# Patient Record
Sex: FEMALE | Race: WHITE | Employment: OTHER | URBAN - METROPOLITAN AREA
[De-identification: names, ages, dates, MRNs, and addresses within clinical notes are randomized per-mention and may not be internally consistent; named-entity substitution may affect disease eponyms.]

---

## 2021-04-28 ENCOUNTER — HOSPITAL ENCOUNTER (OUTPATIENT)
Dept: GENERAL RADIOLOGY | Age: 76
Discharge: HOME | End: 2021-04-28
Attending: SURGERY
Payer: MEDICARE

## 2021-04-28 ENCOUNTER — HOSPITAL ENCOUNTER (OUTPATIENT)
Dept: SURGERY | Age: 76
Discharge: HOME | End: 2021-04-28
Payer: MEDICARE

## 2021-04-28 VITALS
SYSTOLIC BLOOD PRESSURE: 155 MMHG | TEMPERATURE: 97.3 F | BODY MASS INDEX: 27.14 KG/M2 | HEART RATE: 63 BPM | HEIGHT: 62 IN | DIASTOLIC BLOOD PRESSURE: 82 MMHG | OXYGEN SATURATION: 97 % | RESPIRATION RATE: 16 BRPM | WEIGHT: 147.5 LBS

## 2021-04-28 LAB
ALBUMIN SERPL-MCNC: 3.7 G/DL (ref 3.2–4.6)
ALBUMIN/GLOB SERPL: 0.9 {RATIO} (ref 1.2–3.5)
ALP SERPL-CCNC: 88 U/L (ref 50–136)
ALT SERPL-CCNC: 33 U/L (ref 12–65)
ANION GAP SERPL CALC-SCNC: 5 MMOL/L (ref 7–16)
APPEARANCE UR: ABNORMAL
AST SERPL-CCNC: 34 U/L (ref 15–37)
ATRIAL RATE: 59 BPM
BACTERIA URNS QL MICRO: 0 /HPF
BASOPHILS # BLD: 0.1 K/UL (ref 0–0.2)
BASOPHILS NFR BLD: 1 % (ref 0–2)
BILIRUB SERPL-MCNC: 0.9 MG/DL (ref 0.2–1.1)
BILIRUB UR QL: NEGATIVE
BUN SERPL-MCNC: 18 MG/DL (ref 8–23)
CALCIUM SERPL-MCNC: 9.5 MG/DL (ref 8.3–10.4)
CALCULATED P AXIS, ECG09: -3 DEGREES
CALCULATED R AXIS, ECG10: 47 DEGREES
CALCULATED T AXIS, ECG11: 69 DEGREES
CASTS URNS QL MICRO: 0 /LPF
CHLORIDE SERPL-SCNC: 105 MMOL/L (ref 98–107)
CO2 SERPL-SCNC: 30 MMOL/L (ref 21–32)
COLOR UR: YELLOW
CREAT SERPL-MCNC: 1.1 MG/DL (ref 0.6–1)
DIAGNOSIS, 93000: NORMAL
DIFFERENTIAL METHOD BLD: NORMAL
EOSINOPHIL # BLD: 0.2 K/UL (ref 0–0.8)
EOSINOPHIL NFR BLD: 2 % (ref 0.5–7.8)
EPI CELLS #/AREA URNS HPF: ABNORMAL /HPF
ERYTHROCYTE [DISTWIDTH] IN BLOOD BY AUTOMATED COUNT: 12.9 % (ref 11.9–14.6)
GLOBULIN SER CALC-MCNC: 4.1 G/DL (ref 2.3–3.5)
GLUCOSE SERPL-MCNC: 177 MG/DL (ref 65–100)
GLUCOSE UR STRIP.AUTO-MCNC: NEGATIVE MG/DL
HCT VFR BLD AUTO: 43.8 % (ref 35.8–46.3)
HGB BLD-MCNC: 14.1 G/DL (ref 11.7–15.4)
HGB UR QL STRIP: NEGATIVE
IMM GRANULOCYTES # BLD AUTO: 0 K/UL (ref 0–0.5)
IMM GRANULOCYTES NFR BLD AUTO: 0 % (ref 0–5)
INR PPP: 1
KETONES UR QL STRIP.AUTO: NEGATIVE MG/DL
LEUKOCYTE ESTERASE UR QL STRIP.AUTO: NEGATIVE
LYMPHOCYTES # BLD: 2.9 K/UL (ref 0.5–4.6)
LYMPHOCYTES NFR BLD: 29 % (ref 13–44)
MCH RBC QN AUTO: 29.3 PG (ref 26.1–32.9)
MCHC RBC AUTO-ENTMCNC: 32.2 G/DL (ref 31.4–35)
MCV RBC AUTO: 90.9 FL (ref 79.6–97.8)
MONOCYTES # BLD: 0.9 K/UL (ref 0.1–1.3)
MONOCYTES NFR BLD: 10 % (ref 4–12)
NEUTS SEG # BLD: 5.6 K/UL (ref 1.7–8.2)
NEUTS SEG NFR BLD: 58 % (ref 43–78)
NITRITE UR QL STRIP.AUTO: NEGATIVE
NRBC # BLD: 0 K/UL (ref 0–0.2)
P-R INTERVAL, ECG05: 178 MS
PH UR STRIP: 6 [PH] (ref 5–9)
PLATELET # BLD AUTO: 230 K/UL (ref 150–450)
PMV BLD AUTO: 10.5 FL (ref 9.4–12.3)
POTASSIUM SERPL-SCNC: 4.6 MMOL/L (ref 3.5–5.1)
PROT SERPL-MCNC: 7.8 G/DL (ref 6.3–8.2)
PROT UR STRIP-MCNC: NEGATIVE MG/DL
PROTHROMBIN TIME: 13.9 SEC (ref 12.5–14.7)
Q-T INTERVAL, ECG07: 456 MS
QRS DURATION, ECG06: 94 MS
QTC CALCULATION (BEZET), ECG08: 451 MS
RBC # BLD AUTO: 4.82 M/UL (ref 4.05–5.2)
RBC #/AREA URNS HPF: ABNORMAL /HPF
SODIUM SERPL-SCNC: 140 MMOL/L (ref 136–145)
SP GR UR REFRACTOMETRY: 1.03 (ref 1–1.02)
UROBILINOGEN UR QL STRIP.AUTO: 1 EU/DL (ref 0.2–1)
VENTRICULAR RATE, ECG03: 59 BPM
WBC # BLD AUTO: 9.7 K/UL (ref 4.3–11.1)
WBC URNS QL MICRO: ABNORMAL /HPF

## 2021-04-28 PROCEDURE — 81003 URINALYSIS AUTO W/O SCOPE: CPT

## 2021-04-28 PROCEDURE — 85610 PROTHROMBIN TIME: CPT

## 2021-04-28 PROCEDURE — 85025 COMPLETE CBC W/AUTO DIFF WBC: CPT

## 2021-04-28 PROCEDURE — 93005 ELECTROCARDIOGRAM TRACING: CPT | Performed by: SURGERY

## 2021-04-28 PROCEDURE — 71046 X-RAY EXAM CHEST 2 VIEWS: CPT

## 2021-04-28 PROCEDURE — 80053 COMPREHEN METABOLIC PANEL: CPT

## 2021-04-28 RX ORDER — SERTRALINE HYDROCHLORIDE 50 MG/1
50 TABLET, FILM COATED ORAL DAILY
COMMUNITY

## 2021-04-28 NOTE — PERIOP NOTES
Patient verified name and     Order for consent found in EHR and matches case posting; patient verified. Type 3 surgery, walk-in assessment complete. Labs per surgeon: CBC, CMP, UA, PT/INR, T/S DOS, CXR; results pending  Labs per anesthesia protocol: No additional;   EKG: EKG-2021    Reviewed pt history and medications with Dr. Kiana Sofia. Per Dr. Kiana Sofia, pt is to remain taking ASA 81 mg. Patient COVID test date 2021; Patient did show for the appointment. The testing center is located at the [address], [place]. If appointment is needed patient provided telephone number of [phone]. Hospital approved surgical skin cleanser and instructions given per hospital policy. Patient provided with and instructed on educational handouts including Guide to Surgery, Pain Management, Hand Hygiene, Blood Transfusion Education, and [place] [facility] Brochure. Patient answered medical/surgical history questions at their best of ability. All prior to admission medications documented in [facility]. Original medication prescription bottle were not visualized during patient appointment. Patient instructed to hold all vitamins 7 days prior to surgery and NSAIDS 5 days prior to surgery, patient verbalized understanding. Patient teach back successful and patient demonstrates knowledge of instructions. PLEASE CONTINUE TAKING ALL PRESCRIPTION MEDICATIONS UP TO THE DAY OF SURGERY UNLESS OTHERWISE DIRECTED BELOW. DISCONTINUE all vitamins and supplements 7 days prior to surgery. DISCONTINUE Non-Steriodal Anti-Inflammatory (NSAIDS) such as Advil, Excedrin, Ibuprofen, Motrin and Aleve 5 days prior to surgery.      Home Medications to take  the day of surgery   Flecainide  Amlodipine    Pantoprazole   Aspirin 81 mg   Sertraline   Atenolol      Home Medications   to Hold           Comments            *Visitor policy of 1 visitor per patient discussed. Please do not bring home medications with you on the day of surgery unless otherwise directed by your nurse. If you are instructed to bring home medications, please give them to your nurse as they will be administered by the nursing staff. If you have any questions, please call [facility]ortensia Mckeon [phone] or [facility] [phone]. A copy of this note was provided to the patient for reference.

## 2021-04-28 NOTE — PERIOP NOTES
Recent Results (from the past 12 hour(s))   URINALYSIS W/ RFLX MICROSCOPIC    Collection Time: 04/28/21  2:10 PM   Result Value Ref Range    Color YELLOW      Appearance TURBID      Specific gravity 1.029 (H) 1.001 - 1.023      pH (UA) 6.0 5.0 - 9.0      Protein Negative NEG mg/dL    Glucose Negative mg/dL    Ketone Negative NEG mg/dL    Bilirubin Negative NEG      Blood Negative NEG      Urobilinogen 1.0 0.2 - 1.0 EU/dL    Nitrites Negative NEG      Leukocyte Esterase Negative NEG      WBC 0-3 0 /hpf    RBC 3-5 0 /hpf    Epithelial cells 0-3 0 /hpf    Bacteria 0 0 /hpf    Casts 0 0 /lpf   CBC WITH AUTOMATED DIFF    Collection Time: 04/28/21  2:41 PM   Result Value Ref Range    WBC 9.7 4.3 - 11.1 K/uL    RBC 4.82 4.05 - 5.2 M/uL    HGB 14.1 11.7 - 15.4 g/dL    HCT 43.8 35.8 - 46.3 %    MCV 90.9 79.6 - 97.8 FL    MCH 29.3 26.1 - 32.9 PG    MCHC 32.2 31.4 - 35.0 g/dL    RDW 12.9 11.9 - 14.6 %    PLATELET 849 346 - 712 K/uL    MPV 10.5 9.4 - 12.3 FL    ABSOLUTE NRBC 0.00 0.0 - 0.2 K/uL    DF AUTOMATED      NEUTROPHILS 58 43 - 78 %    LYMPHOCYTES 29 13 - 44 %    MONOCYTES 10 4.0 - 12.0 %    EOSINOPHILS 2 0.5 - 7.8 %    BASOPHILS 1 0.0 - 2.0 %    IMMATURE GRANULOCYTES 0 0.0 - 5.0 %    ABS. NEUTROPHILS 5.6 1.7 - 8.2 K/UL    ABS. LYMPHOCYTES 2.9 0.5 - 4.6 K/UL    ABS. MONOCYTES 0.9 0.1 - 1.3 K/UL    ABS. EOSINOPHILS 0.2 0.0 - 0.8 K/UL    ABS. BASOPHILS 0.1 0.0 - 0.2 K/UL    ABS. IMM.  GRANS. 0.0 0.0 - 0.5 K/UL   METABOLIC PANEL, COMPREHENSIVE    Collection Time: 04/28/21  2:41 PM   Result Value Ref Range    Sodium 140 136 - 145 mmol/L    Potassium 4.6 3.5 - 5.1 mmol/L    Chloride 105 98 - 107 mmol/L    CO2 30 21 - 32 mmol/L    Anion gap 5 (L) 7 - 16 mmol/L    Glucose 177 (H) 65 - 100 mg/dL    BUN 18 8 - 23 MG/DL    Creatinine 1.10 (H) 0.6 - 1.0 MG/DL    GFR est AA >60 >60 ml/min/1.73m2    GFR est non-AA 52 (L) >60 ml/min/1.73m2    Calcium 9.5 8.3 - 10.4 MG/DL    Bilirubin, total 0.9 0.2 - 1.1 MG/DL    ALT (SGPT) 33 12 - 65 U/L    AST (SGOT) 34 15 - 37 U/L    Alk. phosphatase 88 50 - 136 U/L    Protein, total 7.8 6.3 - 8.2 g/dL    Albumin 3.7 3.2 - 4.6 g/dL    Globulin 4.1 (H) 2.3 - 3.5 g/dL    A-G Ratio 0.9 (L) 1.2 - 3.5     PROTHROMBIN TIME + INR    Collection Time: 04/28/21  2:41 PM   Result Value Ref Range    Prothrombin time 13.9 12.5 - 14.7 sec    INR 1.0     EKG, 12 LEAD, INITIAL    Collection Time: 04/28/21  2:53 PM   Result Value Ref Range    Ventricular Rate 59 BPM    Atrial Rate 59 BPM    P-R Interval 178 ms    QRS Duration 94 ms    Q-T Interval 456 ms    QTC Calculation (Bezet) 451 ms    Calculated P Axis -3 degrees    Calculated R Axis 47 degrees    Calculated T Axis 69 degrees    Diagnosis       Sinus bradycardia  Otherwise normal ECG  No previous ECGs available       EXAM: XR CHEST PA LAT     INDICATION: Preop clearance     COMPARISON: None.     FINDINGS: PA and lateral radiographs of the chest demonstrate clear lungs.  The  cardiac and mediastinal contours and pulmonary vascularity are normal. The bones  and soft tissues are within normal limits.      IMPRESSION  Normal chest.

## 2021-05-04 ENCOUNTER — ANESTHESIA EVENT (OUTPATIENT)
Dept: SURGERY | Age: 76
DRG: 407 | End: 2021-05-04
Payer: MEDICARE

## 2021-05-05 ENCOUNTER — ANESTHESIA (OUTPATIENT)
Dept: SURGERY | Age: 76
DRG: 407 | End: 2021-05-05
Payer: MEDICARE

## 2021-05-05 ENCOUNTER — HOSPITAL ENCOUNTER (INPATIENT)
Age: 76
LOS: 5 days | Discharge: HOME | DRG: 407 | End: 2021-05-10
Attending: SURGERY | Admitting: SURGERY
Payer: MEDICARE

## 2021-05-05 DIAGNOSIS — C25.1 MALIGNANT NEOPLASM OF BODY OF PANCREAS (HCC): Primary | ICD-10-CM

## 2021-05-05 DIAGNOSIS — D49.0 IPMN (INTRADUCTAL PAPILLARY MUCINOUS NEOPLASM): ICD-10-CM

## 2021-05-05 LAB
ABO + RH BLD: NORMAL
BLOOD GROUP ANTIBODIES SERPL: NORMAL
GLUCOSE BLD STRIP.AUTO-MCNC: 170 MG/DL (ref 65–100)
GLUCOSE BLD STRIP.AUTO-MCNC: 255 MG/DL (ref 65–100)
SERVICE CMNT-IMP: ABNORMAL
SERVICE CMNT-IMP: ABNORMAL
SPECIMEN EXP DATE BLD: NORMAL

## 2021-05-05 PROCEDURE — 77030038079 HC RELD STPLR ENDOSC J&J -G: Performed by: SURGERY

## 2021-05-05 PROCEDURE — 49905 OMENTAL FLAP INTRA-ABDOM: CPT | Performed by: SURGERY

## 2021-05-05 PROCEDURE — 2709999900 HC NON-CHARGEABLE SUPPLY: Performed by: SURGERY

## 2021-05-05 PROCEDURE — 65270000029 HC RM PRIVATE

## 2021-05-05 PROCEDURE — 0FT40ZZ RESECTION OF GALLBLADDER, OPEN APPROACH: ICD-10-PCS | Performed by: SURGERY

## 2021-05-05 PROCEDURE — 77030016151 HC PROTCTR LNS DFOG COVD -B: Performed by: SURGERY

## 2021-05-05 PROCEDURE — 77030011264 HC ELECTRD BLD EXT COVD -A: Performed by: SURGERY

## 2021-05-05 PROCEDURE — 07TP0ZZ RESECTION OF SPLEEN, OPEN APPROACH: ICD-10-PCS | Performed by: SURGERY

## 2021-05-05 PROCEDURE — 76210000006 HC OR PH I REC 0.5 TO 1 HR: Performed by: SURGERY

## 2021-05-05 PROCEDURE — 76010000176 HC OR TIME 4.5 TO 5 HR INTENSV-TIER 1: Performed by: SURGERY

## 2021-05-05 PROCEDURE — 0FBG0ZZ EXCISION OF PANCREAS, OPEN APPROACH: ICD-10-PCS | Performed by: SURGERY

## 2021-05-05 PROCEDURE — 77030020829: Performed by: SURGERY

## 2021-05-05 PROCEDURE — 77030008606 HC TRCR ENDOSC KII AMR -B: Performed by: SURGERY

## 2021-05-05 PROCEDURE — 2709999900 HC NON-CHARGEABLE SUPPLY

## 2021-05-05 PROCEDURE — 77030002996 HC SUT SLK J&J -A: Performed by: SURGERY

## 2021-05-05 PROCEDURE — 77030009527 HC GEL PRT SYS AMR -E: Performed by: SURGERY

## 2021-05-05 PROCEDURE — 77030034849: Performed by: SURGERY

## 2021-05-05 PROCEDURE — 77030008462 HC STPLR SKN PROX J&J -A: Performed by: SURGERY

## 2021-05-05 PROCEDURE — 74011250636 HC RX REV CODE- 250/636: Performed by: SURGERY

## 2021-05-05 PROCEDURE — 86901 BLOOD TYPING SEROLOGIC RH(D): CPT

## 2021-05-05 PROCEDURE — 76060000040 HC ANESTHESIA 4.5 TO 5 HR: Performed by: SURGERY

## 2021-05-05 PROCEDURE — 77030027876 HC STPLR ENDOSC FLX PWR J&J -G1: Performed by: SURGERY

## 2021-05-05 PROCEDURE — 74011250636 HC RX REV CODE- 250/636: Performed by: STUDENT IN AN ORGANIZED HEALTH CARE EDUCATION/TRAINING PROGRAM

## 2021-05-05 PROCEDURE — 77030002966 HC SUT PDS J&J -A: Performed by: SURGERY

## 2021-05-05 PROCEDURE — 77030041524 HC SEALNT FIBRN VITASEAL J&J -C: Performed by: SURGERY

## 2021-05-05 PROCEDURE — 77030008756 HC TU IRR SUC STRY -B: Performed by: SURGERY

## 2021-05-05 PROCEDURE — 74011250636 HC RX REV CODE- 250/636: Performed by: NURSE ANESTHETIST, CERTIFIED REGISTERED

## 2021-05-05 PROCEDURE — 48140 PARTIAL REMOVAL OF PANCREAS: CPT | Performed by: NURSE PRACTITIONER

## 2021-05-05 PROCEDURE — 77030039425 HC BLD LARYNG TRULITE DISP TELE -A: Performed by: NURSE ANESTHETIST, CERTIFIED REGISTERED

## 2021-05-05 PROCEDURE — 74011250636 HC RX REV CODE- 250/636: Performed by: NURSE PRACTITIONER

## 2021-05-05 PROCEDURE — 76998 US GUIDE INTRAOP: CPT | Performed by: SURGERY

## 2021-05-05 PROCEDURE — 77030038552 HC DRN WND MDII -A: Performed by: SURGERY

## 2021-05-05 PROCEDURE — 49905 OMENTAL FLAP INTRA-ABDOM: CPT | Performed by: NURSE PRACTITIONER

## 2021-05-05 PROCEDURE — 77030009968 HC RELD STPLR ENDOSC J&J -D: Performed by: SURGERY

## 2021-05-05 PROCEDURE — 77030012770 HC TRCR OPT FX AMR -B: Performed by: SURGERY

## 2021-05-05 PROCEDURE — 74011636637 HC RX REV CODE- 636/637: Performed by: NURSE PRACTITIONER

## 2021-05-05 PROCEDURE — 77030002986 HC SUT PROL J&J -A: Performed by: SURGERY

## 2021-05-05 PROCEDURE — 48140 PARTIAL REMOVAL OF PANCREAS: CPT | Performed by: SURGERY

## 2021-05-05 PROCEDURE — 88304 TISSUE EXAM BY PATHOLOGIST: CPT

## 2021-05-05 PROCEDURE — 82962 GLUCOSE BLOOD TEST: CPT

## 2021-05-05 PROCEDURE — 77030031139 HC SUT VCRL2 J&J -A: Performed by: SURGERY

## 2021-05-05 PROCEDURE — 74011000250 HC RX REV CODE- 250: Performed by: NURSE ANESTHETIST, CERTIFIED REGISTERED

## 2021-05-05 PROCEDURE — 88305 TISSUE EXAM BY PATHOLOGIST: CPT

## 2021-05-05 PROCEDURE — 74011000250 HC RX REV CODE- 250: Performed by: NURSE PRACTITIONER

## 2021-05-05 PROCEDURE — 77030037088 HC TUBE ENDOTRACH ORAL NSL COVD-A: Performed by: NURSE ANESTHETIST, CERTIFIED REGISTERED

## 2021-05-05 PROCEDURE — 77030034628 HC LIGASURE LAP SEAL DIV MD COVD -F: Performed by: SURGERY

## 2021-05-05 PROCEDURE — 77030003602 HC NDL NRV BLK BBMI -B: Performed by: STUDENT IN AN ORGANIZED HEALTH CARE EDUCATION/TRAINING PROGRAM

## 2021-05-05 PROCEDURE — 77030040922 HC BLNKT HYPOTHRM STRY -A: Performed by: NURSE ANESTHETIST, CERTIFIED REGISTERED

## 2021-05-05 PROCEDURE — 74011000258 HC RX REV CODE- 258: Performed by: NURSE PRACTITIONER

## 2021-05-05 PROCEDURE — 77030019908 HC STETH ESOPH SIMS -A: Performed by: NURSE ANESTHETIST, CERTIFIED REGISTERED

## 2021-05-05 PROCEDURE — 88309 TISSUE EXAM BY PATHOLOGIST: CPT

## 2021-05-05 PROCEDURE — 47600 CHOLECYSTECTOMY: CPT | Performed by: NURSE PRACTITIONER

## 2021-05-05 PROCEDURE — 77030040361 HC SLV COMPR DVT MDII -B: Performed by: SURGERY

## 2021-05-05 PROCEDURE — 0WJP4ZZ INSPECTION OF GASTROINTESTINAL TRACT, PERCUTANEOUS ENDOSCOPIC APPROACH: ICD-10-PCS | Performed by: SURGERY

## 2021-05-05 PROCEDURE — 47600 CHOLECYSTECTOMY: CPT | Performed by: SURGERY

## 2021-05-05 RX ORDER — SODIUM CHLORIDE, SODIUM LACTATE, POTASSIUM CHLORIDE, CALCIUM CHLORIDE 600; 310; 30; 20 MG/100ML; MG/100ML; MG/100ML; MG/100ML
25 INJECTION, SOLUTION INTRAVENOUS CONTINUOUS
Status: DISCONTINUED | OUTPATIENT
Start: 2021-05-05 | End: 2021-05-09

## 2021-05-05 RX ORDER — METOPROLOL TARTRATE 5 MG/5ML
2.5 INJECTION INTRAVENOUS EVERY 6 HOURS
Status: DISCONTINUED | OUTPATIENT
Start: 2021-05-05 | End: 2021-05-06

## 2021-05-05 RX ORDER — CEFAZOLIN SODIUM/WATER 2 G/20 ML
2 SYRINGE (ML) INTRAVENOUS ONCE
Status: COMPLETED | OUTPATIENT
Start: 2021-05-05 | End: 2021-05-05

## 2021-05-05 RX ORDER — DIPHENHYDRAMINE HYDROCHLORIDE 50 MG/ML
12.5 INJECTION, SOLUTION INTRAMUSCULAR; INTRAVENOUS
Status: DISCONTINUED | OUTPATIENT
Start: 2021-05-05 | End: 2021-05-05 | Stop reason: HOSPADM

## 2021-05-05 RX ORDER — LIDOCAINE HYDROCHLORIDE 20 MG/ML
INJECTION, SOLUTION EPIDURAL; INFILTRATION; INTRACAUDAL; PERINEURAL AS NEEDED
Status: DISCONTINUED | OUTPATIENT
Start: 2021-05-05 | End: 2021-05-05 | Stop reason: HOSPADM

## 2021-05-05 RX ORDER — VECURONIUM BROMIDE FOR INJECTION 1 MG/ML
INJECTION, POWDER, LYOPHILIZED, FOR SOLUTION INTRAVENOUS AS NEEDED
Status: DISCONTINUED | OUTPATIENT
Start: 2021-05-05 | End: 2021-05-05 | Stop reason: HOSPADM

## 2021-05-05 RX ORDER — FLUMAZENIL 0.1 MG/ML
0.2 INJECTION INTRAVENOUS
Status: DISCONTINUED | OUTPATIENT
Start: 2021-05-05 | End: 2021-05-05 | Stop reason: HOSPADM

## 2021-05-05 RX ORDER — LIDOCAINE HYDROCHLORIDE 10 MG/ML
0.1 INJECTION INFILTRATION; PERINEURAL AS NEEDED
Status: DISCONTINUED | OUTPATIENT
Start: 2021-05-05 | End: 2021-05-05 | Stop reason: HOSPADM

## 2021-05-05 RX ORDER — HYDROMORPHONE HYDROCHLORIDE 2 MG/ML
INJECTION, SOLUTION INTRAMUSCULAR; INTRAVENOUS; SUBCUTANEOUS AS NEEDED
Status: DISCONTINUED | OUTPATIENT
Start: 2021-05-05 | End: 2021-05-05 | Stop reason: HOSPADM

## 2021-05-05 RX ORDER — ENOXAPARIN SODIUM 100 MG/ML
40 INJECTION SUBCUTANEOUS DAILY
Status: DISCONTINUED | OUTPATIENT
Start: 2021-05-06 | End: 2021-05-10 | Stop reason: HOSPADM

## 2021-05-05 RX ORDER — SODIUM CHLORIDE, SODIUM LACTATE, POTASSIUM CHLORIDE, CALCIUM CHLORIDE 600; 310; 30; 20 MG/100ML; MG/100ML; MG/100ML; MG/100ML
100 INJECTION, SOLUTION INTRAVENOUS CONTINUOUS
Status: DISCONTINUED | OUTPATIENT
Start: 2021-05-05 | End: 2021-05-05 | Stop reason: HOSPADM

## 2021-05-05 RX ORDER — ONDANSETRON 2 MG/ML
INJECTION INTRAMUSCULAR; INTRAVENOUS AS NEEDED
Status: DISCONTINUED | OUTPATIENT
Start: 2021-05-05 | End: 2021-05-05 | Stop reason: HOSPADM

## 2021-05-05 RX ORDER — SODIUM CHLORIDE 0.9 % (FLUSH) 0.9 %
5-40 SYRINGE (ML) INJECTION EVERY 8 HOURS
Status: DISCONTINUED | OUTPATIENT
Start: 2021-05-05 | End: 2021-05-05 | Stop reason: HOSPADM

## 2021-05-05 RX ORDER — PROPOFOL 10 MG/ML
INJECTION, EMULSION INTRAVENOUS AS NEEDED
Status: DISCONTINUED | OUTPATIENT
Start: 2021-05-05 | End: 2021-05-05 | Stop reason: HOSPADM

## 2021-05-05 RX ORDER — ROPIVACAINE HYDROCHLORIDE 5 MG/ML
INJECTION, SOLUTION EPIDURAL; INFILTRATION; PERINEURAL
Status: DISCONTINUED | OUTPATIENT
Start: 2021-05-05 | End: 2021-05-05 | Stop reason: HOSPADM

## 2021-05-05 RX ORDER — KETAMINE HYDROCHLORIDE 50 MG/ML
INJECTION, SOLUTION INTRAMUSCULAR; INTRAVENOUS AS NEEDED
Status: DISCONTINUED | OUTPATIENT
Start: 2021-05-05 | End: 2021-05-05 | Stop reason: HOSPADM

## 2021-05-05 RX ORDER — SODIUM CHLORIDE 0.9 % (FLUSH) 0.9 %
5-40 SYRINGE (ML) INJECTION AS NEEDED
Status: DISCONTINUED | OUTPATIENT
Start: 2021-05-05 | End: 2021-05-05 | Stop reason: HOSPADM

## 2021-05-05 RX ORDER — NEOSTIGMINE METHYLSULFATE 1 MG/ML
INJECTION, SOLUTION INTRAVENOUS AS NEEDED
Status: DISCONTINUED | OUTPATIENT
Start: 2021-05-05 | End: 2021-05-05 | Stop reason: HOSPADM

## 2021-05-05 RX ORDER — METRONIDAZOLE 500 MG/100ML
500 INJECTION, SOLUTION INTRAVENOUS ONCE
Status: COMPLETED | OUTPATIENT
Start: 2021-05-05 | End: 2021-05-05

## 2021-05-05 RX ORDER — SODIUM CHLORIDE 0.9 % (FLUSH) 0.9 %
5-40 SYRINGE (ML) INJECTION AS NEEDED
Status: DISCONTINUED | OUTPATIENT
Start: 2021-05-05 | End: 2021-05-10 | Stop reason: HOSPADM

## 2021-05-05 RX ORDER — OXYCODONE HYDROCHLORIDE 5 MG/1
5 TABLET ORAL
Status: DISCONTINUED | OUTPATIENT
Start: 2021-05-05 | End: 2021-05-05 | Stop reason: HOSPADM

## 2021-05-05 RX ORDER — HYDROMORPHONE HYDROCHLORIDE 1 MG/ML
0.5 INJECTION, SOLUTION INTRAMUSCULAR; INTRAVENOUS; SUBCUTANEOUS
Status: DISCONTINUED | OUTPATIENT
Start: 2021-05-05 | End: 2021-05-05 | Stop reason: HOSPADM

## 2021-05-05 RX ORDER — METRONIDAZOLE 500 MG/100ML
500 INJECTION, SOLUTION INTRAVENOUS EVERY 12 HOURS
Status: COMPLETED | OUTPATIENT
Start: 2021-05-05 | End: 2021-05-06

## 2021-05-05 RX ORDER — EPHEDRINE SULFATE/0.9% NACL/PF 50 MG/5 ML
SYRINGE (ML) INTRAVENOUS AS NEEDED
Status: DISCONTINUED | OUTPATIENT
Start: 2021-05-05 | End: 2021-05-05 | Stop reason: HOSPADM

## 2021-05-05 RX ORDER — ROCURONIUM BROMIDE 10 MG/ML
INJECTION, SOLUTION INTRAVENOUS AS NEEDED
Status: DISCONTINUED | OUTPATIENT
Start: 2021-05-05 | End: 2021-05-05 | Stop reason: HOSPADM

## 2021-05-05 RX ORDER — DEXAMETHASONE SODIUM PHOSPHATE 4 MG/ML
INJECTION, SOLUTION INTRA-ARTICULAR; INTRALESIONAL; INTRAMUSCULAR; INTRAVENOUS; SOFT TISSUE AS NEEDED
Status: DISCONTINUED | OUTPATIENT
Start: 2021-05-05 | End: 2021-05-05 | Stop reason: HOSPADM

## 2021-05-05 RX ORDER — HYDROMORPHONE HYDROCHLORIDE 1 MG/ML
1 INJECTION, SOLUTION INTRAMUSCULAR; INTRAVENOUS; SUBCUTANEOUS
Status: DISCONTINUED | OUTPATIENT
Start: 2021-05-05 | End: 2021-05-06 | Stop reason: SDUPTHER

## 2021-05-05 RX ORDER — GLYCOPYRROLATE 0.2 MG/ML
INJECTION INTRAMUSCULAR; INTRAVENOUS AS NEEDED
Status: DISCONTINUED | OUTPATIENT
Start: 2021-05-05 | End: 2021-05-05 | Stop reason: HOSPADM

## 2021-05-05 RX ORDER — FENTANYL CITRATE 50 UG/ML
INJECTION, SOLUTION INTRAMUSCULAR; INTRAVENOUS AS NEEDED
Status: DISCONTINUED | OUTPATIENT
Start: 2021-05-05 | End: 2021-05-05 | Stop reason: HOSPADM

## 2021-05-05 RX ORDER — NALOXONE HYDROCHLORIDE 0.4 MG/ML
0.1 INJECTION, SOLUTION INTRAMUSCULAR; INTRAVENOUS; SUBCUTANEOUS AS NEEDED
Status: DISCONTINUED | OUTPATIENT
Start: 2021-05-05 | End: 2021-05-05 | Stop reason: HOSPADM

## 2021-05-05 RX ORDER — SODIUM CHLORIDE 0.9 % (FLUSH) 0.9 %
5-40 SYRINGE (ML) INJECTION EVERY 8 HOURS
Status: DISCONTINUED | OUTPATIENT
Start: 2021-05-05 | End: 2021-05-10 | Stop reason: HOSPADM

## 2021-05-05 RX ADMIN — METRONIDAZOLE 500 MG: 500 INJECTION, SOLUTION INTRAVENOUS at 20:44

## 2021-05-05 RX ADMIN — HYDROMORPHONE HYDROCHLORIDE 0.5 MG: 1 INJECTION, SOLUTION INTRAMUSCULAR; INTRAVENOUS; SUBCUTANEOUS at 14:27

## 2021-05-05 RX ADMIN — ROCURONIUM BROMIDE 40 MG: 10 INJECTION, SOLUTION INTRAVENOUS at 07:26

## 2021-05-05 RX ADMIN — SODIUM CHLORIDE, SODIUM LACTATE, POTASSIUM CHLORIDE, AND CALCIUM CHLORIDE 100 ML/HR: 600; 310; 30; 20 INJECTION, SOLUTION INTRAVENOUS at 18:21

## 2021-05-05 RX ADMIN — ROPIVACAINE HYDROCHLORIDE 15 ML: 150 INJECTION, SOLUTION EPIDURAL; INFILTRATION; PERINEURAL at 11:26

## 2021-05-05 RX ADMIN — METOPROLOL TARTRATE 2.5 MG: 5 INJECTION INTRAVENOUS at 23:59

## 2021-05-05 RX ADMIN — Medication 10 MG: at 08:29

## 2021-05-05 RX ADMIN — PHENYLEPHRINE HYDROCHLORIDE 120 MCG: 10 INJECTION INTRAVENOUS at 07:48

## 2021-05-05 RX ADMIN — VECURONIUM BROMIDE 1 MG: 1 INJECTION, POWDER, LYOPHILIZED, FOR SOLUTION INTRAVENOUS at 10:22

## 2021-05-05 RX ADMIN — Medication 10 ML: at 18:22

## 2021-05-05 RX ADMIN — VECURONIUM BROMIDE 1 MG: 1 INJECTION, POWDER, LYOPHILIZED, FOR SOLUTION INTRAVENOUS at 09:49

## 2021-05-05 RX ADMIN — HYDROMORPHONE HYDROCHLORIDE 0.2 MG: 2 INJECTION INTRAMUSCULAR; INTRAVENOUS; SUBCUTANEOUS at 09:25

## 2021-05-05 RX ADMIN — Medication 10 MG: at 07:39

## 2021-05-05 RX ADMIN — ONDANSETRON 4 MG: 2 INJECTION INTRAMUSCULAR; INTRAVENOUS at 07:31

## 2021-05-05 RX ADMIN — VECURONIUM BROMIDE 1 MG: 1 INJECTION, POWDER, LYOPHILIZED, FOR SOLUTION INTRAVENOUS at 09:23

## 2021-05-05 RX ADMIN — Medication 3 MG: at 11:14

## 2021-05-05 RX ADMIN — Medication 10 MG: at 08:10

## 2021-05-05 RX ADMIN — VECURONIUM BROMIDE 1 MG: 1 INJECTION, POWDER, LYOPHILIZED, FOR SOLUTION INTRAVENOUS at 08:55

## 2021-05-05 RX ADMIN — HYDROMORPHONE HYDROCHLORIDE 0.2 MG: 2 INJECTION INTRAMUSCULAR; INTRAVENOUS; SUBCUTANEOUS at 09:40

## 2021-05-05 RX ADMIN — METOPROLOL TARTRATE 2.5 MG: 5 INJECTION INTRAVENOUS at 18:21

## 2021-05-05 RX ADMIN — METRONIDAZOLE 500 MG: 500 INJECTION, SOLUTION INTRAVENOUS at 05:52

## 2021-05-05 RX ADMIN — CEFAZOLIN SODIUM 1 G: 1 INJECTION, POWDER, FOR SOLUTION INTRAMUSCULAR; INTRAVENOUS at 18:21

## 2021-05-05 RX ADMIN — Medication 10 MG: at 07:37

## 2021-05-05 RX ADMIN — SODIUM CHLORIDE, SODIUM LACTATE, POTASSIUM CHLORIDE, AND CALCIUM CHLORIDE: 600; 310; 30; 20 INJECTION, SOLUTION INTRAVENOUS at 09:32

## 2021-05-05 RX ADMIN — HYDROMORPHONE HYDROCHLORIDE 1 MG: 1 INJECTION, SOLUTION INTRAMUSCULAR; INTRAVENOUS; SUBCUTANEOUS at 18:57

## 2021-05-05 RX ADMIN — HYDROMORPHONE HYDROCHLORIDE 0.2 MG: 2 INJECTION INTRAMUSCULAR; INTRAVENOUS; SUBCUTANEOUS at 10:32

## 2021-05-05 RX ADMIN — DEXAMETHASONE SODIUM PHOSPHATE 4 MG: 4 INJECTION, SOLUTION INTRAMUSCULAR; INTRAVENOUS at 07:31

## 2021-05-05 RX ADMIN — KETAMINE HYDROCHLORIDE 30 MG: 50 INJECTION INTRAMUSCULAR; INTRAVENOUS at 07:45

## 2021-05-05 RX ADMIN — PROPOFOL 150 MG: 10 INJECTION, EMULSION INTRAVENOUS at 07:25

## 2021-05-05 RX ADMIN — INSULIN HUMAN 9 UNITS: 100 INJECTION, SOLUTION PARENTERAL at 18:54

## 2021-05-05 RX ADMIN — CEFAZOLIN 2 G: 1 INJECTION, POWDER, FOR SOLUTION INTRAVENOUS at 07:45

## 2021-05-05 RX ADMIN — SODIUM CHLORIDE, SODIUM LACTATE, POTASSIUM CHLORIDE, AND CALCIUM CHLORIDE 100 ML/HR: 600; 310; 30; 20 INJECTION, SOLUTION INTRAVENOUS at 05:51

## 2021-05-05 RX ADMIN — GLYCOPYRROLATE 0.4 MG: 0.2 INJECTION, SOLUTION INTRAMUSCULAR; INTRAVENOUS at 11:14

## 2021-05-05 RX ADMIN — VECURONIUM BROMIDE 1 MG: 1 INJECTION, POWDER, LYOPHILIZED, FOR SOLUTION INTRAVENOUS at 08:18

## 2021-05-05 RX ADMIN — ROPIVACAINE HYDROCHLORIDE 15 ML: 150 INJECTION, SOLUTION EPIDURAL; INFILTRATION; PERINEURAL at 11:23

## 2021-05-05 RX ADMIN — LIDOCAINE HYDROCHLORIDE 80 MG: 20 INJECTION, SOLUTION EPIDURAL; INFILTRATION; INTRACAUDAL; PERINEURAL at 07:25

## 2021-05-05 RX ADMIN — FENTANYL CITRATE 100 MCG: 50 INJECTION INTRAMUSCULAR; INTRAVENOUS at 07:25

## 2021-05-05 RX ADMIN — KETAMINE HYDROCHLORIDE 15 MG: 50 INJECTION INTRAMUSCULAR; INTRAVENOUS at 08:45

## 2021-05-05 NOTE — ANESTHESIA PROCEDURE NOTES
Peripheral Block    Start time: 5/5/2021 11:20 AM  End time: 5/5/2021 11:23 AM  Performed by: Hilaria Goldmann, MD  Authorized by: Hilaria Goldmann, MD       Pre-procedure:    Indications: at surgeon's request and post-op pain management    Preanesthetic Checklist: patient identified, risks and benefits discussed, site marked, timeout performed, anesthesia consent given and patient being monitored    Timeout Time: 11:20          Block Type:   Block Type:  Rectus sheath  Laterality:  Left  Monitoring:  Standard ASA monitoring, oxygen, continuous pulse ox, frequent vital sign checks and heart rate  Injection Technique:  Single shot  Procedures: ultrasound guided    Patient Position: supine  Prep: chlorhexidine    Location:  Abdominal  Needle Type:  Stimuplex  Needle Gauge:  20 G  Needle Localization:  Ultrasound guidance  Medication Injected:  Ropivacaine (PF) (NAROPIN)(0.5%) 5 mg/mL injection, 15 mL  Med Admin Time: 5/5/2021 11:23 AM    Assessment:  Number of attempts:  1  Injection Assessment:  Incremental injection every 5 mL, no paresthesia, ultrasound image on chart, local visualized surrounding nerve on ultrasound, negative aspiration for blood and no intravascular symptoms  Patient tolerance:  Patient tolerated the procedure well with no immediate complications  +5 cc NS

## 2021-05-05 NOTE — OP NOTES
[facility]  OPERATIVE REPORT    Name:  Wil Carter  MR#:  [id number]  :  1945  ACCOUNT #:  [de-identified]  DATE OF SERVICE:  2021    PREOPERATIVE DIAGNOSIS:  Pancreatic intraductal papillary mucinous neoplasm. POSTOPERATIVE DIAGNOSIS:  Pancreatic intraductal papillary mucinous neoplasm. PROCEDURES PERFORMED:  1. Subtotal distal pancreatectomy with splenectomy, laparoscopic converted to open. 2.  Extensive vascular dissection. 3.  Cholecystectomy. 4.  Falciform ligament flap. 5.  Intraoperative ultrasound. SURGEON:  Areatha Lundborg, MD    ASSISTANT:  Ewelina Brizuela ND    ANESTHESIA:  general    COMPLICATIONS: none    SPECIMENS REMOVED:  As above. IMPLANTS:  Shaq x 1    ESTIMATED BLOOD LOSS:  100 ml    INDICATION:  This is a 59-year-old female, who presented with pancreatic cystic lesions involving her pancreatic body and the tail and is being watched by GI, it is enlarging and is concerning for main duct IPMN, which has high risk for cancer. I offered her surgical resection to remove the distal part of her pancreas and spared the head of the pancreas. She understands the surgery is mainly to reduce her cancer risk in the future. She is already diabetic and then she may need pancreatic enzyme supplements. This was all discussed with her. She understood the risks and benefits and agreed to proceed. FINDINGS:  1. Her pancreas is indurated with multiple nodular changes and the surrounding adhesions involving from the neck to the tail of pancreas. 2.  The nodular and the inflammatory changes extend onto the portal vein/superior mesenteric vein confluence with adhesions to celiac trunk and the common hepatic artery. Extensive vascular dissection is performed, which cannot be safely done laparoscopically, so the case was switched to open. PROCEDURE:  After informed consent was obtained, the patient was brought into the operating room and left on supine position. General anesthesia was administered. The patient's abdomen was prepped and draped in the routine fashion and we first placed a minilaparotomy incision at the epigastrium. The GelPort was placed and then pneumoperitoneum created. I placed a 12-mm trocar at the left subcostal area and two 5-mm trocars, one at the left subcostal area laterally, the other one at the supraumbilical area and I first started mobilizing the gastrocolic ligaments. The lesser sac was entered and the pancreas was immediately felt to be indurated and extend all the way to the central pancreas, but I decided to continue mobilizing the stomach and the greater curvature was mobilized and the short gastric artery was taken. Then, the stomach fundus was completely mobilized off the spleen and then the splenic flexure of colon was mobilized and the colon was mobilized off the retroperitoneum. The tail of the pancreas was exposed and then I continued to mobilize the spleen laterally off the diaphragm and then I was able to flip the spleen and the tail of pancreas medially. Then, I continued laparoscopic mobilization. The falciform ligament was mobilized and preserved and then the back side of the stomach was mobilized off the anterior aspect of the pancreas. At this point, I performed intraoperative ultrasound and then also with manual palpation, I confirmed nodular changes and cystic changes extend all the way onto the portal vein and the superior mesenteric vein confluence. The resection margins can be really close and also the initial dissection revealed inflammatory changes onto the vein and artery and I felt at this point, the laparoscopy is not feasible to perform such extensive vascular dissection. At this point, I decided to switch the case to open.     I extended the minilaparotomy incision cephalad and caudad and cephalad to the xiphoid process and inferiorly right above the umbilicus and the Bookwalter retractor was placed and then we started to dissect the pancreas superiorly and I isolated the common hepatic arteries. A total of three lymph nodes were removed in front of these arteries and I followed proximally and distally. Proximally, I stopped right at the gastroduodenal artery. There were small branches into the pancreas. This was suture ligated and then I was able to see the anterior surface of the portal vein and then, I started to make a tunnel in front of the portal vein superiorly. Below the pancreas, I identified the mesocolic colic vein. This was isolated and then ligated and then I continued to follow this vein and I was able to identify the superior mesenteric vein below the pancreas and then I started to make a tunnel from below and then I were able to connect the tunnel, I made it superiorly and an umbilical tape was passed to help to open up the tunnel and then after careful dissections, I was able to pass Endo-JUSTINO stapler. This was reinforced with Seamguard. I was able to pull the pancreas towards the left side to get the stapler onto healthy pancreatic tissues. Then, the stapler was fired and then the gross margin appeared to be negative. Then, I lifted the pancreas laterally and I started dissection to dissect the pancreas off the common hepatic arteries. I was able to get down to the bifurcation between the splenic artery and the common hepatic artery. The splenic artery takeoff was isolated circumferentially. There were quite a bit adhesions around the artery, so I used a vascular stapler to divide the splenic arteries and then further dissection revealed the splenic vein. As noted, her inferior mesenteric vein and the splenic vein was drawn together.   The inferior mesenteric vein was dissected and protected and then I divided the splenic vein at its takeoff with endovascular stapler and then the pancreas was continued to lift off the retroperitoneum both medially and laterally and the specimen was then removed from the surgical field. We moved our attention to the gallbladder. The gallbladder was taken off the liver bed with cautery device and dissection was carried all the way down to the cystic artery and the cystic duct. Both was individually isolated and then tied with 0 silk and divided and the liver surface was examined for hemostasis. Lastly, we mobilized the falciform ligament. This was brought from the lesser curvature of the stomach onto the pancreatic transection site and I did spray Tisseel at the pancreatic stump and then the falciform ligament flap was brought in to cover the stump. Lastly, a #19 Ericka Carrel drain was placed under the left diaphragm towards the pancreatic stump and the surgical field was inspected. No evidence of bleeding at the end of the case and then the midline fascia was closed with #1 looped PDS in running fashion and skin was closed with staples. I did close the 12-mm trocar site on the anterior fascia with 0 Vicryl stitch. As noted, Orlin Sapp was the first assistant in this case. She offered excellent exposure, helped the stapling and suturing and it would be very dangerous to perform this case without her assistance.         Silviano Gutierrez MD      [id number]  D:  05/05/2021 11:47  T:  05/05/2021 14:11  JOB #:  [id number]

## 2021-05-05 NOTE — PROGRESS NOTES
Problem: Falls - Risk of  Goal: *Absence of Falls  Description: Document Nitin Chacon Fall Risk and appropriate interventions in the flowsheet.   Outcome: Progressing Towards Goal  Note: Fall Risk Interventions:  Mobility Interventions: OT consult for ADLs, Patient to call before getting OOB, PT Consult for mobility concerns         Medication Interventions: Assess postural VS orthostatic hypotension, Patient to call before getting OOB, Teach patient to arise slowly

## 2021-05-05 NOTE — ANESTHESIA PREPROCEDURE EVALUATION
Relevant Problems   No relevant active problems       Anesthetic History          Comments: Per outside documentation last two GI procedures laryngospasm x1 requiring intubation     Review of Systems / Medical History  Patient summary reviewed, nursing notes reviewed and pertinent labs reviewed    Pulmonary  Within defined limits                 Neuro/Psych         Psychiatric history     Cardiovascular    Hypertension: well controlled        Dysrhythmias (atrial tachycardia)       Exercise tolerance: >4 METS  Comments: Denies any recent angina, botello, syncopal episodes. No palpitations or recent changes in functional status. GI/Hepatic/Renal     GERD: well controlled           Endo/Other    Diabetes: well controlled, type 2         Other Findings            Physical Exam    Airway  Mallampati: I  TM Distance: 4 - 6 cm  Neck ROM: normal range of motion   Mouth opening: Normal     Cardiovascular  Regular rate and rhythm,  S1 and S2 normal,  no murmur, click, rub, or gallop             Dental  No notable dental hx       Pulmonary  Breath sounds clear to auscultation               Abdominal         Other Findings            Anesthetic Plan    ASA: 2  Anesthesia type: general          Induction: Intravenous  Anesthetic plan and risks discussed with: Patient and Spouse      2nd PIV. Possible a-line depending surgical course. Discussed potential PNB pending course.

## 2021-05-05 NOTE — BRIEF OP NOTE
Brief Postoperative Note    Patient: Cisco Oconnor  YOB: 1945  MRN: [id number]    Date of Procedure: 5/5/2021     Pre-Op Diagnosis: IPMN (intraductal papillary mucinous neoplasm) [D49.0]    Post-Op Diagnosis: Same as preoperative diagnosis. Procedure(s):  DISTAL SUBTOTAL PANCREATECTOMY AND SPLENECTOMY LAPAROSCOPIC CONVERTED TO OPEN  Extensive vascular dissection  CHOLECYSTECTOMY,  Falciform ligament flap  Intraoperative US    Surgeon(s): Rai Morrissey MD    Surgical Assistant: Nurse Practitioner: Odessa Navarrete NP    Anesthesia: General     Estimated Blood Loss (mL): less than 214     Complications: None    Specimens:   ID Type Source Tests Collected by Time Destination   1 : Common hepatic lymph node x3 Preservative Abdomen  Rai Morrissey MD 5/5/2021 5953 Pathology   2 : Distal pancreatectomy and splenectomy  Fresh Abdomen  Rai Morrissey MD 5/5/2021 1033 Pathology   3 : Gallbladder Preservative Abdomen  Rai Morrissey MD 5/5/2021 1044 Pathology        Implants: * No implants in log *    Drains:   [facility] Pac #1 05/05/21 Left Abdomen (Active)       Findings:  1. Indurated pancreas with multiple nodular changes and surrounding adhesions involving from the neck to tail of pancreas;  2. The nodular and inflammatory changes extend onto portal vein/SMV confluence with adhesions to celiac trunk and common hepatic artery, extensive vascular dissection is performed, which can't be safely done laparoscopically.      Electronically Signed by Alex Hartley MD on 5/5/2021 at 11:24 AM

## 2021-05-05 NOTE — ANESTHESIA PROCEDURE NOTES
Peripheral Block    Start time: 5/5/2021 11:23 AM  End time: 5/5/2021 11:26 AM  Performed by: Mayte Staley MD  Authorized by: Mayte Staley MD       Pre-procedure:    Indications: at surgeon's request and post-op pain management    Preanesthetic Checklist: patient identified, risks and benefits discussed, site marked, timeout performed, anesthesia consent given and patient being monitored    Timeout Time: 11:23          Block Type:   Block Type:  Rectus sheath  Laterality:  Right  Monitoring:  Standard ASA monitoring, oxygen, continuous pulse ox, frequent vital sign checks and heart rate  Injection Technique:  Single shot  Procedures: ultrasound guided    Patient Position: supine  Prep: chlorhexidine    Location:  Abdominal  Needle Type:  Stimuplex  Needle Gauge:  20 G  Needle Localization:  Ultrasound guidance  Medication Injected:  Ropivacaine (PF) (NAROPIN)(0.5%) 5 mg/mL injection, 15 mL  Med Admin Time: 5/5/2021 11:26 AM    Assessment:  Number of attempts:  1  Injection Assessment:  Incremental injection every 5 mL, no paresthesia, ultrasound image on chart, local visualized surrounding nerve on ultrasound, negative aspiration for blood and no intravascular symptoms  Patient tolerance:  Patient tolerated the procedure well with no immediate complications  +5 cc NS

## 2021-05-05 NOTE — ROUTINE PROCESS
TRANSFER - IN REPORT:    Verbal report received from [facility] on Abby Dias  being received from  PACU for routine post - op      Report consisted of patients Situation, Background, Assessment and   Recommendations(SBAR). Information from the following report(s) OR Summary, Procedure Summary, Intake/Output, MAR, Accordion, Recent Results, Med Rec Status, Cardiac Rhythm sinus rhythm and Quality Measures was reviewed with the receiving nurse. Opportunity for questions and clarification was provided. Assessment completed upon patients arrival to unit and care assumed.

## 2021-05-05 NOTE — H&P
[facility]  [address] [place] [address]  [phone]      Patient:  Lizette Garcia  : 1945     HPI  Lizette Garcia is a 77 y.o. female  who was referred to the office by Dr. Justin Medeiros presents with complaints of Pancreatic cyst, appears to be main duct IPMN. she has underwent EUS in  and then MRI 3/21. Appears to be getting slightly larger and communicating with main duct. Mass involves pancreas  body per EUS. She has had a cyst since 2018 and had yearly workup. No history of pancreatitis. She complains of belching, mid sternal pain and back pain, but states her GI switched medication and she feels these symptoms are somewhat better. She also complains of right subcostal pain with eating. She does complain of constipation going every 3-4 days. She was diagnosed with DM last year. No current chest pain or shortness of breath. Denies history of smoking and drinks occassionally.      Family history of cancer- Father lung cancer  Medical history includes Diabetes Mellitus , HTN and A-fib,  Surgery includes- no abdominal surgeries. does take blood thinners- ASA 81mg and take flecainide for her A-fib             Past Medical History:   Diagnosis Date    Arrhythmia      Hypercholesterolemia      Hypertension               Current Outpatient Medications   Medication Sig Dispense Refill    aspirin delayed-release 81 mg tablet Take  by mouth daily.        atenoloL (TENORMIN) 25 mg tablet Take 25 mg by mouth daily.        flecainide (TAMBOCOR) 150 mg tablet Take 150 mg by mouth two (2) times a day.        pravastatin (PRAVACHOL) 10 mg tablet Take  by mouth nightly.        sertraline (ZOLOFT) 25 mg tablet Take  by mouth daily.        amLODIPine (NORVASC) 5 mg tablet Take 5 mg by mouth daily.        triamcinolone (ARISTOCORT) 0.5 % topical cream Apply  to affected area two (2) times a day.  use thin layer        pantoprazole (Protonix) 40 mg tablet Take 40 mg by mouth daily.          No Known Allergies        Past Surgical History:   Procedure Laterality Date    HX GYN         uterine fibroid    HX HEENT         tonsilectomy    AL BREAST SURGERY PROCEDURE UNLISTED Right       breast      History reviewed. No pertinent family history. Social History           Tobacco Use    Smoking status: Never Smoker    Smokeless tobacco: Never Used   Substance Use Topics    Alcohol use: Not on file         Review of Systems   A comprehensive review of systems was negative except for that written in the HPI. Physical Exam  Visit Vitals  BP (!) 140/78   Pulse 75   Ht 5' 2\" (1.575 m)   Wt 148 lb (67.1 kg)   SpO2 95%   BMI 27.07 kg/m²         General:          Alert, oriented, cooperative, awake patient in no acute distress   Skin:                Warm, moist with good texture   Eyes:               Sclera are clear, extraocular muscles intact  HENT:             Normocephalic; oral mucosa moist, nares patent; neck is supple; trachea midline  Respiratory:     Lungs clear to auscultation bilaterally, breathing is non-labored   Chest:              Symmetric throughout one respiratory excursion; no supraclavicular lymphadenopathy  CV:                  Regular rate and rhythm, no appreciable murmurs, rubs, gallops  Abdomen:        Soft,  non-distended; bowel sounds are normoactive   Extremities:     No cyanosis, clubbing or edema  Neurological:   No focal signs           MRI outside facility 3/2021             EUS      Cytology      Assessment/Plan:   Lawrence Castillo is a 77 y.o. female who has signs and symptoms consistent with mixed side branch/main duct IPMN. of pancreas located in body and tail. Discussed IPMN are pre cancerous, especially main duct type has higher risk and would recommend surgical removal to minimize cancer risk.       Her symptoms are also consistent with chronic cholecystitis and she states she had a HIDA scan that showed low EF.   We discussed cholecystectomy and distal pancreatectomy at the same time. She is scheduled for vacation in April and would like to go on vacation and schedule afterwards. She will discuss with her  and will call office if she wishes to schedule.     Call office to schedule cholecystectomy and distal pancreatectomy/splenectomy, which may be done laparoscopically. Need to review her outside film to decide the level of pancreatic transection.  As noted, the residual pancreas is also at risk for cancer even it's not harboring IPMN at present time, and will need monitoring.      Alex Hartley MD

## 2021-05-05 NOTE — ANESTHESIA POSTPROCEDURE EVALUATION
Procedure(s):  DISTAL PANCREATECTOMY AND SPLENECTOMY LAPAROSCOPIC, CONVERTED TO OPEN; CHOLECYSTECTOMY; EXTENSIVE VASCULAR DISSECTION;  FALCIFORM LIGAMENT FLAP; INTRAOPERATIVE US.    general    Anesthesia Post Evaluation      Multimodal analgesia: multimodal analgesia used between 6 hours prior to anesthesia start to PACU discharge  Patient location during evaluation: bedside  Patient participation: complete - patient participated  Level of consciousness: responsive to verbal stimuli  Pain management: adequate  Airway patency: patent  Anesthetic complications: no  Cardiovascular status: hemodynamically stable  Respiratory status: spontaneous ventilation  Hydration status: stable    Final Post Anesthesia Temperature Assessment:  Normothermia (36.0-37.5 degrees C)      INITIAL Post-op Vital signs:   Vitals Value Taken Time   /57 05/05/21 1615   Temp 36.8 °C (98.2 °F) 05/05/21 1430   Pulse 71 05/05/21 1621   Resp 18 05/05/21 1559   SpO2 87 % 05/05/21 1621   Vitals shown include unvalidated device data.

## 2021-05-05 NOTE — ROUTINE PROCESS
END OF SHIFT NOTE:    INTAKE/OUTPUT  No intake/output data recorded. Voiding: NO  Catheter: YES  Drain:   Nasogastric Tube 05/05/21 (Active)   Site Assessment Clean, dry, & intact 05/05/21 1756   Securement Device Tape 05/05/21 1756   G Port Status Intermittent Suction 05/05/21 1756   External Insertion Mikey (cms) 45 cms 05/05/21 1756   Action Taken Placement verified (comment) 05/05/21 1756   Drainage Description Green 05/05/21 1756       Shaq Drain #1 05/05/21 Left Abdomen (Active)   Site Assessment Clean, dry, & intact 05/05/21 1756   Dressing Status Clean, dry, & intact 05/05/21 1756   Drainage Description Serosanguinous 05/05/21 1756   Shaq Drain Airleak No 05/05/21 1756   Status Patent; Charged;Draining 05/05/21 1756   Y Connector Used No 05/05/21 1756   Output (ml) 20 ml 05/05/21 1756               Flatus: Patient does not have flatus present. Stool:  0 occurrences. Characteristics:       Emesis: 0 occurrences.     Characteristics:        VITAL SIGNS  Patient Vitals for the past 12 hrs:   Temp Pulse Resp BP SpO2   05/05/21 1821 -- 71 -- 133/62 --   05/05/21 1756 98 °F (36.7 °C) 71 18 133/62 93 %   05/05/21 1659 -- 70 -- (!) 105/51 97 %   05/05/21 1644 -- 70 -- (!) 102/47 96 %   05/05/21 1630 -- 67 -- (!) 124/56 97 %   05/05/21 1615 -- 72 -- (!) 111/57 96 %   05/05/21 1559 -- 71 18 (!) 120/58 96 %   05/05/21 1544 -- 69 18 (!) 121/58 96 %   05/05/21 1530 -- 69 20 (!) 116/58 (!) 88 %   05/05/21 1514 -- 69 18 (!) 110/54 92 %   05/05/21 1459 -- 67 18 (!) 116/58 94 %   05/05/21 1444 -- 68 16 (!) 114/56 96 %   05/05/21 1430 98.2 °F (36.8 °C) 66 18 (!) 117/57 96 %   05/05/21 1414 -- 66 16 (!) 116/58 96 %   05/05/21 1359 -- 64 18 (!) 112/57 95 %   05/05/21 1344 -- 64 18 (!) 114/59 96 %   05/05/21 1330 -- 64 20 (!) 108/56 94 %   05/05/21 1314 -- 64 18 (!) 107/54 95 %   05/05/21 1259 -- 62 16 (!) 101/54 93 %   05/05/21 1254 -- 63 18 (!) 105/53 95 %   05/05/21 1249 98 °F (36.7 °C) 62 16 (!) 101/51 94 %   05/05/21 1244 -- 61 18 (!) 105/50 95 %   05/05/21 1240 -- 62 14 (!) 102/55 94 %   05/05/21 1235 -- 63 16 (!) 111/54 93 %   05/05/21 1230 -- 62 12 (!) 113/56 98 %   05/05/21 1225 -- 63 16 (!) 121/56 97 %   05/05/21 1219 -- 63 12 (!) 111/56 98 %   05/05/21 1217 98.2 °F (36.8 °C) 63 12 (!) 111/54 98 %   05/05/21 1216 -- 62 12 (!) 111/54 97 %       Pain Assessment  Pain Intensity 1: 4 (05/05/21 1857)  Pain Location 1: Abdomen  Pain Intervention(s) 1: Medication (see MAR)  Patient Stated Pain Goal: 0    Ambulating  No    Shift report given to oncoming nurse at the bedside.     Mal Moser RN

## 2021-05-05 NOTE — PERIOP NOTES
Family updated at 8:38 AM by Gal Marti RN. Four digit code obtained from . Family updated at 10:29 AM by Gal Marti RN. Four digit code obtained from .

## 2021-05-06 LAB
ANION GAP SERPL CALC-SCNC: 5 MMOL/L (ref 7–16)
BASOPHILS # BLD: 0 K/UL (ref 0–0.2)
BASOPHILS NFR BLD: 0 % (ref 0–2)
BUN SERPL-MCNC: 20 MG/DL (ref 8–23)
CALCIUM SERPL-MCNC: 8.8 MG/DL (ref 8.3–10.4)
CHLORIDE SERPL-SCNC: 105 MMOL/L (ref 98–107)
CO2 SERPL-SCNC: 31 MMOL/L (ref 21–32)
CREAT SERPL-MCNC: 0.98 MG/DL (ref 0.6–1)
DIFFERENTIAL METHOD BLD: ABNORMAL
EOSINOPHIL # BLD: 0 K/UL (ref 0–0.8)
EOSINOPHIL NFR BLD: 0 % (ref 0.5–7.8)
ERYTHROCYTE [DISTWIDTH] IN BLOOD BY AUTOMATED COUNT: 13.2 % (ref 11.9–14.6)
GLUCOSE BLD STRIP.AUTO-MCNC: 156 MG/DL (ref 65–100)
GLUCOSE BLD STRIP.AUTO-MCNC: 168 MG/DL (ref 65–100)
GLUCOSE BLD STRIP.AUTO-MCNC: 199 MG/DL (ref 65–100)
GLUCOSE BLD STRIP.AUTO-MCNC: 238 MG/DL (ref 65–100)
GLUCOSE SERPL-MCNC: 165 MG/DL (ref 65–100)
HCT VFR BLD AUTO: 40.3 % (ref 35.8–46.3)
HGB BLD-MCNC: 12.6 G/DL (ref 11.7–15.4)
IMM GRANULOCYTES # BLD AUTO: 0.1 K/UL (ref 0–0.5)
IMM GRANULOCYTES NFR BLD AUTO: 1 % (ref 0–5)
LYMPHOCYTES # BLD: 1.2 K/UL (ref 0.5–4.6)
LYMPHOCYTES NFR BLD: 5 % (ref 13–44)
MCH RBC QN AUTO: 29.6 PG (ref 26.1–32.9)
MCHC RBC AUTO-ENTMCNC: 31.3 G/DL (ref 31.4–35)
MCV RBC AUTO: 94.6 FL (ref 79.6–97.8)
MONOCYTES # BLD: 2.7 K/UL (ref 0.1–1.3)
MONOCYTES NFR BLD: 12 % (ref 4–12)
NEUTS SEG # BLD: 19 K/UL (ref 1.7–8.2)
NEUTS SEG NFR BLD: 82 % (ref 43–78)
NRBC # BLD: 0 K/UL (ref 0–0.2)
PLATELET # BLD AUTO: 202 K/UL (ref 150–450)
PMV BLD AUTO: 11.1 FL (ref 9.4–12.3)
POTASSIUM SERPL-SCNC: 4.4 MMOL/L (ref 3.5–5.1)
RBC # BLD AUTO: 4.26 M/UL (ref 4.05–5.2)
SERVICE CMNT-IMP: ABNORMAL
SODIUM SERPL-SCNC: 141 MMOL/L (ref 136–145)
WBC # BLD AUTO: 23.1 K/UL (ref 4.3–11.1)

## 2021-05-06 PROCEDURE — 85025 COMPLETE CBC W/AUTO DIFF WBC: CPT

## 2021-05-06 PROCEDURE — C9113 INJ PANTOPRAZOLE SODIUM, VIA: HCPCS | Performed by: NURSE PRACTITIONER

## 2021-05-06 PROCEDURE — 74011636637 HC RX REV CODE- 636/637: Performed by: NURSE PRACTITIONER

## 2021-05-06 PROCEDURE — 82962 GLUCOSE BLOOD TEST: CPT

## 2021-05-06 PROCEDURE — 74011000258 HC RX REV CODE- 258: Performed by: NURSE PRACTITIONER

## 2021-05-06 PROCEDURE — 36415 COLL VENOUS BLD VENIPUNCTURE: CPT

## 2021-05-06 PROCEDURE — 97530 THERAPEUTIC ACTIVITIES: CPT

## 2021-05-06 PROCEDURE — 80048 BASIC METABOLIC PNL TOTAL CA: CPT

## 2021-05-06 PROCEDURE — 65270000029 HC RM PRIVATE

## 2021-05-06 PROCEDURE — 74011250636 HC RX REV CODE- 250/636: Performed by: NURSE PRACTITIONER

## 2021-05-06 PROCEDURE — 97162 PT EVAL MOD COMPLEX 30 MIN: CPT

## 2021-05-06 PROCEDURE — 74011000250 HC RX REV CODE- 250: Performed by: NURSE PRACTITIONER

## 2021-05-06 PROCEDURE — 74011250637 HC RX REV CODE- 250/637: Performed by: NURSE PRACTITIONER

## 2021-05-06 PROCEDURE — 2709999900 HC NON-CHARGEABLE SUPPLY

## 2021-05-06 RX ORDER — SERTRALINE HYDROCHLORIDE 50 MG/1
50 TABLET, FILM COATED ORAL DAILY
Status: DISCONTINUED | OUTPATIENT
Start: 2021-05-06 | End: 2021-05-10 | Stop reason: HOSPADM

## 2021-05-06 RX ORDER — ACETAMINOPHEN 500 MG
1000 TABLET ORAL EVERY 6 HOURS
Status: DISCONTINUED | OUTPATIENT
Start: 2021-05-06 | End: 2021-05-07

## 2021-05-06 RX ORDER — GABAPENTIN 300 MG/1
300 CAPSULE ORAL 3 TIMES DAILY
Status: DISCONTINUED | OUTPATIENT
Start: 2021-05-06 | End: 2021-05-10 | Stop reason: HOSPADM

## 2021-05-06 RX ORDER — ATENOLOL 25 MG/1
25 TABLET ORAL DAILY
Status: DISCONTINUED | OUTPATIENT
Start: 2021-05-06 | End: 2021-05-10 | Stop reason: HOSPADM

## 2021-05-06 RX ORDER — ASPIRIN 81 MG/1
81 TABLET ORAL DAILY
Status: DISCONTINUED | OUTPATIENT
Start: 2021-05-06 | End: 2021-05-10 | Stop reason: HOSPADM

## 2021-05-06 RX ORDER — FLECAINIDE ACETATE 100 MG/1
150 TABLET ORAL DAILY
Status: DISCONTINUED | OUTPATIENT
Start: 2021-05-06 | End: 2021-05-10 | Stop reason: HOSPADM

## 2021-05-06 RX ORDER — PRAVASTATIN SODIUM 20 MG/1
10 TABLET ORAL
Status: DISCONTINUED | OUTPATIENT
Start: 2021-05-06 | End: 2021-05-10 | Stop reason: HOSPADM

## 2021-05-06 RX ORDER — HYDROMORPHONE HYDROCHLORIDE 1 MG/ML
0.5 INJECTION, SOLUTION INTRAMUSCULAR; INTRAVENOUS; SUBCUTANEOUS
Status: DISCONTINUED | OUTPATIENT
Start: 2021-05-06 | End: 2021-05-10 | Stop reason: HOSPADM

## 2021-05-06 RX ORDER — AMLODIPINE BESYLATE 5 MG/1
5 TABLET ORAL DAILY
Status: DISCONTINUED | OUTPATIENT
Start: 2021-05-06 | End: 2021-05-10 | Stop reason: HOSPADM

## 2021-05-06 RX ADMIN — Medication 10 ML: at 15:12

## 2021-05-06 RX ADMIN — HYDROMORPHONE HYDROCHLORIDE 1 MG: 1 INJECTION, SOLUTION INTRAMUSCULAR; INTRAVENOUS; SUBCUTANEOUS at 08:55

## 2021-05-06 RX ADMIN — GABAPENTIN 300 MG: 300 CAPSULE ORAL at 21:31

## 2021-05-06 RX ADMIN — ASPIRIN 81 MG: 81 TABLET ORAL at 11:55

## 2021-05-06 RX ADMIN — INSULIN HUMAN 6 UNITS: 100 INJECTION, SOLUTION PARENTERAL at 18:30

## 2021-05-06 RX ADMIN — METRONIDAZOLE 500 MG: 500 INJECTION, SOLUTION INTRAVENOUS at 06:00

## 2021-05-06 RX ADMIN — METOPROLOL TARTRATE 2.5 MG: 5 INJECTION INTRAVENOUS at 05:23

## 2021-05-06 RX ADMIN — INSULIN HUMAN 3 UNITS: 100 INJECTION, SOLUTION PARENTERAL at 00:12

## 2021-05-06 RX ADMIN — ACETAMINOPHEN 1000 MG: 500 TABLET, FILM COATED ORAL at 18:30

## 2021-05-06 RX ADMIN — ACETAMINOPHEN 1000 MG: 500 TABLET, FILM COATED ORAL at 11:55

## 2021-05-06 RX ADMIN — SERTRALINE HYDROCHLORIDE 50 MG: 50 TABLET ORAL at 11:55

## 2021-05-06 RX ADMIN — CEFAZOLIN SODIUM 1 G: 1 INJECTION, POWDER, FOR SOLUTION INTRAMUSCULAR; INTRAVENOUS at 01:26

## 2021-05-06 RX ADMIN — SODIUM CHLORIDE, SODIUM LACTATE, POTASSIUM CHLORIDE, AND CALCIUM CHLORIDE 100 ML/HR: 600; 310; 30; 20 INJECTION, SOLUTION INTRAVENOUS at 05:30

## 2021-05-06 RX ADMIN — PANTOPRAZOLE SODIUM 40 MG: 40 INJECTION, POWDER, FOR SOLUTION INTRAVENOUS at 08:55

## 2021-05-06 RX ADMIN — FLECAINIDE ACETATE 150 MG: 100 TABLET ORAL at 12:03

## 2021-05-06 RX ADMIN — GABAPENTIN 300 MG: 300 CAPSULE ORAL at 11:55

## 2021-05-06 RX ADMIN — Medication 10 ML: at 21:31

## 2021-05-06 RX ADMIN — PRAVASTATIN SODIUM 10 MG: 20 TABLET ORAL at 21:31

## 2021-05-06 RX ADMIN — Medication 10 ML: at 05:23

## 2021-05-06 RX ADMIN — ACETAMINOPHEN 1000 MG: 500 TABLET, FILM COATED ORAL at 23:46

## 2021-05-06 RX ADMIN — INSULIN HUMAN 3 UNITS: 100 INJECTION, SOLUTION PARENTERAL at 05:22

## 2021-05-06 RX ADMIN — AMLODIPINE BESYLATE 5 MG: 5 TABLET ORAL at 11:55

## 2021-05-06 RX ADMIN — Medication 10 ML: at 00:18

## 2021-05-06 RX ADMIN — SODIUM CHLORIDE, SODIUM LACTATE, POTASSIUM CHLORIDE, AND CALCIUM CHLORIDE 100 ML/HR: 600; 310; 30; 20 INJECTION, SOLUTION INTRAVENOUS at 16:56

## 2021-05-06 RX ADMIN — ENOXAPARIN SODIUM 40 MG: 40 INJECTION SUBCUTANEOUS at 08:55

## 2021-05-06 RX ADMIN — HYDROMORPHONE HYDROCHLORIDE 1 MG: 1 INJECTION, SOLUTION INTRAMUSCULAR; INTRAVENOUS; SUBCUTANEOUS at 01:36

## 2021-05-06 RX ADMIN — INSULIN HUMAN 3 UNITS: 100 INJECTION, SOLUTION PARENTERAL at 12:04

## 2021-05-06 RX ADMIN — ATENOLOL 25 MG: 25 TABLET ORAL at 12:03

## 2021-05-06 RX ADMIN — GABAPENTIN 300 MG: 300 CAPSULE ORAL at 16:56

## 2021-05-06 NOTE — PROGRESS NOTES
CM met with patient to complete assessment. Spouse present at bedside. Patient presented groggy, but able to provide clear information. Demographic information verified by the patient. The patient lives with her spouse Mirna Varela [phone] in a one story home with no steps at the entrance. Patient confirmed prior to this hospital visit, the patient is independent with completing all ADL's and drives. DME:NONE    Patient obtains her prescription medications from [facility] on [address]. Patient voiced no difficulty with obtaining medications in the community. Discharge planning: PT has been consulted. Patient denies any history of [facility] services or STR. Dispo: TBD at this time. CM will monitor plan of care and therapy recommendations, to assist further with discharge planning. Please consult or notify CM if any needs shall arise. CM continues to monitor plan of care. Care Management Interventions  PCP Verified by CM: Yes  Mode of Transport at Discharge: Other (see comment)(Spouse Mirna Varela [phone].)  Transition of Care Consult (CM Consult): Discharge Planning  Discharge Durable Medical Equipment: No  Physical Therapy Consult: Yes  Occupational Therapy Consult: No  Speech Therapy Consult: No  Current Support Network: Lives with Spouse, Own Home(Patient lives with her spouse Mirna Varela [phone]. .)  Confirm Follow Up Transport: Self  Name of the Patient Representative Who was Provided with a Choice of Provider and Agrees with the Discharge Plan: Patient.     Resource Information Provided?: No  Discharge Location  Discharge Placement: Home

## 2021-05-06 NOTE — PROGRESS NOTES
5/6/2021    PLAN:  Continue present care  DIET CLEAR LIQUID  IVFs  SCD/IS/Protonix for prophylactic measures  SCD's or Sequential Compression Device and Lovenox  OOB and ambulate  Pain/nausea control  Monitor labs  Will need post splenectomy vaccines  DC NGT  DC woody  PTA medication added            ASSESSMENT:  Admit Date: 5/5/2021    Procedure(s):  DISTAL PANCREATECTOMY AND SPLENECTOMY LAPAROSCOPIC, CONVERTED TO OPEN; CHOLECYSTECTOMY; EXTENSIVE VASCULAR DISSECTION;  FALCIFORM LIGAMENT FLAP; INTRAOPERATIVE US      SUBJECTIVE:  05/06/21 1 Day Post-Op awake in bed, no complaints. Pain controlled. -flatus or BM. Denies nausea or vomiting.     OBJECTIVE:  Patient Vitals for the past 24 hrs:   Temp Pulse Resp BP SpO2   05/06/21 0655 98 °F (36.7 °C) 66 16 (!) 152/55 96 %   05/06/21 0500 97.8 °F (36.6 °C) 67 16 133/60 96 %   05/05/21 2356 97.7 °F (36.5 °C) 73 18 (!) 148/72 96 %   05/05/21 1949 97.4 °F (36.3 °C) 69 18 (!) 122/58 96 %   05/05/21 1821 -- 71 -- 133/62 --   05/05/21 1756 98 °F (36.7 °C) 71 18 133/62 93 %   05/05/21 1659 -- 70 -- (!) 105/51 97 %   05/05/21 1644 -- 70 -- (!) 102/47 96 %   05/05/21 1630 -- 67 -- (!) 124/56 97 %   05/05/21 1615 -- 72 -- (!) 111/57 96 %   05/05/21 1559 -- 71 18 (!) 120/58 96 %   05/05/21 1544 -- 69 18 (!) 121/58 96 %   05/05/21 1530 -- 69 20 (!) 116/58 (!) 88 %   05/05/21 1514 -- 69 18 (!) 110/54 92 %   05/05/21 1459 -- 67 18 (!) 116/58 94 %   05/05/21 1444 -- 68 16 (!) 114/56 96 %   05/05/21 1430 98.2 °F (36.8 °C) 66 18 (!) 117/57 96 %   05/05/21 1414 -- 66 16 (!) 116/58 96 %   05/05/21 1359 -- 64 18 (!) 112/57 95 %   05/05/21 1344 -- 64 18 (!) 114/59 96 %   05/05/21 1330 -- 64 20 (!) 108/56 94 %   05/05/21 1314 -- 64 18 (!) 107/54 95 %   05/05/21 1259 -- 62 16 (!) 101/54 93 %   05/05/21 1254 -- 63 18 (!) 105/53 95 %   05/05/21 1249 98 °F (36.7 °C) 62 16 (!) 101/51 94 %   05/05/21 1244 -- 61 18 (!) 105/50 95 % 05/05/21 1240 -- 62 14 (!) 102/55 94 %   05/05/21 1235 -- 63 16 (!) 111/54 93 %   05/05/21 1230 -- 62 12 (!) 113/56 98 %   05/05/21 1225 -- 63 16 (!) 121/56 97 %   05/05/21 1219 -- 63 12 (!) 111/56 98 %   05/05/21 1217 98.2 °F (36.8 °C) 63 12 (!) 111/54 98 %   05/05/21 1216 -- 62 12 (!) 111/54 97 %         Date 05/05/21 0700 - 05/06/21 0659 05/06/21 0700 - 05/07/21 0659   Shift 0966-6587 9484-7148 24 Hour Total 7732-6471 8038-6134 24 Hour Total   INTAKE   I.V.(mL/kg/hr) 1550(1.9)  1550(1)        Volume (lactated Ringers infusion) 1550  1550      Shift Total(mL/kg) 1550(23.3)  1550(23.3)      OUTPUT   Urine(mL/kg/hr) 120(0.2) 600(0.8) 720(0.5)        Urine Output 120  120        Urine Output (mL) (Urinary Catheter 05/05/21)  600 600      Emesis/NG output  50 50        Output (ml) (Nasogastric Tube 05/05/21)  50 50      Drains 20 10 30        Output (ml) (Shaq Drain #1 05/05/21 Left Abdomen) 20 10 30      Blood 10  10        Estimated Blood Loss 10  10      Shift Total(mL/kg) 150(2.3) 660(9.9) 810(12.2)      NET 1400 -660 740      Weight (kg) 66.5 66.5 66.5 66.5 66.5 66.5            General:          No acute distress    Lungs:             Even and unlabored   Heart:              RRR  Abdomen:        Soft, Non distended, appropriately tender. Dressing C/D/I.  Ericka Carrel with serosanguineous drainage    Extremities:     No cyanosis, clubbing or edema  Neurologic:      No focal deficits           Labs:    Recent Labs     05/06/21  0533   WBC 23.1*   HGB 12.6         K 4.4      CO2 31   BUN 20   CREA 0.98   *         Crystal Toribio Aparicio, NP

## 2021-05-06 NOTE — PROGRESS NOTES
Hourly rounds performed this shift. Patient denies needs at this time. Bed in low position and locked. The call light and personal items are in reach. Will continue to monitor and report to oncoming nurse.

## 2021-05-07 LAB
ANION GAP SERPL CALC-SCNC: 4 MMOL/L (ref 7–16)
BASOPHILS # BLD: 0 K/UL (ref 0–0.2)
BASOPHILS NFR BLD: 0 % (ref 0–2)
BUN SERPL-MCNC: 15 MG/DL (ref 8–23)
CALCIUM SERPL-MCNC: 8.4 MG/DL (ref 8.3–10.4)
CHLORIDE SERPL-SCNC: 106 MMOL/L (ref 98–107)
CO2 SERPL-SCNC: 32 MMOL/L (ref 21–32)
CREAT SERPL-MCNC: 0.69 MG/DL (ref 0.6–1)
DIFFERENTIAL METHOD BLD: ABNORMAL
EOSINOPHIL # BLD: 0 K/UL (ref 0–0.8)
EOSINOPHIL NFR BLD: 0 % (ref 0.5–7.8)
ERYTHROCYTE [DISTWIDTH] IN BLOOD BY AUTOMATED COUNT: 13.3 % (ref 11.9–14.6)
GLUCOSE BLD STRIP.AUTO-MCNC: 138 MG/DL (ref 65–100)
GLUCOSE BLD STRIP.AUTO-MCNC: 149 MG/DL (ref 65–100)
GLUCOSE BLD STRIP.AUTO-MCNC: 155 MG/DL (ref 65–100)
GLUCOSE BLD STRIP.AUTO-MCNC: 178 MG/DL (ref 65–100)
GLUCOSE BLD STRIP.AUTO-MCNC: 187 MG/DL (ref 65–100)
GLUCOSE BLD STRIP.AUTO-MCNC: 226 MG/DL (ref 65–100)
GLUCOSE SERPL-MCNC: 160 MG/DL (ref 65–100)
HCT VFR BLD AUTO: 35.3 % (ref 35.8–46.3)
HGB BLD-MCNC: 11.3 G/DL (ref 11.7–15.4)
IMM GRANULOCYTES # BLD AUTO: 0.1 K/UL (ref 0–0.5)
IMM GRANULOCYTES NFR BLD AUTO: 1 % (ref 0–5)
LYMPHOCYTES # BLD: 2.2 K/UL (ref 0.5–4.6)
LYMPHOCYTES NFR BLD: 9 % (ref 13–44)
MCH RBC QN AUTO: 30 PG (ref 26.1–32.9)
MCHC RBC AUTO-ENTMCNC: 32 G/DL (ref 31.4–35)
MCV RBC AUTO: 93.6 FL (ref 79.6–97.8)
MONOCYTES # BLD: 3.2 K/UL (ref 0.1–1.3)
MONOCYTES NFR BLD: 14 % (ref 4–12)
NEUTS SEG # BLD: 18.2 K/UL (ref 1.7–8.2)
NEUTS SEG NFR BLD: 76 % (ref 43–78)
NRBC # BLD: 0 K/UL (ref 0–0.2)
PLATELET # BLD AUTO: 176 K/UL (ref 150–450)
PMV BLD AUTO: 11.1 FL (ref 9.4–12.3)
POTASSIUM SERPL-SCNC: 3.9 MMOL/L (ref 3.5–5.1)
RBC # BLD AUTO: 3.77 M/UL (ref 4.05–5.2)
SERVICE CMNT-IMP: ABNORMAL
SODIUM SERPL-SCNC: 142 MMOL/L (ref 136–145)
WBC # BLD AUTO: 23.9 K/UL (ref 4.3–11.1)

## 2021-05-07 PROCEDURE — 65270000029 HC RM PRIVATE

## 2021-05-07 PROCEDURE — 94761 N-INVAS EAR/PLS OXIMETRY MLT: CPT

## 2021-05-07 PROCEDURE — 74011000250 HC RX REV CODE- 250: Performed by: NURSE PRACTITIONER

## 2021-05-07 PROCEDURE — 74011250637 HC RX REV CODE- 250/637: Performed by: NURSE PRACTITIONER

## 2021-05-07 PROCEDURE — 82962 GLUCOSE BLOOD TEST: CPT

## 2021-05-07 PROCEDURE — 97530 THERAPEUTIC ACTIVITIES: CPT

## 2021-05-07 PROCEDURE — 97110 THERAPEUTIC EXERCISES: CPT

## 2021-05-07 PROCEDURE — 74011250636 HC RX REV CODE- 250/636: Performed by: NURSE PRACTITIONER

## 2021-05-07 PROCEDURE — 74011250637 HC RX REV CODE- 250/637: Performed by: SURGERY

## 2021-05-07 PROCEDURE — 74011636637 HC RX REV CODE- 636/637: Performed by: NURSE PRACTITIONER

## 2021-05-07 PROCEDURE — 85025 COMPLETE CBC W/AUTO DIFF WBC: CPT

## 2021-05-07 PROCEDURE — 80048 BASIC METABOLIC PNL TOTAL CA: CPT

## 2021-05-07 PROCEDURE — 2709999900 HC NON-CHARGEABLE SUPPLY

## 2021-05-07 PROCEDURE — 77010033678 HC OXYGEN DAILY

## 2021-05-07 PROCEDURE — C9113 INJ PANTOPRAZOLE SODIUM, VIA: HCPCS | Performed by: NURSE PRACTITIONER

## 2021-05-07 PROCEDURE — 36415 COLL VENOUS BLD VENIPUNCTURE: CPT

## 2021-05-07 RX ORDER — ACETAMINOPHEN 500 MG
1000 TABLET ORAL EVERY 8 HOURS
Status: DISCONTINUED | OUTPATIENT
Start: 2021-05-07 | End: 2021-05-10

## 2021-05-07 RX ORDER — AMOXICILLIN 250 MG
2 CAPSULE ORAL 2 TIMES DAILY
Status: DISCONTINUED | OUTPATIENT
Start: 2021-05-07 | End: 2021-05-10 | Stop reason: HOSPADM

## 2021-05-07 RX ADMIN — Medication 10 ML: at 14:18

## 2021-05-07 RX ADMIN — AMLODIPINE BESYLATE 5 MG: 5 TABLET ORAL at 09:38

## 2021-05-07 RX ADMIN — PRAVASTATIN SODIUM 10 MG: 20 TABLET ORAL at 21:26

## 2021-05-07 RX ADMIN — SENNOSIDES AND DOCUSATE SODIUM 2 TABLET: 8.6; 5 TABLET ORAL at 17:40

## 2021-05-07 RX ADMIN — FLECAINIDE ACETATE 150 MG: 100 TABLET ORAL at 09:38

## 2021-05-07 RX ADMIN — ASPIRIN 81 MG: 81 TABLET ORAL at 09:38

## 2021-05-07 RX ADMIN — SENNOSIDES AND DOCUSATE SODIUM 2 TABLET: 8.6; 5 TABLET ORAL at 09:38

## 2021-05-07 RX ADMIN — Medication 10 ML: at 21:26

## 2021-05-07 RX ADMIN — INSULIN HUMAN 3 UNITS: 100 INJECTION, SOLUTION PARENTERAL at 12:17

## 2021-05-07 RX ADMIN — ENOXAPARIN SODIUM 40 MG: 40 INJECTION SUBCUTANEOUS at 09:37

## 2021-05-07 RX ADMIN — PANTOPRAZOLE SODIUM 40 MG: 40 INJECTION, POWDER, FOR SOLUTION INTRAVENOUS at 09:39

## 2021-05-07 RX ADMIN — SERTRALINE HYDROCHLORIDE 50 MG: 50 TABLET ORAL at 09:38

## 2021-05-07 RX ADMIN — SODIUM CHLORIDE, SODIUM LACTATE, POTASSIUM CHLORIDE, AND CALCIUM CHLORIDE 100 ML/HR: 600; 310; 30; 20 INJECTION, SOLUTION INTRAVENOUS at 00:01

## 2021-05-07 RX ADMIN — ACETAMINOPHEN 1000 MG: 500 TABLET ORAL at 14:18

## 2021-05-07 RX ADMIN — GABAPENTIN 300 MG: 300 CAPSULE ORAL at 09:38

## 2021-05-07 RX ADMIN — ATENOLOL 25 MG: 25 TABLET ORAL at 09:38

## 2021-05-07 RX ADMIN — Medication 10 ML: at 05:27

## 2021-05-07 RX ADMIN — ACETAMINOPHEN 1000 MG: 500 TABLET ORAL at 21:26

## 2021-05-07 RX ADMIN — GABAPENTIN 300 MG: 300 CAPSULE ORAL at 16:21

## 2021-05-07 RX ADMIN — INSULIN HUMAN 3 UNITS: 100 INJECTION, SOLUTION PARENTERAL at 17:40

## 2021-05-07 RX ADMIN — ACETAMINOPHEN 1000 MG: 500 TABLET, FILM COATED ORAL at 05:25

## 2021-05-07 RX ADMIN — GABAPENTIN 300 MG: 300 CAPSULE ORAL at 21:26

## 2021-05-07 NOTE — PROGRESS NOTES
CM met with patient to discuss discharge planning. Per therapy eval this day, HH therapy and bedside commode/ Rolling Noralyn Cong has been recommended. RASHAD AMAYA is assisting with DME, from [facility]. Patient is agreeable to [facility] services. CM provided [facility] agency list. CM will alert weekend CM to follow up regarding DME Order and [facility] agency, prior to discharge. CM remains available.

## 2021-05-07 NOTE — PROGRESS NOTES
ACUTE PHYSICAL THERAPY GOALS:  (Developed with and agreed upon by patient and/or caregiver.)  (1.) Chino Fulton will move from supine to sit and sit to supine , scoot up and down and roll side to side with MODIFIED INDEPENDENCE within 7 treatment day(s). (2.) Chino Fulton will transfer from bed to chair and chair to bed with MODIFIED INDEPENDENCE using the least restrictive device within 7 treatment day(s). (3.) Chino Fulton will ambulate with MODIFIED INDEPENDENCE for 300 feet with the least restrictive device within 7 treatment day(s). (4.) Chino Fulton will perform standing static and dynamic balance activities x 25 minutes with MODIFIED INDEPENDENCE to improve safety within 7 treatment day(s). (5.) Chino Fulton will perform therapeutic exercises x 20 min for HEP with INDEPENDENCE to improve strength, endurance, and functional mobility within 7 treatment day(s). PHYSICAL THERAPY: Daily Note Treatment Day # 2    Chino Fulton is a 77 y.o. female   PRIMARY DIAGNOSIS: IPMN (intraductal papillary mucinous neoplasm)  IPMN (intraductal papillary mucinous neoplasm) [D49.0]  Procedure(s) (LRB):  DISTAL PANCREATECTOMY AND SPLENECTOMY LAPAROSCOPIC, CONVERTED TO OPEN; CHOLECYSTECTOMY; EXTENSIVE VASCULAR DISSECTION;  FALCIFORM LIGAMENT FLAP; INTRAOPERATIVE US (N/A)  2 Days Post-Op    ASSESSMENT:     REHAB RECOMMENDATIONS: CURRENT LEVEL OF FUNCTION:  (Most Recently Demonstrated)   Recommendation to date pending progress:  [address] [place] [address] Therapy  Equipment:    3 in 1 Bedside Commode Bed Mobility:   Minimal Assistance  Sit to Stand:   Minimal Assistance  Transfers:   Contact Guard Assistance  Gait/Mobility:   Contact Guard Assistance     ASSESSMENT:  Ms. Missael Villalpando is making good progress toward goals with increased gait distances and decreased assistance levels. Continues to require assist with bed mobility and cueing for log roll technique. SUBJECTIVE:   Ms. Missael Villalpando states, \"So, does the food go straight into my pancreas? .\"    SOCIAL HISTORY/ LIVING ENVIRONMENT: Lives with her  in a one level home with flat entry. No falls, no DME use prior.      OBJECTIVE:     PAIN: VITAL SIGNS: LINES/DRAINS:   Pre Treatment: Pain Screen  Pain Scale 1: Numeric (0 - 10)  Pain Intensity 1: 0  Post Treatment: 0      O2 Device: Nasal cannula     MOBILITY: I Mod I S SBA CGA Min Mod Max Total  NT x2 Comments:   Bed Mobility    Rolling [] [] [] [] [] [x] [] [] [] [] []    Supine to Sit [] [] [] [] [] [x] [] [] [] [] []    Scooting [] [] [] [] [x] [] [] [] [] [] []    Sit to Supine [] [] [] [] [] [] [] [] [] [x] []    Transfers    Sit to Stand [] [] [] [] [x] [] [] [] [] [] []    Bed to Chair [] [] [] [] [x] [] [] [] [] [] []    Stand to Sit [] [] [] [] [x] [] [] [] [] [] []    I=Independent, Mod I=Modified Independent, S=Supervision, SBA=Standby Assistance, CGA=Contact Guard Assistance,   Min=Minimal Assistance, Mod=Moderate Assistance, Max=Maximal Assistance, Total=Total Assistance, NT=Not Tested    BALANCE: Good Fair+ Fair Fair- Poor NT Comments   Sitting Static [x] [] [] [] [] []    Sitting Dynamic [x] [] [] [] [] []              Standing Static [x] [x] [] [] [] []    Standing Dynamic [] [x] [] [] [] []      GAIT: I Mod I S SBA CGA Min Mod Max Total  NT x2 Comments:   Level of Assistance [] [] [] [x] [x] [] [] [] [] [] []    Distance 250'    DME Rolling Walker    Gait Quality Steady pace, occasional \"spasms\"     Weightbearing  Status N/A     I=Independent, Mod I=Modified Independent, S=Supervision, SBA=Standby Assistance, CGA=Contact Guard Assistance,   Min=Minimal Assistance, Mod=Moderate Assistance, Max=Maximal Assistance, Total=Total Assistance, NT=Not Tested    PLAN:   FREQUENCY/DURATION: PT Plan of Care: 3 times/week for duration of hospital stay or until stated goals are met, whichever comes first.  TREATMENT:     TREATMENT:   ($$ Therapeutic Activity: 8-22 mins  $$ Therapeutic Exercises: 8-22 mins    )  Therapeutic Activity (17 Minutes): Therapeutic activity included Rolling, Supine to Sit, Scooting, Transfer Training, Ambulation on level ground, Sitting balance  and Standing balance to improve functional Mobility, Strength and Activity tolerance. Therapeutic Exercise (10 Minutes): Therapeutic exercises noted below to improve functional activity tolerance.      TREATMENT GRID:   Date:  5/7 Date:   Date:     Activity/Exercise Parameters Parameters Parameters   Ankle pumps x20     marching x15     LAQ x15                                   AFTER TREATMENT POSITION/PRECAUTIONS:  Chair and Needs within reach    INTERDISCIPLINARY COLLABORATION:  RN/PCT and PT/PTA    TOTAL TREATMENT DURATION:  PT Patient Time In/Time Out  Time In: 1418  Time Out: [address], PTA

## 2021-05-07 NOTE — PROGRESS NOTES
5/7/2021    PLAN:  Continue present care  DIET CLEAR LIQUID  IVFs  SCD/IS/Protonix for prophylactic measures  SCD's or Sequential Compression Device and Lovenox  OOB and ambulate  Pain/nausea control  Monitor labs  Will need post splenectomy vaccines  PTA medication added  Wean O2          ASSESSMENT:  Admit Date: 5/5/2021    Procedure(s):  DISTAL PANCREATECTOMY AND SPLENECTOMY LAPAROSCOPIC, CONVERTED TO OPEN; CHOLECYSTECTOMY; EXTENSIVE VASCULAR DISSECTION;  FALCIFORM LIGAMENT FLAP; INTRAOPERATIVE US      SUBJECTIVE:  05/06/21 1 Day Post-Op awake in bed, no complaints. Pain controlled. -flatus or BM. Denies nausea or vomiting. 05/07/21 2 Day Post-Op awake in bed, no complaints. Pain controlled. -flatus or BM. Denies nausea or vomiting. OBJECTIVE:  Patient Vitals for the past 24 hrs:   Temp Pulse Resp BP SpO2   05/07/21 1211 98.1 °F (36.7 °C) 62 15 (!) 111/40 96 %   05/07/21 0742 98.6 °F (37 °C) 64 15 (!) 153/68 95 %   05/07/21 0439 98.3 °F (36.8 °C) 62 16 (!) 161/65 95 %   05/07/21 0020 98.1 °F (36.7 °C) (!) 55 16 (!) 152/66 97 %   05/06/21 2020 98.3 °F (36.8 °C) 61 16 (!) 146/72 96 %   05/06/21 1538 98.4 °F (36.9 °C) (!) 56 16 (!) 135/58 94 %   05/06/21 1503 -- -- -- -- 93 %         Date 05/06/21 0700 - 05/07/21 0659 05/07/21 0700 - 05/08/21 0659   Shift 1613-8780 1391-0797 24 Hour Total 3873-1175 8790-2933 24 Hour Total   INTAKE   P.O. 600  600 240  240     P. O. 600  600 240  240   Shift Total(mL/kg) 600(9)  600(9) 240(3.6)  240(3.6)   OUTPUT   Urine(mL/kg/hr)  550(0.7) 550(0.3)        Urine Voided  550 550        Urine Occurrence(s)  1 x 1 x 1 x  1 x   Drains  85 85 110  110     Output (ml) (Shaq Drain #1 05/05/21 Left Abdomen)  85 85 110  110   Stool           Stool Occurrence(s)  0 x 0 x      Shift Total(mL/kg)  635(9.5) 635(9.5) 110(1.7)  110(1.7)    -635 -40 130  130   Weight (kg) 66.5 66.5 66.5 66.5 66.5 66.5            General: Sleepy, No acute distress    Lungs:             Even and unlabored   Heart:              RRR  Abdomen:        Soft, Non distended, appropriately tender. Dressing removed. [place] C/D/I.  Lavonne Luca with serosanguineous drainage    Extremities:     No cyanosis, clubbing or edema  Neurologic:      No focal deficits           Labs:    Recent Labs     05/07/21  0538   WBC 23.9*   HGB 11.3*         K 3.9      CO2 32   BUN 15   CREA 0.69   *         Shazia Kim, CYNTHIA

## 2021-05-08 LAB
ANION GAP SERPL CALC-SCNC: 4 MMOL/L (ref 7–16)
BASOPHILS # BLD: 0.1 K/UL (ref 0–0.2)
BASOPHILS NFR BLD: 0 % (ref 0–2)
BUN SERPL-MCNC: 10 MG/DL (ref 8–23)
CALCIUM SERPL-MCNC: 8.3 MG/DL (ref 8.3–10.4)
CHLORIDE SERPL-SCNC: 105 MMOL/L (ref 98–107)
CO2 SERPL-SCNC: 33 MMOL/L (ref 21–32)
CREAT SERPL-MCNC: 0.58 MG/DL (ref 0.6–1)
DIFFERENTIAL METHOD BLD: ABNORMAL
EOSINOPHIL # BLD: 0.1 K/UL (ref 0–0.8)
EOSINOPHIL NFR BLD: 1 % (ref 0.5–7.8)
ERYTHROCYTE [DISTWIDTH] IN BLOOD BY AUTOMATED COUNT: 13 % (ref 11.9–14.6)
GLUCOSE BLD STRIP.AUTO-MCNC: 142 MG/DL (ref 65–100)
GLUCOSE BLD STRIP.AUTO-MCNC: 150 MG/DL (ref 65–100)
GLUCOSE BLD STRIP.AUTO-MCNC: 164 MG/DL (ref 65–100)
GLUCOSE SERPL-MCNC: 152 MG/DL (ref 65–100)
HCT VFR BLD AUTO: 36.6 % (ref 35.8–46.3)
HGB BLD-MCNC: 11.6 G/DL (ref 11.7–15.4)
IMM GRANULOCYTES # BLD AUTO: 0.1 K/UL (ref 0–0.5)
IMM GRANULOCYTES NFR BLD AUTO: 1 % (ref 0–5)
LYMPHOCYTES # BLD: 1.9 K/UL (ref 0.5–4.6)
LYMPHOCYTES NFR BLD: 10 % (ref 13–44)
MCH RBC QN AUTO: 29.6 PG (ref 26.1–32.9)
MCHC RBC AUTO-ENTMCNC: 31.7 G/DL (ref 31.4–35)
MCV RBC AUTO: 93.4 FL (ref 79.6–97.8)
MONOCYTES # BLD: 2.6 K/UL (ref 0.1–1.3)
MONOCYTES NFR BLD: 13 % (ref 4–12)
NEUTS SEG # BLD: 14.9 K/UL (ref 1.7–8.2)
NEUTS SEG NFR BLD: 76 % (ref 43–78)
NRBC # BLD: 0 K/UL (ref 0–0.2)
PLATELET # BLD AUTO: 201 K/UL (ref 150–450)
PMV BLD AUTO: 11.6 FL (ref 9.4–12.3)
POTASSIUM SERPL-SCNC: 3.5 MMOL/L (ref 3.5–5.1)
RBC # BLD AUTO: 3.92 M/UL (ref 4.05–5.2)
SERVICE CMNT-IMP: ABNORMAL
SODIUM SERPL-SCNC: 142 MMOL/L (ref 136–145)
WBC # BLD AUTO: 19.7 K/UL (ref 4.3–11.1)

## 2021-05-08 PROCEDURE — 80048 BASIC METABOLIC PNL TOTAL CA: CPT

## 2021-05-08 PROCEDURE — 74011000250 HC RX REV CODE- 250: Performed by: NURSE PRACTITIONER

## 2021-05-08 PROCEDURE — 36415 COLL VENOUS BLD VENIPUNCTURE: CPT

## 2021-05-08 PROCEDURE — 85025 COMPLETE CBC W/AUTO DIFF WBC: CPT

## 2021-05-08 PROCEDURE — 74011250637 HC RX REV CODE- 250/637: Performed by: NURSE PRACTITIONER

## 2021-05-08 PROCEDURE — 74011636637 HC RX REV CODE- 636/637: Performed by: NURSE PRACTITIONER

## 2021-05-08 PROCEDURE — 65270000029 HC RM PRIVATE

## 2021-05-08 PROCEDURE — 74011250636 HC RX REV CODE- 250/636: Performed by: NURSE PRACTITIONER

## 2021-05-08 PROCEDURE — 2709999900 HC NON-CHARGEABLE SUPPLY

## 2021-05-08 PROCEDURE — C9113 INJ PANTOPRAZOLE SODIUM, VIA: HCPCS | Performed by: NURSE PRACTITIONER

## 2021-05-08 PROCEDURE — 94760 N-INVAS EAR/PLS OXIMETRY 1: CPT

## 2021-05-08 PROCEDURE — 74011250637 HC RX REV CODE- 250/637: Performed by: SURGERY

## 2021-05-08 PROCEDURE — 74011250636 HC RX REV CODE- 250/636: Performed by: SURGERY

## 2021-05-08 PROCEDURE — 82962 GLUCOSE BLOOD TEST: CPT

## 2021-05-08 RX ADMIN — SODIUM CHLORIDE, SODIUM LACTATE, POTASSIUM CHLORIDE, AND CALCIUM CHLORIDE 25 ML/HR: 600; 310; 30; 20 INJECTION, SOLUTION INTRAVENOUS at 21:32

## 2021-05-08 RX ADMIN — PANTOPRAZOLE SODIUM 40 MG: 40 INJECTION, POWDER, FOR SOLUTION INTRAVENOUS at 08:46

## 2021-05-08 RX ADMIN — PRAVASTATIN SODIUM 10 MG: 20 TABLET ORAL at 21:31

## 2021-05-08 RX ADMIN — ACETAMINOPHEN 1000 MG: 500 TABLET ORAL at 21:31

## 2021-05-08 RX ADMIN — GABAPENTIN 300 MG: 300 CAPSULE ORAL at 15:47

## 2021-05-08 RX ADMIN — SENNOSIDES AND DOCUSATE SODIUM 2 TABLET: 8.6; 5 TABLET ORAL at 08:46

## 2021-05-08 RX ADMIN — Medication 10 ML: at 04:38

## 2021-05-08 RX ADMIN — Medication 10 ML: at 13:29

## 2021-05-08 RX ADMIN — INSULIN HUMAN 3 UNITS: 100 INJECTION, SOLUTION PARENTERAL at 11:46

## 2021-05-08 RX ADMIN — ENOXAPARIN SODIUM 40 MG: 40 INJECTION SUBCUTANEOUS at 08:45

## 2021-05-08 RX ADMIN — ATENOLOL 25 MG: 25 TABLET ORAL at 08:45

## 2021-05-08 RX ADMIN — ACETAMINOPHEN 1000 MG: 500 TABLET ORAL at 13:29

## 2021-05-08 RX ADMIN — GABAPENTIN 300 MG: 300 CAPSULE ORAL at 21:31

## 2021-05-08 RX ADMIN — AMLODIPINE BESYLATE 5 MG: 5 TABLET ORAL at 08:46

## 2021-05-08 RX ADMIN — GABAPENTIN 300 MG: 300 CAPSULE ORAL at 08:46

## 2021-05-08 RX ADMIN — ASPIRIN 81 MG: 81 TABLET ORAL at 08:46

## 2021-05-08 RX ADMIN — Medication 10 ML: at 21:31

## 2021-05-08 RX ADMIN — ACETAMINOPHEN 1000 MG: 500 TABLET ORAL at 05:05

## 2021-05-08 RX ADMIN — FLECAINIDE ACETATE 150 MG: 100 TABLET ORAL at 08:46

## 2021-05-08 RX ADMIN — SERTRALINE HYDROCHLORIDE 50 MG: 50 TABLET ORAL at 08:46

## 2021-05-08 NOTE — PROGRESS NOTES
Hourly rounds performed this shift. Pt had multiple loose BM throughout shift. Patient denies needs at this time. Bed in low position and locked. The call light and personal items are in reach. Will continue to monitor and report to oncoming nurse.

## 2021-05-08 NOTE — PROGRESS NOTES
5/8/2021    PLAN:  Continue present care  DIET FULL LIQUID  IVFs  SCD/IS/Protonix for prophylactic measures  SCD's or Sequential Compression Device and Lovenox  OOB and ambulate  Pain/nausea control  Monitor labs  Will need post splenectomy vaccines  PTA medication added  MAX drain - will remove prior to DC          ASSESSMENT:  Admit Date: 5/5/2021    Procedure(s):  DISTAL PANCREATECTOMY AND SPLENECTOMY LAPAROSCOPIC, CONVERTED TO OPEN; CHOLECYSTECTOMY; EXTENSIVE VASCULAR DISSECTION;  FALCIFORM LIGAMENT FLAP; INTRAOPERATIVE US      SUBJECTIVE:  05/06/21 1 Day Post-Op awake in bed, no complaints. Pain controlled. -flatus or BM. Denies nausea or vomiting. 05/07/21 2 Day Post-Op awake in bed, no complaints. Pain controlled. -flatus or BM. Denies nausea or vomiting. 05/08/21 3 Day Post-Op awake in bed, no complaints. Pain controlled. +flatus(small amount) -BM. Denies nausea or vomiting. On room air. AF, NAD. OBJECTIVE:  Patient Vitals for the past 24 hrs:   Temp Pulse Resp BP SpO2   05/08/21 1132 98.6 °F (37 °C) 61 14 138/61 95 %   05/08/21 0830 98.4 °F (36.9 °C) 71 14 (!) 160/62 92 %   05/08/21 0502 97.8 °F (36.6 °C) 67 16 (!) 160/69 91 %   05/07/21 2351 97.9 °F (36.6 °C) 61 16 (!) 162/67 93 %   05/07/21 2021 98 °F (36.7 °C) 64 16 136/67 96 %   05/07/21 1633 97.9 °F (36.6 °C) (!) 59 16 (!) 125/53 91 %         Date 05/07/21 0700 - 05/08/21 0659 05/08/21 0700 - 05/09/21 0659   Shift 6984-7748 9308-7442 24 Hour Total 9401-4015 7665-4676 24 Hour Total   INTAKE   P.O. 240  240        P. O. 240  240      Shift Total(mL/kg) 240(3.6)  240(3.6)      OUTPUT   Urine(mL/kg/hr)           Urine Occurrence(s) 1 x 2 x 3 x      Drains 205 120 325        Output (ml) (Shaq Drain #1 05/05/21 Left Abdomen) 205 120 325      Stool           Stool Occurrence(s)  0 x 0 x 1 x  1 x   Shift Total(mL/kg) 205(3.1) 120(1.8) 325(4.9)      NET 35 -120 -85      Weight (kg) 66.5 66.5 66.5 66.5 66.5 66.5            General:          Alert, No acute distress    Lungs:             Even and unlabored   Heart:              RRR  Abdomen:        Soft, Non distended, appropriately tender. Dressing removed. [place] C/D/I.  Briones Riana with serosanguineous drainage    Extremities:     No cyanosis, clubbing or edema  Neurologic:      No focal deficits           Labs:    Recent Labs     05/08/21  0624   WBC 19.7*   HGB 11.6*         K 3.5      CO2 33*   BUN 10   CREA 0.58*   *         Trent Gallardo, CYNTHIA

## 2021-05-09 LAB
ANION GAP SERPL CALC-SCNC: 4 MMOL/L (ref 7–16)
BASOPHILS # BLD: 0.1 K/UL (ref 0–0.2)
BASOPHILS NFR BLD: 1 % (ref 0–2)
BUN SERPL-MCNC: 11 MG/DL (ref 8–23)
CALCIUM SERPL-MCNC: 8.4 MG/DL (ref 8.3–10.4)
CHLORIDE SERPL-SCNC: 107 MMOL/L (ref 98–107)
CO2 SERPL-SCNC: 32 MMOL/L (ref 21–32)
CREAT SERPL-MCNC: 0.6 MG/DL (ref 0.6–1)
DIFFERENTIAL METHOD BLD: ABNORMAL
EOSINOPHIL # BLD: 0.3 K/UL (ref 0–0.8)
EOSINOPHIL NFR BLD: 2 % (ref 0.5–7.8)
ERYTHROCYTE [DISTWIDTH] IN BLOOD BY AUTOMATED COUNT: 12.9 % (ref 11.9–14.6)
GLUCOSE BLD STRIP.AUTO-MCNC: 135 MG/DL (ref 65–100)
GLUCOSE BLD STRIP.AUTO-MCNC: 139 MG/DL (ref 65–100)
GLUCOSE BLD STRIP.AUTO-MCNC: 186 MG/DL (ref 65–100)
GLUCOSE BLD STRIP.AUTO-MCNC: 191 MG/DL (ref 65–100)
GLUCOSE SERPL-MCNC: 140 MG/DL (ref 65–100)
HCT VFR BLD AUTO: 38.3 % (ref 35.8–46.3)
HGB BLD-MCNC: 12.2 G/DL (ref 11.7–15.4)
IMM GRANULOCYTES # BLD AUTO: 0.1 K/UL (ref 0–0.5)
IMM GRANULOCYTES NFR BLD AUTO: 1 % (ref 0–5)
LYMPHOCYTES # BLD: 2.1 K/UL (ref 0.5–4.6)
LYMPHOCYTES NFR BLD: 14 % (ref 13–44)
MCH RBC QN AUTO: 29.1 PG (ref 26.1–32.9)
MCHC RBC AUTO-ENTMCNC: 31.9 G/DL (ref 31.4–35)
MCV RBC AUTO: 91.4 FL (ref 79.6–97.8)
MONOCYTES # BLD: 1.9 K/UL (ref 0.1–1.3)
MONOCYTES NFR BLD: 12 % (ref 4–12)
NEUTS SEG # BLD: 11 K/UL (ref 1.7–8.2)
NEUTS SEG NFR BLD: 71 % (ref 43–78)
NRBC # BLD: 0 K/UL (ref 0–0.2)
PLATELET # BLD AUTO: 255 K/UL (ref 150–450)
PMV BLD AUTO: 11.7 FL (ref 9.4–12.3)
POTASSIUM SERPL-SCNC: 3.8 MMOL/L (ref 3.5–5.1)
RBC # BLD AUTO: 4.19 M/UL (ref 4.05–5.2)
SERVICE CMNT-IMP: ABNORMAL
SODIUM SERPL-SCNC: 143 MMOL/L (ref 136–145)
WBC # BLD AUTO: 15.5 K/UL (ref 4.3–11.1)

## 2021-05-09 PROCEDURE — 80048 BASIC METABOLIC PNL TOTAL CA: CPT

## 2021-05-09 PROCEDURE — 74011250637 HC RX REV CODE- 250/637: Performed by: NURSE PRACTITIONER

## 2021-05-09 PROCEDURE — 85025 COMPLETE CBC W/AUTO DIFF WBC: CPT

## 2021-05-09 PROCEDURE — 65270000029 HC RM PRIVATE

## 2021-05-09 PROCEDURE — 74011000250 HC RX REV CODE- 250: Performed by: NURSE PRACTITIONER

## 2021-05-09 PROCEDURE — 74011250636 HC RX REV CODE- 250/636: Performed by: NURSE PRACTITIONER

## 2021-05-09 PROCEDURE — 74011250637 HC RX REV CODE- 250/637: Performed by: SURGERY

## 2021-05-09 PROCEDURE — C9113 INJ PANTOPRAZOLE SODIUM, VIA: HCPCS | Performed by: NURSE PRACTITIONER

## 2021-05-09 PROCEDURE — 36415 COLL VENOUS BLD VENIPUNCTURE: CPT

## 2021-05-09 PROCEDURE — 82962 GLUCOSE BLOOD TEST: CPT

## 2021-05-09 PROCEDURE — 74011636637 HC RX REV CODE- 636/637: Performed by: NURSE PRACTITIONER

## 2021-05-09 RX ORDER — NYSTATIN 100000 [USP'U]/ML
500000 SUSPENSION ORAL 4 TIMES DAILY
Status: DISCONTINUED | OUTPATIENT
Start: 2021-05-09 | End: 2021-05-10 | Stop reason: HOSPADM

## 2021-05-09 RX ADMIN — GABAPENTIN 300 MG: 300 CAPSULE ORAL at 17:34

## 2021-05-09 RX ADMIN — ATENOLOL 25 MG: 25 TABLET ORAL at 09:39

## 2021-05-09 RX ADMIN — INSULIN HUMAN 3 UNITS: 100 INJECTION, SOLUTION PARENTERAL at 11:42

## 2021-05-09 RX ADMIN — ENOXAPARIN SODIUM 40 MG: 40 INJECTION SUBCUTANEOUS at 09:38

## 2021-05-09 RX ADMIN — ACETAMINOPHEN 1000 MG: 500 TABLET ORAL at 05:20

## 2021-05-09 RX ADMIN — Medication 10 ML: at 13:54

## 2021-05-09 RX ADMIN — GABAPENTIN 300 MG: 300 CAPSULE ORAL at 09:40

## 2021-05-09 RX ADMIN — Medication 10 ML: at 21:18

## 2021-05-09 RX ADMIN — AMLODIPINE BESYLATE 5 MG: 5 TABLET ORAL at 09:40

## 2021-05-09 RX ADMIN — ACETAMINOPHEN 1000 MG: 500 TABLET ORAL at 21:17

## 2021-05-09 RX ADMIN — FLECAINIDE ACETATE 150 MG: 100 TABLET ORAL at 09:38

## 2021-05-09 RX ADMIN — PANTOPRAZOLE SODIUM 40 MG: 40 INJECTION, POWDER, FOR SOLUTION INTRAVENOUS at 09:40

## 2021-05-09 RX ADMIN — ACETAMINOPHEN 1000 MG: 500 TABLET ORAL at 13:53

## 2021-05-09 RX ADMIN — PRAVASTATIN SODIUM 10 MG: 20 TABLET ORAL at 21:17

## 2021-05-09 RX ADMIN — ASPIRIN 81 MG: 81 TABLET ORAL at 09:39

## 2021-05-09 RX ADMIN — SERTRALINE HYDROCHLORIDE 50 MG: 50 TABLET ORAL at 09:40

## 2021-05-09 RX ADMIN — Medication 10 ML: at 05:20

## 2021-05-09 RX ADMIN — NYSTATIN 500000 UNITS: 100000 SUSPENSION ORAL at 21:17

## 2021-05-09 RX ADMIN — INSULIN HUMAN 3 UNITS: 100 INJECTION, SOLUTION PARENTERAL at 17:55

## 2021-05-09 RX ADMIN — GABAPENTIN 300 MG: 300 CAPSULE ORAL at 21:17

## 2021-05-09 NOTE — PROGRESS NOTES
Problem: Falls - Risk of  Goal: *Absence of Falls  Description: Document Yue Hussein Fall Risk and appropriate interventions in the flowsheet. Outcome: Progressing Towards Goal  Note: Fall Risk Interventions:  Mobility Interventions: Patient to call before getting OOB         Medication Interventions: Teach patient to arise slowly    Elimination Interventions: Call light in reach              Problem: Patient Education: Go to Patient Education Activity  Goal: Patient/Family Education  Outcome: Progressing Towards Goal     Problem: Pain  Goal: *Control of Pain  Outcome: Progressing Towards Goal     Problem: Patient Education: Go to Patient Education Activity  Goal: Patient/Family Education  Outcome: Progressing Towards Goal     Pt in no distress, C/O pain in L-knee and surgical sites, pain med offered, pt declined and wants to wait, no C/O N/V/diarrhea, tolerating meds/diet, POC/safety reviewed, pt and  who is at bedside comprehends POC/safety, IV intact, oxygen assessed (pt on room air), MAX drain intact, incision sites intact.

## 2021-05-09 NOTE — PROGRESS NOTES
5/9/2021    PLAN:  Continue present care  Soft Diet  SCD/IS/Protonix for prophylactic measures  SCD's or Sequential Compression Device and Lovenox  OOB and ambulate  Pain/nausea control  Monitor labs  Will need post splenectomy vaccines  PTA medication added  MAX drain   Likely DC home in am          ASSESSMENT:  Admit Date: 5/5/2021    Procedure(s):  DISTAL PANCREATECTOMY AND SPLENECTOMY LAPAROSCOPIC, CONVERTED TO OPEN; CHOLECYSTECTOMY; EXTENSIVE VASCULAR DISSECTION;  FALCIFORM LIGAMENT FLAP; INTRAOPERATIVE US      SUBJECTIVE:  05/06/21 1 Day Post-Op awake in bed, no complaints. Pain controlled. -flatus or BM. Denies nausea or vomiting. 05/07/21 2 Day Post-Op awake in bed, no complaints. Pain controlled. -flatus or BM. Denies nausea or vomiting. 05/08/21 3 Day Post-Op awake in bed, no complaints. Pain controlled. +flatus(small amount) -BM. Denies nausea or vomiting. On room air. AF, NAD.     05/09/21 4 Day Post-Op awake in bed, no complaints. Pain controlled. +flatus/+BM. Denies nausea or vomiting. On room air. MAX drain bulb full. AF, NAD. OBJECTIVE:  Patient Vitals for the past 24 hrs:   Temp Pulse Resp BP SpO2   05/09/21 1041 99.3 °F (37.4 °C) 66 18 (!) 146/61 92 %   05/09/21 0706 98.5 °F (36.9 °C) 63 18 (!) 162/71 92 %   05/09/21 0309 97.9 °F (36.6 °C) 60 16 (!) 154/71 92 %   05/08/21 2340 98.4 °F (36.9 °C) 62 18 (!) 155/65 93 %   05/08/21 2131 -- -- -- -- 94 %   05/08/21 1925 98.4 °F (36.9 °C) 63 16 (!) 144/63 94 %   05/08/21 1615 98.2 °F (36.8 °C) (!) 57 16 (!) 148/55 94 %         Date 05/08/21 0700 - 05/09/21 0659 05/09/21 0700 - 05/10/21 0659   Shift 4848-6288 6125-1449 24 Hour Total 7999-9660 0532-3796 24 Hour Total   INTAKE   P.O.    717  717     P. O.    717  717   Shift Total(mL/kg)    717(10.8)  717(10.8)   OUTPUT   Urine(mL/kg/hr)           Urine Occurrence(s) 1 x 2 x 3 x 2 x  2 x   Drains 45 50 95 100  100     Output (ml) Min Gallegos Drain #1 05/05/21 Left Abdomen) 45 50 95 100  100   Stool           Stool Occurrence(s) 3 x 1 x 4 x      Shift Total(mL/kg) 45(0.7) 50(0.8) 95(1.4) 100(1.5)  100(1.5)   NET -45 -50 -95 400 335   Weight (kg) 66.5 66.5 66.5 66.5 66.5 66.5            General:          Alert, No acute distress    Lungs:             Even and unlabored   Heart:              RRR  Abdomen:        Soft, Non distended, appropriately tender. [place] C/D/I.  Ravi Pila with serosanguineous drainage    Extremities:     No cyanosis, clubbing or edema  Neurologic:      No focal deficits           Labs:    Recent Labs     05/09/21  0704   WBC 15.5*   HGB 12.2         K 3.8      CO2 32   BUN 11   CREA 0.60   *         Glen Liz, NP

## 2021-05-10 VITALS
WEIGHT: 146.6 LBS | TEMPERATURE: 98 F | DIASTOLIC BLOOD PRESSURE: 78 MMHG | SYSTOLIC BLOOD PRESSURE: 157 MMHG | HEIGHT: 62 IN | RESPIRATION RATE: 16 BRPM | HEART RATE: 66 BPM | OXYGEN SATURATION: 92 % | BODY MASS INDEX: 26.98 KG/M2

## 2021-05-10 LAB
ANION GAP SERPL CALC-SCNC: 4 MMOL/L (ref 7–16)
BASOPHILS # BLD: 0.1 K/UL (ref 0–0.2)
BASOPHILS NFR BLD: 1 % (ref 0–2)
BUN SERPL-MCNC: 9 MG/DL (ref 8–23)
CALCIUM SERPL-MCNC: 8.7 MG/DL (ref 8.3–10.4)
CHLORIDE SERPL-SCNC: 107 MMOL/L (ref 98–107)
CO2 SERPL-SCNC: 33 MMOL/L (ref 21–32)
CREAT SERPL-MCNC: 0.66 MG/DL (ref 0.6–1)
DIFFERENTIAL METHOD BLD: ABNORMAL
EOSINOPHIL # BLD: 0.4 K/UL (ref 0–0.8)
EOSINOPHIL NFR BLD: 3 % (ref 0.5–7.8)
ERYTHROCYTE [DISTWIDTH] IN BLOOD BY AUTOMATED COUNT: 12.8 % (ref 11.9–14.6)
GLUCOSE BLD STRIP.AUTO-MCNC: 126 MG/DL (ref 65–100)
GLUCOSE BLD STRIP.AUTO-MCNC: 146 MG/DL (ref 65–100)
GLUCOSE SERPL-MCNC: 154 MG/DL (ref 65–100)
HCT VFR BLD AUTO: 37.4 % (ref 35.8–46.3)
HGB BLD-MCNC: 12.3 G/DL (ref 11.7–15.4)
IMM GRANULOCYTES # BLD AUTO: 0.1 K/UL (ref 0–0.5)
IMM GRANULOCYTES NFR BLD AUTO: 1 % (ref 0–5)
LYMPHOCYTES # BLD: 2 K/UL (ref 0.5–4.6)
LYMPHOCYTES NFR BLD: 14 % (ref 13–44)
MCH RBC QN AUTO: 29.8 PG (ref 26.1–32.9)
MCHC RBC AUTO-ENTMCNC: 32.9 G/DL (ref 31.4–35)
MCV RBC AUTO: 90.6 FL (ref 79.6–97.8)
MONOCYTES # BLD: 2 K/UL (ref 0.1–1.3)
MONOCYTES NFR BLD: 14 % (ref 4–12)
NEUTS SEG # BLD: 10.2 K/UL (ref 1.7–8.2)
NEUTS SEG NFR BLD: 69 % (ref 43–78)
NRBC # BLD: 0.02 K/UL (ref 0–0.2)
PLATELET # BLD AUTO: 295 K/UL (ref 150–450)
PMV BLD AUTO: 11.4 FL (ref 9.4–12.3)
POTASSIUM SERPL-SCNC: 3.7 MMOL/L (ref 3.5–5.1)
RBC # BLD AUTO: 4.13 M/UL (ref 4.05–5.2)
SERVICE CMNT-IMP: ABNORMAL
SERVICE CMNT-IMP: ABNORMAL
SODIUM SERPL-SCNC: 144 MMOL/L (ref 136–145)
WBC # BLD AUTO: 14.8 K/UL (ref 4.3–11.1)

## 2021-05-10 PROCEDURE — 80048 BASIC METABOLIC PNL TOTAL CA: CPT

## 2021-05-10 PROCEDURE — 74011250637 HC RX REV CODE- 250/637: Performed by: SURGERY

## 2021-05-10 PROCEDURE — 74011250637 HC RX REV CODE- 250/637: Performed by: NURSE PRACTITIONER

## 2021-05-10 PROCEDURE — 74011250636 HC RX REV CODE- 250/636: Performed by: NURSE PRACTITIONER

## 2021-05-10 PROCEDURE — 82962 GLUCOSE BLOOD TEST: CPT

## 2021-05-10 PROCEDURE — 85025 COMPLETE CBC W/AUTO DIFF WBC: CPT

## 2021-05-10 PROCEDURE — 36415 COLL VENOUS BLD VENIPUNCTURE: CPT

## 2021-05-10 RX ORDER — HYDROCODONE BITARTRATE AND ACETAMINOPHEN 5; 325 MG/1; MG/1
1 TABLET ORAL
Qty: 20 TAB | Refills: 0 | Status: SHIPPED | OUTPATIENT
Start: 2021-05-10 | End: 2021-05-15

## 2021-05-10 RX ORDER — ENOXAPARIN SODIUM 100 MG/ML
40 INJECTION SUBCUTANEOUS DAILY
Qty: 8.4 ML | Refills: 0 | Status: SHIPPED | OUTPATIENT
Start: 2021-05-10 | End: 2021-05-31

## 2021-05-10 RX ORDER — GABAPENTIN 300 MG/1
300 CAPSULE ORAL 3 TIMES DAILY
Qty: 42 CAP | Refills: 0 | Status: SHIPPED | OUTPATIENT
Start: 2021-05-10 | End: 2021-05-24

## 2021-05-10 RX ORDER — PANTOPRAZOLE SODIUM 40 MG/1
40 TABLET, DELAYED RELEASE ORAL
Status: DISCONTINUED | OUTPATIENT
Start: 2021-05-10 | End: 2021-05-10 | Stop reason: HOSPADM

## 2021-05-10 RX ORDER — NYSTATIN 100000 [USP'U]/ML
500000 SUSPENSION ORAL 4 TIMES DAILY
Qty: 200 ML | Refills: 0 | Status: SHIPPED | OUTPATIENT
Start: 2021-05-10 | End: 2021-05-20

## 2021-05-10 RX ORDER — HYDROCODONE BITARTRATE AND ACETAMINOPHEN 5; 325 MG/1; MG/1
1 TABLET ORAL
Status: DISCONTINUED | OUTPATIENT
Start: 2021-05-10 | End: 2021-05-10 | Stop reason: HOSPADM

## 2021-05-10 RX ADMIN — PANTOPRAZOLE SODIUM 40 MG: 40 TABLET, DELAYED RELEASE ORAL at 08:21

## 2021-05-10 RX ADMIN — Medication 10 ML: at 05:45

## 2021-05-10 RX ADMIN — FLECAINIDE ACETATE 150 MG: 100 TABLET ORAL at 08:20

## 2021-05-10 RX ADMIN — ENOXAPARIN SODIUM 40 MG: 40 INJECTION SUBCUTANEOUS at 08:20

## 2021-05-10 RX ADMIN — ATENOLOL 25 MG: 25 TABLET ORAL at 08:21

## 2021-05-10 RX ADMIN — SERTRALINE HYDROCHLORIDE 50 MG: 50 TABLET ORAL at 08:21

## 2021-05-10 RX ADMIN — ASPIRIN 81 MG: 81 TABLET ORAL at 08:21

## 2021-05-10 RX ADMIN — ACETAMINOPHEN 1000 MG: 500 TABLET ORAL at 05:45

## 2021-05-10 RX ADMIN — NYSTATIN 500000 UNITS: 100000 SUSPENSION ORAL at 08:20

## 2021-05-10 RX ADMIN — GABAPENTIN 300 MG: 300 CAPSULE ORAL at 08:21

## 2021-05-10 RX ADMIN — SENNOSIDES AND DOCUSATE SODIUM 2 TABLET: 8.6; 5 TABLET ORAL at 08:21

## 2021-05-10 RX ADMIN — AMLODIPINE BESYLATE 5 MG: 5 TABLET ORAL at 08:21

## 2021-05-10 NOTE — DISCHARGE SUMMARY
Abby Dias  MRN: [id number]     : 1945     Age: 77 y.o. Admit date: 2021     Discharge date: 5/10/2021   Attending Physician: Dr. Thad Johnson MD  Primary Discharge Diagnosis:   Principal Problem:    IPMN (intraductal papillary mucinous neoplasm) (2021)      Primary Operations or Procedures Performed :  Procedure(s):  DISTAL PANCREATECTOMY AND SPLENECTOMY LAPAROSCOPIC, CONVERTED TO OPEN; CHOLECYSTECTOMY; EXTENSIVE VASCULAR DISSECTION;  FALCIFORM LIGAMENT FLAP; INTRAOPERATIVE US     Brief History and Reason for Admission: Abby Dias was admitted with the following history of present illness. Hany Choi is a 77 y.o. female  who was referred to the office by Dr. Dominic Muhammad presents with complaints of Pancreatic cyst, appears to be main duct IPMN. she has underwent EUS in  and then MRI 3/21. Appears to be getting slightly larger and communicating with main duct. Mass involves pancreas  body per EUS. She has had a cyst since 2018 and had yearly workup. No history of pancreatitis. She complains of belching, mid sternal pain and back pain, but states her GI switched medication and she feels these symptoms are somewhat better. She also complains of right subcostal pain with eating. She does complain of constipation going every 3-4 days. She was diagnosed with DM last year. No current chest pain or shortness of breath. Denies history of smoking and drinks occassionally.      Family history of cancer- Father lung cancer  Medical history includes Diabetes Mellitus , HTN and A-fib,  Surgery includes- no abdominal surgeries. does take blood thinners- ASA 81mg and take flecainide for her [facility] Course: The patient progressed satisfactorily meeting milestones necessary for successful discharge including tolerating a diet, adequate mobility, adequate pain control, and active bowel function.  She does appear to have a chyle leak noted to farhan drain- will discharge with drain. Patient was deemed a good candidate for discharge at the time of morning rounding. They are to follow up as indicated in their provided discharge paperwork. The patient helped develop and voices understanding with the plan of care. They are amenable without reservations at this time to moving forward with discharge. Dr. Donavan Gonzalez did inform her of her pathology report and discussed may need chemotherapy. DIAGNOSIS   A: COMMON HEPATIC LYMPH NODE X3:   THREE BENIGN LYMPH NODES. B: DISTAL PANCREATECTOMY AND SPLENECTOMY:   INVASIVE DUCTAL ADENOCARCINOMA ARISING IN AN INTRADUCTAL PAPILLARY MUCINOUS NEOPLASM. TUMOR IS 3.6 CM IN GREATEST DIMENSION. MARGINS ARE NEGATIVE FOR TUMOR. PERINEURAL INVASION IS IDENTIFIED. BENIGN SPLEEN WITH CONGESTION. EIGHTEEN BENIGN LYMPH NODES (0/18). SEE ATTACHED CANCER CHECKLIST. C: GALLBLADDER:   BENIGN GALLBLADDER     Condition at Discharge: good    Discharge Medications:   Current Discharge Medication List      START taking these medications    Details   enoxaparin (LOVENOX) 40 mg/0.4 mL 0.4 mL by SubCUTAneous route daily for 21 days. Indications: deep vein thrombosis prevention  Qty: 8.4 mL, Refills: 0      gabapentin (NEURONTIN) 300 mg capsule Take 1 Cap by mouth three (3) times daily for 14 days. Indications: acute pain following an operation  Qty: 42 Cap, Refills: 0      HYDROcodone-acetaminophen (NORCO) 5-325 mg per tablet Take 1 Tab by mouth every six (6) hours as needed for Pain for up to 5 days. Max Daily Amount: 4 Tabs. Qty: 20 Tab, Refills: 0    Associated Diagnoses: Malignant neoplasm of body of pancreas (HCC)      nystatin (MYCOSTATIN) 100,000 unit/mL suspension Take 5 mL by mouth four (4) times daily for 10 days. swish and spit  Indications: thrush  Qty: 200 mL, Refills: 0         CONTINUE these medications which have NOT CHANGED    Details   sertraline (ZOLOFT) 50 mg tablet Take 50 mg by mouth daily.       ascorbic acid (VITAMIN C PO) Take 1 Tab by mouth daily. aspirin delayed-release 81 mg tablet Take  by mouth daily. atenoloL (TENORMIN) 25 mg tablet Take 25 mg by mouth daily. flecainide (TAMBOCOR) 150 mg tablet Take 150 mg by mouth daily. pravastatin (PRAVACHOL) 10 mg tablet Take  by mouth nightly. amLODIPine (NORVASC) 5 mg tablet Take 5 mg by mouth daily. triamcinolone (ARISTOCORT) 0.5 % topical cream Apply  to affected area two (2) times a day. use thin layer      pantoprazole (Protonix) 40 mg tablet Take 40 mg by mouth daily. Disposition/Discharge Instructions/Follow-up Care: Follow-up with Dr. Lieutenant Ott.  Keep incisions clean and dry, may remain uncovered. Empty and record drain output- bring record with you to office. Empty when bulb is 1/2 full and always make sure bulb has a vacuum on it. Will need vaccinations related to spleen removal- will have office arrange, but if have not heard from office in 1 week please call office to have arranged. Do not apply lotions, creams or ointments to incisions.     Diet - as tolerated  Activity - ambulate - as tolerated - no heavy lifting >20lb. May shower - no tub baths or soaking/submerging.     No driving while taking narcotics. Do not drink alcohol while taking narcotics. Resume other home medications.   Take Rx as prescribed   Take stool softeners while on narcotics to avoid constipation     If problems or questions arise, please call our office at [phone].     Greater than 30 minutes were spent discharging the patient          Signed:  America Ramirez NP  5/10/2021  8:07 AM

## 2021-05-10 NOTE — PROGRESS NOTES
CM met with patient to discuss discharge needs. Patient has stated DME is no longer needed and confirmed no HH services at discharge. CM was receptive to this update. Patient states she feels fine and is able to ambulate to restroom in room, without any issues. Patient to discharge home this day. Spouse to transport the patient home. Please consult or notify CM if any needs shall arise. CM remains available. Care Management Interventions  PCP Verified by CM: Yes  Mode of Transport at Discharge: Other (see comment)(Spouse Reji Alfonso [phone].)  Transition of Care Consult (CM Consult): Discharge Planning  Discharge Durable Medical Equipment: No  Physical Therapy Consult: Yes  Occupational Therapy Consult: No  Speech Therapy Consult: No  Current Support Network: Lives with Spouse, Own Home(Patient lives with her spouse Reji Alfonso [phone]. .)  Confirm Follow Up Transport: Self  The Plan for Transition of Care is Related to the Following Treatment Goals : Return to Baseline. The Patient and/or Patient Representative was Provided with a Choice of Provider and Agrees with the Discharge Plan?: Yes  Name of the Patient Representative Who was Provided with a Choice of Provider and Agrees with the Discharge Plan: Patient.    [place] Resource Information Provided?: No  Discharge Location  Discharge Placement: Home

## 2021-05-10 NOTE — PROGRESS NOTES
When nurse went to assess patient. Patient informed nurse that her mouth was hurting and sore. Nurse inspected patient mouth and patient did have some open sores in the back of her throat and white patches on the sides of mouth and tongue. Nurse notified NP for gen surgery Doren Conception. Orders received for the nystatin swish and spit. Orders placed. Nurse also notified NP of drainage in the MAX drain due to the fact that it had been charted that drainage was serosanguinous and when this nurse emptied drain it was a milky color with pink tinge to it.

## 2021-05-10 NOTE — DISCHARGE INSTRUCTIONS
Discharge Instructions/Follow-up Plans:   MD Instructions:     Follow-up with Dr. Jeremy Pacheco.  Keep incisions clean and dry, may remain uncovered. Empty and record drain output- bring record with you to office. Empty when bulb is 1/2 full and always make sure bulb has a vacuum on it. Will need vaccinations related to spleen removal- will have office arrange, but if have not heard from office in 1 week please call office to have arranged. Do not apply lotions, creams or ointments to incisions.     Diet - as tolerated  Activity - ambulate - as tolerated - no heavy lifting >20lb. May shower - no tub baths or soaking/submerging.     No driving while taking narcotics. Do not drink alcohol while taking narcotics. Resume other home medications.   Take Rx as prescribed   Take stool softeners while on narcotics to avoid constipation     If problems or questions arise, please call our office at [phone].     Greater than 30 minutes were spent discharging the patient

## 2021-05-10 NOTE — PROGRESS NOTES
Hourly rounds performed this shift, needs met at this time. Bed in low/locked position and call light within reach. Will  give bedside report to oncoming nurse. Patient pain managed with scheduled tylenol. Nawaf drain patent and draining. In total 120 ml this shift.

## 2021-05-10 NOTE — PROGRESS NOTES
Problem: Falls - Risk of  Goal: *Absence of Falls  Description: Document Edwardo Mcintosh Fall Risk and appropriate interventions in the flowsheet. 5/10/2021 1115 by Thais Beck RN  Outcome: Resolved/Met  5/10/2021 0755 by Thais Beck RN  Outcome: Progressing Towards Goal  Note: Fall Risk Interventions:  Mobility Interventions: Assess mobility with egress test         Medication Interventions: Teach patient to arise slowly    Elimination Interventions: Call light in reach              Problem: Patient Education: Go to Patient Education Activity  Goal: Patient/Family Education  5/10/2021 1115 by Thais Beck RN  Outcome: Resolved/Met  5/10/2021 0755 by Thais Beck RN  Outcome: Progressing Towards Goal     Problem: Pain  Goal: *Control of Pain  5/10/2021 1115 by Thais Beck RN  Outcome: Resolved/Met  5/10/2021 0755 by Thais Beck RN  Outcome: Progressing Towards Goal     Problem: Patient Education: Go to Patient Education Activity  Goal: Patient/Family Education  5/10/2021 1115 by Thais Beck RN  Outcome: Resolved/Met  5/10/2021 0755 by Thais Beck RN  Outcome: Progressing Towards Goal     Pt in no distress, no C/O pain/N/V/diarrhea, tolerating meds/diet, POC/safety complete, pt has received discharge pprwrk, instructions discussed with pt and spouse, pt and spouse state they comprehend, IV removed, MAX drained emptied/documented, dressing changed, instructed patient and spouse on how to care for incision/MAX drain and lovenox injections, pt spouse to transport home, follow-up appointments discussed, prescriptions sent electronically .

## 2021-05-10 NOTE — PROGRESS NOTES
Problem: Falls - Risk of  Goal: *Absence of Falls  Description: Document Trevor Strauss Fall Risk and appropriate interventions in the flowsheet. Outcome: Progressing Towards Goal  Note: Fall Risk Interventions:  Mobility Interventions: Assess mobility with egress test         Medication Interventions: Teach patient to arise slowly    Elimination Interventions: Call light in reach              Problem: Patient Education: Go to Patient Education Activity  Goal: Patient/Family Education  Outcome: Progressing Towards Goal     Problem: Pain  Goal: *Control of Pain  Outcome: Progressing Towards Goal     Problem: Patient Education: Go to Patient Education Activity  Goal: Patient/Family Education  Outcome: Progressing Towards Goal     Pt in no distress, no C/O pain/N/V/diarrhea, tolerating meds/diet, POC/safety reviewed w/pt and spouse, pt & spouse comprehend POC/safety, IV/MAX drain intact, abd staples intact, pt on room air, possible D/C today per surgical progress note from yesterday.

## 2021-05-18 PROBLEM — C25.1 MALIGNANT NEOPLASM OF BODY OF PANCREAS (HCC): Status: ACTIVE | Noted: 2021-05-18

## 2021-05-19 ENCOUNTER — HOSPITAL ENCOUNTER (OUTPATIENT)
Dept: INFUSION THERAPY | Age: 76
Discharge: HOME | End: 2021-05-19
Payer: MEDICARE

## 2021-05-19 VITALS
OXYGEN SATURATION: 95 % | DIASTOLIC BLOOD PRESSURE: 66 MMHG | HEART RATE: 61 BPM | RESPIRATION RATE: 16 BRPM | SYSTOLIC BLOOD PRESSURE: 148 MMHG | TEMPERATURE: 98.1 F

## 2021-05-19 PROCEDURE — 90670 PCV13 VACCINE IM: CPT | Performed by: SURGERY

## 2021-05-19 PROCEDURE — 90471 IMMUNIZATION ADMIN: CPT

## 2021-05-19 PROCEDURE — 90648 HIB PRP-T VACCINE 4 DOSE IM: CPT | Performed by: SURGERY

## 2021-05-19 PROCEDURE — 90734 MENACWYD/MENACWYCRM VACC IM: CPT | Performed by: SURGERY

## 2021-05-19 PROCEDURE — 90472 IMMUNIZATION ADMIN EACH ADD: CPT

## 2021-05-19 PROCEDURE — 74011250636 HC RX REV CODE- 250/636: Performed by: SURGERY

## 2021-05-19 RX ADMIN — Medication 0.5 ML: at 15:20

## 2021-05-19 RX ADMIN — Medication 0.5 ML: at 15:22

## 2021-05-19 RX ADMIN — PNEUMOCOCCAL 13-VALENT CONJUGATE VACCINE 0.5 ML: 2.2; 2.2; 2.2; 2.2; 2.2; 4.4; 2.2; 2.2; 2.2; 2.2; 2.2; 2.2; 2.2 INJECTION, SUSPENSION INTRAMUSCULAR at 15:18

## 2021-05-19 NOTE — PROGRESS NOTES
Arrived to the [facility]. Stefani Nava, and JOAQUINA-Daphnie completed. Provided education on side effects to report; vaccine info sheets given to pt. Patient instructed to report any side affects to ordering provider. Patient tolerated well. Any issues or concerns during appointment: none. No future infusion appts scheduled at this time. Discharged ambulatory with spouse.

## 2021-05-25 ENCOUNTER — HOSPITAL ENCOUNTER (OUTPATIENT)
Dept: CT IMAGING | Age: 76
Discharge: HOME | End: 2021-05-25
Attending: SURGERY
Payer: MEDICARE

## 2021-05-25 DIAGNOSIS — R10.9 LEFT FLANK PAIN: ICD-10-CM

## 2021-05-25 PROCEDURE — 74011000258 HC RX REV CODE- 258: Performed by: SURGERY

## 2021-05-25 PROCEDURE — 74011000636 HC RX REV CODE- 636: Performed by: SURGERY

## 2021-05-25 PROCEDURE — 74177 CT ABD & PELVIS W/CONTRAST: CPT

## 2021-05-25 RX ORDER — SODIUM CHLORIDE 0.9 % (FLUSH) 0.9 %
10 SYRINGE (ML) INJECTION
Status: COMPLETED | OUTPATIENT
Start: 2021-05-25 | End: 2021-05-25

## 2021-05-25 RX ADMIN — Medication 10 ML: at 17:56

## 2021-05-25 RX ADMIN — IOPAMIDOL 100 ML: 755 INJECTION, SOLUTION INTRAVENOUS at 17:55

## 2021-05-25 RX ADMIN — SODIUM CHLORIDE 100 ML: 900 INJECTION, SOLUTION INTRAVENOUS at 17:56

## 2021-05-25 RX ADMIN — DIATRIZOATE MEGLUMINE AND DIATRIZOATE SODIUM 15 ML: 660; 100 LIQUID ORAL; RECTAL at 17:56

## 2021-05-28 ENCOUNTER — HOSPITAL ENCOUNTER (OUTPATIENT)
Age: 76
Setting detail: OUTPATIENT SURGERY
Discharge: HOME | End: 2021-05-28
Attending: INTERNAL MEDICINE | Admitting: INTERNAL MEDICINE
Payer: MEDICARE

## 2021-05-28 VITALS
OXYGEN SATURATION: 94 % | TEMPERATURE: 99 F | HEIGHT: 62 IN | BODY MASS INDEX: 25.21 KG/M2 | RESPIRATION RATE: 16 BRPM | SYSTOLIC BLOOD PRESSURE: 147 MMHG | DIASTOLIC BLOOD PRESSURE: 65 MMHG | HEART RATE: 68 BPM | WEIGHT: 137 LBS

## 2021-05-28 DIAGNOSIS — J90 PLEURAL EFFUSION: ICD-10-CM

## 2021-05-28 PROBLEM — G56.03 CARPAL TUNNEL SYNDROME, BILATERAL: Status: ACTIVE | Noted: 2019-04-15

## 2021-05-28 PROBLEM — E13.9 DIABETES 1.5, MANAGED AS TYPE 2 (HCC): Status: ACTIVE | Noted: 2020-06-20

## 2021-05-28 PROBLEM — E78.2 MIXED HYPERLIPIDEMIA: Status: ACTIVE | Noted: 2019-01-15

## 2021-05-28 PROBLEM — R29.818 SUSPECTED SLEEP APNEA: Status: ACTIVE | Noted: 2021-05-28

## 2021-05-28 PROBLEM — K21.9 GASTROESOPHAGEAL REFLUX DISEASE WITHOUT ESOPHAGITIS: Status: ACTIVE | Noted: 2018-05-15

## 2021-05-28 LAB
APPEARANCE FLD: NORMAL
COLOR FLD: YELLOW
GLUCOSE FLD-MCNC: 236 MG/DL
LDH FLD L TO P-CCNC: 182 U/L
LYMPHOCYTES NFR BRONCH MANUAL: 88 %
NEUTROPHILS NFR BRONCH MANUAL: 12 %
NUC CELL # FLD: 220 /CU MM
PROT FLD-MCNC: 5 G/DL
RBC # FLD: 1000 /CU MM
SPECIMEN SOURCE FLD: NORMAL
TOTAL CELLS COUNTED SPEC: 50

## 2021-05-28 PROCEDURE — 82945 GLUCOSE OTHER FLUID: CPT

## 2021-05-28 PROCEDURE — 76040000019: Performed by: INTERNAL MEDICINE

## 2021-05-28 PROCEDURE — 99204 OFFICE O/P NEW MOD 45 MIN: CPT | Performed by: INTERNAL MEDICINE

## 2021-05-28 PROCEDURE — 88112 CYTOPATH CELL ENHANCE TECH: CPT

## 2021-05-28 PROCEDURE — 87102 FUNGUS ISOLATION CULTURE: CPT

## 2021-05-28 PROCEDURE — 83615 LACTATE (LD) (LDH) ENZYME: CPT

## 2021-05-28 PROCEDURE — 89050 BODY FLUID CELL COUNT: CPT

## 2021-05-28 PROCEDURE — 32555 ASPIRATE PLEURA W/ IMAGING: CPT | Performed by: INTERNAL MEDICINE

## 2021-05-28 PROCEDURE — 84157 ASSAY OF PROTEIN OTHER: CPT

## 2021-05-28 PROCEDURE — 87116 MYCOBACTERIA CULTURE: CPT

## 2021-05-28 PROCEDURE — 77030027133: Performed by: INTERNAL MEDICINE

## 2021-05-28 PROCEDURE — 77030014147 HC TY THORCENT PARA TELE -B: Performed by: INTERNAL MEDICINE

## 2021-05-28 PROCEDURE — 87205 SMEAR GRAM STAIN: CPT

## 2021-05-28 PROCEDURE — 2709999900 HC NON-CHARGEABLE SUPPLY: Performed by: INTERNAL MEDICINE

## 2021-05-28 NOTE — H&P
HISTORY AND PHYSICAL      Yasir Jeffrey    5/28/2021    Date of Admission:  5/28/2021    The patient's chart is reviewed and the patient is discussed with the staff. Subjective:     Patient is a 77 y.o.  female, PMH HTN, GERD, DM, intraductal papillary mucinous neoplasm, and HLD who  presents with left pleural effusion noted on CT abdomen/pelvis done on 5/25/2021. The patient recently had a distal pancreatectomy and splenectomy laparoscopic, converted to open; choleycystectomy; extensive vascular dissection; falciform ligament flap done on 5/5/2021 for pancreatic cyst that appeared to be main duct IPMN. She was discharged home on 5/10/2021. Pathology revealed three benign common hepatic lymph nodes and invasive ductal adenocarcinoma arising in an intraductal papillary mucinous neoplasm, tumor measuring 3.6 cm at greatest dimension; perineural invasion identified, benign spleen with congesstion, T2N0. The patient later c/o left back/flank pain as well as left knee edema and CT abdomen and pelvis was obtained for further evaluation. This revealed 4 cm fluid collection in pancreas, most likely pseudocyst as well as a large left pleural effusion. The patient states that over the past week she has been increasingly more short of breath, especially with exertion. She also states that she has been having chills and running low grade fevers, .2 and has been taking Tylenol for this. She denies any nausea, vomiting, or diarrhea. She has not been coughing up anything. The patient denies any previous lung problems including asthma. The patient does endorse snoring at night as well as having problems with hypoxia when being \"put to sleep\". Never tested for REINA. She denies having ever had a thoracentesis in the past be is agreeable to procedure being done today.           Review of Systems  A comprehensive review of systems was negative except for that written in the HPI.    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 Taking? amLODIPine (NORVASC) 5 mg tablet   Yes No   Sig: Take 5 mg by mouth daily. ascorbic acid (VITAMIN C PO)   Yes No   Sig: Take 1 Tab by mouth daily. aspirin delayed-release 81 mg tablet 2021 at Unknown time  Yes Yes   Sig: Take  by mouth daily. atenoloL (TENORMIN) 25 mg tablet   Yes No   Sig: Take 25 mg by mouth daily. cyclobenzaprine (FLEXERIL) 10 mg tablet   No No   Sig: Take 1 Tablet by mouth three (3) times daily as needed for Muscle Spasm(s) for up to 14 days. enoxaparin (LOVENOX) 40 mg/0.4 mL 2021  No No   Si.4 mL by SubCUTAneous route daily for 21 days. Indications: deep vein thrombosis prevention   flecainide (TAMBOCOR) 150 mg tablet   Yes No   Sig: Take 150 mg by mouth daily. pantoprazole (Protonix) 40 mg tablet   Yes No   Sig: Take 40 mg by mouth daily. pravastatin (PRAVACHOL) 10 mg tablet   Yes No   Sig: Take  by mouth nightly. sertraline (ZOLOFT) 50 mg tablet   Yes No   Sig: Take 50 mg by mouth daily. triamcinolone (ARISTOCORT) 0.5 % topical cream   Yes No   Sig: Apply  to affected area two (2) times a day. use thin layer      Facility-Administered Medications: None       Past Medical History:   Diagnosis Date    Adverse effect of anesthesia     Oxygen dropped during EUS and Colonoscopy    Arrhythmia     AT/SVT;  Followed by Dr. Thomason-Cardiologist    Depressed     managed with med    GERD (gastroesophageal reflux disease) managed with med    Hypercholesterolemia     managed with med    Hypertension     managed with med    IPMN (intraductal papillary mucinous neoplasm)     Pancreatic cyst     [facility]     no meds; monitoring; controlled with diet and exercise     Past Surgical History:   Procedure Laterality Date    HX BREAST BIOPSY Right     per pt was fatty tissue    HX COLONOSCOPY  2016    HX GYN      uterine fibroid    HX TONSILLECTOMY       Social History     Socioeconomic History    Marital status:      Spouse name: Not on file    Number of children: Not on file    Years of education: Not on file    Highest education level: Not on file   Occupational History    Not on file   Tobacco Use    Smoking status: Never Smoker    Smokeless tobacco: Never Used   Substance and Sexual Activity    Alcohol use: Yes     Comment: occ wine and silvia    Drug use: Never    Sexual activity: Not on file   Other Topics Concern    Not on file   Social History Narrative    Not on file     Social Determinants of Health     Financial Resource Strain:     Difficulty of Paying Living Expenses:    Food Insecurity:     Worried About [address] in the Last Year:     [address] in the Last Year:    Transportation Needs:     Lack of Transportation (Medical):  Lack of Transportation (Non-Medical):    Physical Activity:     Days of Exercise per Week:     Minutes of Exercise per Session:    Stress:     Feeling of Stress :    Social Connections:     Frequency of Communication with Friends and Family:     Frequency of Social Gatherings with Friends and Family:     Attends [employer] Services:     Active Member of Clubs or Organizations:     Attends Club or Organization Meetings:     Marital Status:    Intimate Partner Violence:     Fear of Current or Ex-Partner:     Emotionally Abused:     Physically Abused:     Sexually Abused:      History reviewed.  No pertinent family history. Allergies   Allergen Reactions    Lisinopril Palpitations       No current facility-administered medications for this encounter. Objective:     Vitals:    05/28/21 1158   BP: (!) 179/73   Pulse: 67   Resp: 18   Temp: 99 °F (37.2 °C)   SpO2: 92%   Weight: 137 lb (62.1 kg)   Height: 5' 2\" (1.575 m)       PHYSICAL EXAM     Constitutional:  the patient is well developed and in no acute distress  EENMT:  Sclera clear, pupils equal, oral mucosa moist  Respiratory: Decreased on left, on RA  Cardiovascular:  RRR without M,G,R  Gastrointestinal: soft and non-tender; with positive bowel sounds. Musculoskeletal: warm without cyanosis. There is no lower extremity edema. Skin:  no jaundice or rashes, no wounds   Neurologic: no gross neuro deficits     Psychiatric:  alert and oriented x 3, very pleasant    CXR:  CXR Results  (Last 48 hours)    None          CT ABDOMEN/PELVIS:  5/25/2021    IMPRESSION  1.  4 cm fluid collection contiguous with the resection margin in the pancreas, most likely pseudocyst.  2.  Large left pleural effusion with associated partial atelectasis of the left  lung. No results for input(s): WBC, HGB, HCT, PLT, INR, HGBEXT, HCTEXT, PLTEXT, INREXT, HGBEXT, HCTEXT, PLTEXT, INREXT in the last 72 hours. No results for input(s): NA, K, CL, GLU, CO2, BUN, CREA, MG, PHOS, CA, TROIQ, ALB, TBIL, TBILI, ALT, BNPP in the last 72 hours. No lab exists for component: TROIP, GPT, SGOT  No results for input(s): PH, PCO2, PO2, HCO3, PHI, PCO2I, PO2I, HCO3I in the last 72 hours. No results for input(s): LCAD, LAC in the last 72 hours.     Assessment:  (Medical Decision Making)     Hospital Problems  Date Reviewed: 5/28/2021        Codes Class Noted POA    Pleural effusion on left ICD-10-CM: J90  ICD-9-CM: 511.9  5/28/2021 Unknown        Suspected sleep apnea ICD-10-CM: R29.818  ICD-9-CM: 781.99  5/28/2021 Unknown              Plan:  (Medical Decision Making)     --Patient agreeable to left thoracentesis today, will send fluid for cultures, pathology  --? Fevers/chills X 1 week, ?antibiotic therapy  --Patient mentioned hypoxia when put to sleep for procedures in the past, does snore, would benefit from outpatient sleep study  --Follow up in our office per Dr. Milly Beckford    More than 50% of the time documented was spent in face-to-face contact with the patient and in the care of the patient on the floor/unit where the patient is located. Renae Erp, NP   Lungs: decreased breath sounds on the left  Heart:  RRR with no Murmur/Rubs/Gallops    Additional Comments:  Proceed with thoracentesis    I have spoken with and examined the patient. I agree with the above assessment and plan as documented.     Brian Carlson MD

## 2021-05-28 NOTE — PROCEDURES
PROCEDURE:    DIAGNOSTIC/THERAPEUTIC THORACENTESIS/PLEURAL MANNOMETRY. PRE-OP DIAGNOSIS:    L PLEURAL EFFUSION    POST-OP DIAGNOSIS:    L PLEURAL EFFUSION    ASSISTANT:    Plumbly    ANESTHESIA:    LOCAL ANESTHESIA WITH 1% LIDOCAINE 10 CC TOTAL. CHEST ULTRASOUND FINDINGS:    A Turbo-M, Sonosite ultrasound with a 5-16 mHz probe was used to image the chest and localize the pleural effusion on the Left/and/Right chest.        A/moderate/  complex pleural space was identified on ultrasound, there were multiple loculations and septetions present, with the pleural fluid having a mixed echogenic character. DESCRIPTION OF PROCEDURE:    After obtaining informed consent and localizing the safest location for thoracentesis, the  9th intercostal space was marked with a blunt, plastic needle cap in the mid scapular line. An [employer] AK-0100 Pleral-Seal thoracentesis kit was used to perform the procedure. The skin was  cleansed with the supplied  chlorhexididne swab and then draped in the usual fasion. Using the previously marked location as a giude, a 22 G 1.5 inch needle was used to inject 10 cc of 1% lidocaine into the skin and subcutaneous tissue, as well as onto the underlying rib and inter-costal muscles, pleural fluid was aspirated to assure proper location, prior to removing the anesthesia needle. A 3mm  incision was then made, with the supplied scalpel in the usual fashion to facilitate the insertiopn of the thoracentesis needle. The needle with an 8French thoracentesis catheter was then introduced into the chest through the previously made incision in the usual fashion, the rib localized with the needle, and the catheter then marched over the rib into the pleural space. After aspirating fluid, the thoracentesis catheter was then placed into the chest using the needle itself as a trocar.   The needle was then removed and the catheter was attached to the supplied tubing without complication. 50 cc of /Yellow/ fluid, was aspirated and sent for analysis. This is all we could get. Fluid was sent for the following tests:    Cell count with differential  LDH  Glucose  Total protein  Cytology    AFB  Fungus  Routine culture and Gram stain      Post procedure US confirmed /incomplete drainage of the effusion.  + lung slide    EBL:   minimal      COMPLICATIONS:    none    Almitarodrigo Lainez MD

## 2021-05-28 NOTE — DISCHARGE INSTRUCTIONS
Keep band aid clean dry and intact for 24 hours. You may shower tomorrow afternoon    Resume Lovenox injections tomorrow Saturday 5/29/2021      Patient Education        Thoracentesis: What to Expect at Home  Your Recovery  Thoracentesis (say \"umqs-eu-evt-ASHTYN-sis\") is a procedure to remove fluid from the space between the lungs and the chest wall (pleural space). This procedure may also be called a \"chest tap. \" It's normal to have a small amount of fluid in the pleural space. But too much fluid can build up because of problems such as infection, heart failure, or lung cancer. The procedure may have been done to help with shortness of breath and pain caused by the fluid buildup. Or you may have had this procedure so the doctor could test the fluid to find the cause of the buildup. Your chest may be sore where the doctor put the needle or catheter into your skin (the puncture site). This usually gets better after a day or two. You can go back to work or your normal activities as soon as you feel up to it. If a large amount of pleural fluid was removed during the procedure, you will probably be able to breathe more easily. If more pleural fluid collects and needs to be removed, another thoracentesis may be done later. If the doctor sent the fluid to a lab for testing, it may take several days to get the results. The doctor or nurse will discuss the results with you. This care sheet gives you a general idea about how long it will take for you to recover. But each person recovers at a different pace. Follow the steps below to feel better as quickly as possible. How can you care for yourself at home? Activity    · Rest when you feel tired. Getting enough sleep will help you recover.     · Avoid strenuous activities, such as bicycle riding, jogging, weight lifting, or aerobic exercise, until your doctor says it is okay.     · You may shower.  Do not take a bath until the puncture site has healed, or until your doctor tells you it is okay.     · Ask your doctor when you can drive again.     · You may need to take 1 or 2 days off from work. It depends on the type of work you do and how you feel. Diet    · You can eat your normal diet.     · Drink plenty of fluids (unless your doctor tells you not to). Medicines    · Your doctor will tell you if and when you can restart your medicines. He or she will also give you instructions about taking any new medicines.     · If you take aspirin or some other blood thinner, ask your doctor if and when to start taking it again. Make sure that you understand exactly what your doctor wants you to do.     · Be safe with medicines. Take pain medicines exactly as directed. ? If the doctor gave you a prescription medicine for pain, take it as prescribed. ? If you are not taking a prescription pain medicine, ask your doctor if you can take an over-the-counter medicine. ? Do not take two or more pain medicines at the same time unless the doctor told you to. Many pain medicines have acetaminophen, which is Tylenol. Too much acetaminophen (Tylenol) can be harmful.     · If you think your pain medicine is making you sick to your stomach:  ? Take your medicine after meals (unless your doctor has told you not to). ? Ask your doctor for a different pain medicine.     · If your doctor prescribed antibiotics, take them as directed. Do not stop taking them just because you feel better. You need to take the full course of antibiotics. Care of the puncture site    · Wash the area daily with warm, soapy water, and pat it dry. Don't use hydrogen peroxide or alcohol, which may delay healing. You may cover the area with a gauze bandage if it weeps or rubs against clothing. Change the bandage every day.     · Keep the area clean and dry. Follow-up care is a key part of your treatment and safety. Be sure to make and go to all appointments, and call your doctor if you are having problems.  It's also a good idea to know your test results and keep a list of the medicines you take. When should you call for help? Call 911 anytime you think you may need emergency care. For example, call if:    · You passed out (lost consciousness).     · You have severe trouble breathing.     · You have sudden chest pain and shortness of breath, or you cough up blood. Call your doctor now or seek immediate medical care if:    · You have shortness of breath that is new or getting worse.     · You have new or worse pain in your chest, especially when you take a deep breath.     · You are sick to your stomach or cannot keep fluids down.     · You have a fever over 100°F.     · Bright red blood has soaked through the bandage over your puncture site.     · You have signs of infection, such as:  ? Increased pain, swelling, warmth, or redness. ? Red streaks leading from the puncture site. ? Pus draining from the puncture site. ? Swollen lymph nodes in your neck, armpits, or groin. ? A fever.     · You cough up a lot more mucus than normal, or your mucus changes color. Watch closely for changes in your health, and be sure to contact your doctor if you have any problems. Where can you learn more? Go to http://www.gray.com/  Enter Q755 in the search box to learn more about \"Thoracentesis: What to Expect at Home. \"  Current as of: October 26, 2020               Content Version: 12.8  © 4263-6096 [employer]. Care instructions adapted under license by [employer] (which disclaims liability or warranty for this information). If you have questions about a medical condition or this instruction, always ask your healthcare professional. [address] any warranty or liability for your use of this information.

## 2021-05-31 LAB
BACTERIA SPEC CULT: NORMAL
GRAM STN SPEC: NORMAL
GRAM STN SPEC: NORMAL
SERVICE CMNT-IMP: NORMAL

## 2021-06-08 ENCOUNTER — PATIENT OUTREACH (OUTPATIENT)
Dept: CASE MANAGEMENT | Age: 76
End: 2021-06-08

## 2021-06-08 NOTE — PROGRESS NOTES
6/8/21 saw pt today with Dr. Benny Almeida for initial medical oncology consult for pancreatic cancer. Her  is here with her today. Recommend FOLFIRINOX - regimen reviewed including side effects. Will arrange port with Dr. Pearl Hannah and chemo education. Rx sent to pharmacy, pt aware. Provided opportunity to ask questions and all were discussed. My contact information was provided and I encouraged her to call with any questions or concerns. Plan follow up 7/12 to start. Navigation will continue to follow.

## 2021-06-25 LAB
FUNGUS CULTURE, RFCO2T: NEGATIVE
FUNGUS SMEAR, RFCO1T: NORMAL
FUNGUS SPEC CULT: NORMAL
FUNGUS STAIN, 188244: NORMAL
REFLEX TO ID, RFCO3T: NORMAL
SPECIMEN SOURCE: NORMAL
SPECIMEN SOURCE: NORMAL

## 2021-06-29 ENCOUNTER — HOSPITAL ENCOUNTER (OUTPATIENT)
Dept: GENERAL RADIOLOGY | Age: 76
Discharge: HOME | End: 2021-06-29
Payer: MEDICARE

## 2021-06-29 DIAGNOSIS — J90 PLEURAL EFFUSION: ICD-10-CM

## 2021-06-29 PROCEDURE — 71046 X-RAY EXAM CHEST 2 VIEWS: CPT

## 2021-06-30 ENCOUNTER — ANESTHESIA EVENT (OUTPATIENT)
Dept: SURGERY | Age: 76
End: 2021-06-30
Payer: MEDICARE

## 2021-07-01 ENCOUNTER — ANESTHESIA (OUTPATIENT)
Dept: SURGERY | Age: 76
End: 2021-07-01
Payer: MEDICARE

## 2021-07-01 ENCOUNTER — HOSPITAL ENCOUNTER (OUTPATIENT)
Age: 76
Setting detail: OUTPATIENT SURGERY
Discharge: HOME | End: 2021-07-01
Attending: SURGERY | Admitting: SURGERY
Payer: MEDICARE

## 2021-07-01 ENCOUNTER — APPOINTMENT (OUTPATIENT)
Dept: GENERAL RADIOLOGY | Age: 76
End: 2021-07-01
Attending: SURGERY
Payer: MEDICARE

## 2021-07-01 VITALS
HEART RATE: 69 BPM | SYSTOLIC BLOOD PRESSURE: 125 MMHG | HEIGHT: 60 IN | BODY MASS INDEX: 26.07 KG/M2 | RESPIRATION RATE: 18 BRPM | TEMPERATURE: 98 F | OXYGEN SATURATION: 96 % | WEIGHT: 132.8 LBS | DIASTOLIC BLOOD PRESSURE: 60 MMHG

## 2021-07-01 DIAGNOSIS — C25.1 MALIGNANT NEOPLASM OF BODY OF PANCREAS (HCC): ICD-10-CM

## 2021-07-01 LAB
GLUCOSE BLD STRIP.AUTO-MCNC: 201 MG/DL (ref 65–100)
SERVICE CMNT-IMP: ABNORMAL

## 2021-07-01 PROCEDURE — 77030031139 HC SUT VCRL2 J&J -A: Performed by: SURGERY

## 2021-07-01 PROCEDURE — 74011250636 HC RX REV CODE- 250/636: Performed by: ANESTHESIOLOGY

## 2021-07-01 PROCEDURE — 76210000020 HC REC RM PH II FIRST 0.5 HR: Performed by: SURGERY

## 2021-07-01 PROCEDURE — 74011250636 HC RX REV CODE- 250/636: Performed by: SURGERY

## 2021-07-01 PROCEDURE — 2709999900 HC NON-CHARGEABLE SUPPLY: Performed by: SURGERY

## 2021-07-01 PROCEDURE — 71045 X-RAY EXAM CHEST 1 VIEW: CPT

## 2021-07-01 PROCEDURE — 82962 GLUCOSE BLOOD TEST: CPT

## 2021-07-01 PROCEDURE — C1788 PORT, INDWELLING, IMP: HCPCS | Performed by: SURGERY

## 2021-07-01 PROCEDURE — 77030002986 HC SUT PROL J&J -A: Performed by: SURGERY

## 2021-07-01 PROCEDURE — 76010000138 HC OR TIME 0.5 TO 1 HR: Performed by: SURGERY

## 2021-07-01 PROCEDURE — 74011000250 HC RX REV CODE- 250: Performed by: SURGERY

## 2021-07-01 PROCEDURE — 74011000272 HC RX REV CODE- 272: Performed by: SURGERY

## 2021-07-01 PROCEDURE — 77030018673: Performed by: SURGERY

## 2021-07-01 PROCEDURE — 36561 INSERT TUNNELED CV CATH: CPT | Performed by: SURGERY

## 2021-07-01 PROCEDURE — 77001 FLUOROGUIDE FOR VEIN DEVICE: CPT | Performed by: SURGERY

## 2021-07-01 PROCEDURE — 76060000032 HC ANESTHESIA 0.5 TO 1 HR: Performed by: SURGERY

## 2021-07-01 PROCEDURE — 76210000006 HC OR PH I REC 0.5 TO 1 HR: Performed by: SURGERY

## 2021-07-01 DEVICE — POWERPORT M.R.I. IMPLANTABLE PORT WITH ATTACHABLE 8F CHRONOFLEX OPEN-ENDED SINGLE-LUMEN VENOUS CATHETER INTERMEDIATE KIT  (WITH SUTURE PLUGS)
Type: IMPLANTABLE DEVICE | Site: CHEST | Status: FUNCTIONAL
Brand: POWERPORT M.R.I., CHRONOFLEX

## 2021-07-01 RX ORDER — LIDOCAINE HYDROCHLORIDE 10 MG/ML
0.1 INJECTION INFILTRATION; PERINEURAL AS NEEDED
Status: DISCONTINUED | OUTPATIENT
Start: 2021-07-01 | End: 2021-07-01 | Stop reason: HOSPADM

## 2021-07-01 RX ORDER — FENTANYL CITRATE 50 UG/ML
INJECTION, SOLUTION INTRAMUSCULAR; INTRAVENOUS AS NEEDED
Status: DISCONTINUED | OUTPATIENT
Start: 2021-07-01 | End: 2021-07-01 | Stop reason: HOSPADM

## 2021-07-01 RX ORDER — OXYCODONE HYDROCHLORIDE 5 MG/1
5 TABLET ORAL
Status: DISCONTINUED | OUTPATIENT
Start: 2021-07-01 | End: 2021-07-01 | Stop reason: HOSPADM

## 2021-07-01 RX ORDER — MIDAZOLAM HYDROCHLORIDE 1 MG/ML
2 INJECTION, SOLUTION INTRAMUSCULAR; INTRAVENOUS
Status: DISCONTINUED | OUTPATIENT
Start: 2021-07-01 | End: 2021-07-01 | Stop reason: HOSPADM

## 2021-07-01 RX ORDER — SODIUM CHLORIDE 0.9 G/100ML
IRRIGANT IRRIGATION AS NEEDED
Status: DISCONTINUED | OUTPATIENT
Start: 2021-07-01 | End: 2021-07-01 | Stop reason: HOSPADM

## 2021-07-01 RX ORDER — SODIUM CHLORIDE 0.9 % (FLUSH) 0.9 %
5-40 SYRINGE (ML) INJECTION AS NEEDED
Status: DISCONTINUED | OUTPATIENT
Start: 2021-07-01 | End: 2021-07-01 | Stop reason: HOSPADM

## 2021-07-01 RX ORDER — SODIUM CHLORIDE 0.9 % (FLUSH) 0.9 %
5-40 SYRINGE (ML) INJECTION EVERY 8 HOURS
Status: DISCONTINUED | OUTPATIENT
Start: 2021-07-01 | End: 2021-07-01 | Stop reason: HOSPADM

## 2021-07-01 RX ORDER — HEPARIN 100 UNIT/ML
SYRINGE INTRAVENOUS AS NEEDED
Status: DISCONTINUED | OUTPATIENT
Start: 2021-07-01 | End: 2021-07-01 | Stop reason: HOSPADM

## 2021-07-01 RX ORDER — CEFAZOLIN SODIUM/WATER 2 G/20 ML
2 SYRINGE (ML) INTRAVENOUS ONCE
Status: COMPLETED | OUTPATIENT
Start: 2021-07-01 | End: 2021-07-01

## 2021-07-01 RX ORDER — PROPOFOL 10 MG/ML
INJECTION, EMULSION INTRAVENOUS
Status: DISCONTINUED | OUTPATIENT
Start: 2021-07-01 | End: 2021-07-01 | Stop reason: HOSPADM

## 2021-07-01 RX ORDER — HYDROMORPHONE HYDROCHLORIDE 2 MG/ML
0.5 INJECTION, SOLUTION INTRAMUSCULAR; INTRAVENOUS; SUBCUTANEOUS
Status: DISCONTINUED | OUTPATIENT
Start: 2021-07-01 | End: 2021-07-01 | Stop reason: HOSPADM

## 2021-07-01 RX ORDER — SODIUM CHLORIDE, SODIUM LACTATE, POTASSIUM CHLORIDE, CALCIUM CHLORIDE 600; 310; 30; 20 MG/100ML; MG/100ML; MG/100ML; MG/100ML
100 INJECTION, SOLUTION INTRAVENOUS CONTINUOUS
Status: DISCONTINUED | OUTPATIENT
Start: 2021-07-01 | End: 2021-07-01 | Stop reason: HOSPADM

## 2021-07-01 RX ORDER — NALOXONE HYDROCHLORIDE 0.4 MG/ML
0.04 INJECTION, SOLUTION INTRAMUSCULAR; INTRAVENOUS; SUBCUTANEOUS
Status: DISCONTINUED | OUTPATIENT
Start: 2021-07-01 | End: 2021-07-01 | Stop reason: HOSPADM

## 2021-07-01 RX ORDER — PROPOFOL 10 MG/ML
INJECTION, EMULSION INTRAVENOUS AS NEEDED
Status: DISCONTINUED | OUTPATIENT
Start: 2021-07-01 | End: 2021-07-01 | Stop reason: HOSPADM

## 2021-07-01 RX ORDER — MIDAZOLAM HYDROCHLORIDE 1 MG/ML
2 INJECTION, SOLUTION INTRAMUSCULAR; INTRAVENOUS ONCE
Status: DISCONTINUED | OUTPATIENT
Start: 2021-07-01 | End: 2021-07-01 | Stop reason: HOSPADM

## 2021-07-01 RX ORDER — BUPIVACAINE HYDROCHLORIDE AND EPINEPHRINE 5; 5 MG/ML; UG/ML
INJECTION, SOLUTION EPIDURAL; INTRACAUDAL; PERINEURAL AS NEEDED
Status: DISCONTINUED | OUTPATIENT
Start: 2021-07-01 | End: 2021-07-01 | Stop reason: HOSPADM

## 2021-07-01 RX ORDER — FENTANYL CITRATE 50 UG/ML
100 INJECTION, SOLUTION INTRAMUSCULAR; INTRAVENOUS ONCE
Status: DISCONTINUED | OUTPATIENT
Start: 2021-07-01 | End: 2021-07-01 | Stop reason: HOSPADM

## 2021-07-01 RX ADMIN — FENTANYL CITRATE 25 MCG: 50 INJECTION INTRAMUSCULAR; INTRAVENOUS at 07:50

## 2021-07-01 RX ADMIN — PROPOFOL 100 MCG/KG/MIN: 10 INJECTION, EMULSION INTRAVENOUS at 07:46

## 2021-07-01 RX ADMIN — PROPOFOL 50 MG: 10 INJECTION, EMULSION INTRAVENOUS at 07:47

## 2021-07-01 RX ADMIN — SODIUM CHLORIDE, SODIUM LACTATE, POTASSIUM CHLORIDE, AND CALCIUM CHLORIDE 100 ML/HR: 600; 310; 30; 20 INJECTION, SOLUTION INTRAVENOUS at 06:43

## 2021-07-01 RX ADMIN — CEFAZOLIN 2 G: 1 INJECTION, POWDER, FOR SOLUTION INTRAVENOUS at 07:43

## 2021-07-01 RX ADMIN — FENTANYL CITRATE 25 MCG: 50 INJECTION INTRAMUSCULAR; INTRAVENOUS at 08:07

## 2021-07-01 NOTE — OP NOTES
[address] [place] [address]  OPERATIVE REPORT    Name:  Charlie Mcdonough  MR#:  [id number]  :  1945  ACCOUNT #:  [de-identified]  DATE OF SERVICE:  2021    PREOPERATIVE DIAGNOSIS:  Pancreatic cancer. POSTOPERATIVE DIAGNOSIS:  Pancreatic cancer. PROCEDURE PERFORMED:  Port-A-Cath placement under fluoroscopy. SURGEON:  Sheree Motley MD    ANESTHESIA: MAC    COMPLICATIONS: none    SPECIMENS REMOVED: none    IMPLANTS: as above    ESTIMATED BLOOD LOSS: minimal    INDICATION:  This is a 41-year-old female who had a new diagnosis of pancreatic cancer. She needs a port for adjuvant chemotherapy. The patient understood risks and benefits, agreed to proceed. FINDINGS:  Intraoperative fluoroscopy confirmed positioning of port placement. PROCEDURE:  After informed consent was obtained, the patient was brought into the operating room and left in the supine position. Managed anesthesia care was administered. The patient's upper chest and neck area were prepped and draped in the routine fashion. The left subclavian vein was accessed. Venous blood return was obtained. Guidewire was then advanced under the guidance of fluoroscopy followed by a dilator introducer sheath. Then the catheter was advanced along the introducer sheath under the guidance of fluoroscopy. At the same time, a pocket was made at the left upper chest.  The catheter was tunneled through the pocket, connected to a reservoir. Both the reservoir and the catheter were flushed with heparin solution 100 units/mL with good aspiration and good flush. Then the port was sutured into place with 2-0 Prolene stitch. Incision was closed with 3-0 Vicryl stitch on the dermal layer, 4-0 Vicryl stitch in subcuticular fashion. The patient tolerated the procedure well, transferred to the recovery room in stable condition. All the instrument count and lap count were correct.   Estimated blood loss was minimal.      Tori Hanson MD      [id number]  D: 07/01/2021 10:59  T:  07/01/2021 19:42  JOB #:  [id number]

## 2021-07-01 NOTE — ANESTHESIA POSTPROCEDURE EVALUATION
[id number]
DR HOWARD CALLED PT STATED HER PAIN IS 7/10. WILL RE MEDICATE Procedure(s): INFUSAPORT INSERTION. total IV anesthesia    Anesthesia Post Evaluation      Multimodal analgesia: multimodal analgesia used between 6 hours prior to anesthesia start to PACU discharge  Patient location during evaluation: bedside  Patient participation: complete - patient participated  Level of consciousness: awake and alert  Pain score: 1  Pain management: adequate  Airway patency: patent  Anesthetic complications: no  Cardiovascular status: acceptable  Respiratory status: acceptable  Hydration status: acceptable  Comments: Pt doing well. Ok to d/c home. Post anesthesia nausea and vomiting:  none  Final Post Anesthesia Temperature Assessment:  Normothermia (36.0-37.5 degrees C)      INITIAL Post-op Vital signs:   Vitals Value Taken Time   /61 07/01/21 0856   Temp 36.7 °C (98 °F) 07/01/21 0856   Pulse 70 07/01/21 0858   Resp 16 07/01/21 0856   SpO2 96 % 07/01/21 0858   Vitals shown include unvalidated device data.

## 2021-07-01 NOTE — DISCHARGE INSTRUCTIONS
Remove plastic dressing and gauze after 48 hours, leave sterile strips on for 7-10 days, OK to shower    MEDICATION INTERACTION:  During your procedure you potentially received a medication or medications which may reduce the effectiveness of oral contraceptives. Please consider other forms of contraception for 1 month following your procedure if you are currently using oral contraceptives as your primary form of birth control. In addition to this, we recommend continuing your oral contraceptive as prescribed, unless otherwise instructed by your physician, during this time    After general anesthesia or intravenous sedation, for 24 hours or while taking prescription Narcotics:  · Limit your activities  · A responsible adult needs to be with you for the next 24 hours  · Do not drive and operate hazardous machinery  · Do not make important personal or business decisions  · Do not drink alcoholic beverages  · If you have not urinated within 8 hours after discharge, and you are experiencing discomfort from urinary retention, please go to the nearest ED. · If you have sleep apnea and have a CPAP machine, please use it for all naps and sleeping. · Please use caution when taking narcotics and any of your home medications that may cause drowsiness. *  Please give a list of your current medications to your Primary Care Provider. *  Please update this list whenever your medications are discontinued, doses are      changed, or new medications (including over-the-counter products) are added. *  Please carry medication information at all times in case of emergency situations. These are general instructions for a healthy lifestyle:  No smoking/ No tobacco products/ Avoid exposure to second hand smoke  Surgeon General's Warning:  Quitting smoking now greatly reduces serious risk to your health.   Obesity, smoking, and sedentary lifestyle greatly increases your risk for illness  A healthy diet, regular physical exercise & weight monitoring are important for maintaining a healthy lifestyle    You may be retaining fluid if you have a history of heart failure or if you experience any of the following symptoms:  Weight gain of 3 pounds or more overnight or 5 pounds in a week, increased swelling in our hands or feet or shortness of breath while lying flat in bed. Please call your doctor as soon as you notice any of these symptoms; do not wait until your next office visit.

## 2021-07-01 NOTE — BRIEF OP NOTE
Brief Postoperative Note    Patient: Trey Cross  YOB: 1945  MRN: [id number]    Date of Procedure: 7/1/2021     Pre-Op Diagnosis: Malignant neoplasm of body of pancreas ([address] [C25.1]    Post-Op Diagnosis: Same as preoperative diagnosis. Procedure(s): INFUSAPORT INSERTION with fluoro    Surgeon(s): Rosangela Cox MD    Surgical Assistant: None    Anesthesia: MAC     Estimated Blood Loss (mL): Minimal    Complications: None    Specimens: * No specimens in log *     Implants:   Implant Name Type Inv.  Item Serial No.  Lot No. LRB No. Used Action   PORT INFUS 8FR PLAS ATTCH OPN END POLYUR CATH MONIKA FILL SUT - [id number]  PORT INFUS 8FR PLAS ATTCH OPN END POLYUR CATH MONIKA FILL SUT  [employer] [id number] Left 1 Implanted       Drains: * No LDAs found *    Findings: fluoro confirmed positioning    Electronically Signed by Jameel Coppola MD on 7/1/2021 at 8:25 AM

## 2021-07-01 NOTE — PERIOP NOTES
Pt and  Reita Show given discharge instructions at bedside, both verbalize understanding. All questions answered.

## 2021-07-01 NOTE — ANESTHESIA PREPROCEDURE EVALUATION
Relevant Problems   CARDIOVASCULAR   (+) Essential (primary) hypertension      GASTROINTESTINAL   (+) Gastroesophageal reflux disease without esophagitis      ENDOCRINE   (+) Diabetes 1.5, managed as type 2 (HCC)      PERSONAL HX & FAMILY HX OF CANCER   (+) Malignant neoplasm of body of pancreas (HCC)       Anesthetic History   No history of anesthetic complications            Review of Systems / Medical History  Patient summary reviewed and pertinent labs reviewed    Pulmonary                Comments: L pleural effusion   Neuro/Psych         Psychiatric history (Depression)     Cardiovascular    Hypertension: well controlled        Dysrhythmias (Per pt takes Flecainde and Atenolol to control) : SVT  Hyperlipidemia    Exercise tolerance: >4 METS  Comments: Pt denies CP or SOB   GI/Hepatic/Renal     GERD: well controlled           Endo/Other    Diabetes: poorly controlled, type 2    Cancer (Pancreatic)     Other Findings   Comments: Pt has had 2 incidences of desaturation during EGDs and colonoscopy. Will plan for TIVA but low threshold to proceed with GETA as needed.             Physical Exam    Airway  Mallampati: II  TM Distance: 4 - 6 cm  Neck ROM: normal range of motion   Mouth opening: Normal     Cardiovascular    Rhythm: regular  Rate: normal         Dental    Dentition: Caps/crowns     Pulmonary  Breath sounds clear to auscultation               Abdominal  GI exam deferred       Other Findings            Anesthetic Plan    ASA: 3  Anesthesia type: total IV anesthesia          Induction: Intravenous  Anesthetic plan and risks discussed with: Patient

## 2021-07-01 NOTE — H&P
H&P/Consult Note/Progress Note/Office Note:   Melissa Seo  MRN: [id number]  :1945  Age:75 y.o.    HPI: Melissa Seo is a 77 y.o. female who has new diagnosis of pancreatic cancer, needs a port for chemo. Past Medical History:   Diagnosis Date    Adverse effect of anesthesia     Oxygen dropped during EUS and Colonoscopy    Arrhythmia     AT/SVT;  Followed by Dr. Thomason-Cardiologist, medication    COVID-19 vaccine series completed 2021    moderna vaccine, 2nd dose 3/6/21  documentation needed    Depressed     managed with med    GERD (gastroesophageal reflux disease)     managed with med    Hypercholesterolemia     managed with med    Hypertension     managed with med    IPMN (intraductal papillary mucinous neoplasm)     Pancreatic cyst     Pancreatic cyst     surgery     Type 2 diabetes mellitus ([facility] [address]     oral agents, FBS 200s, denies hypoglycemia     Past Surgical History:   Procedure Laterality Date    HX BREAST BIOPSY Right     per pt was fatty tissue    HX CHOLECYSTECTOMY  2021    removed part of pancreas and spleen    HX COLONOSCOPY      HX ENDOSCOPY      HX GYN      uterine fibroid    HX TONSILLECTOMY      OK ABDOMEN SURGERY PROC UNLISTED      removed part of pancreas and spleen     Current Facility-Administered Medications   Medication Dose Route Frequency    lidocaine (XYLOCAINE) 10 mg/mL (1 %) injection 0.1 mL  0.1 mL SubCUTAneous PRN    lactated Ringers infusion  100 mL/hr IntraVENous CONTINUOUS    fentaNYL citrate (PF) injection 100 mcg  100 mcg IntraVENous ONCE    midazolam (VERSED) injection 2 mg  2 mg IntraVENous ONCE PRN    midazolam (VERSED) injection 2 mg  2 mg IntraVENous ONCE    ceFAZolin (ANCEF) 2 g/20 mL in sterile water IV syringe  2 g IntraVENous ONCE    sodium chloride (NS) flush 5-40 mL  5-40 mL IntraVENous Q8H    sodium chloride (NS) flush 5-40 mL  5-40 mL IntraVENous PRN     Lisinopril  Social History Socioeconomic History    Marital status:      Spouse name: Not on file    Number of children: Not on file    Years of education: Not on file    Highest education level: Not on file   Tobacco Use    Smoking status: Never Smoker    Smokeless tobacco: Never Used   Vaping Use    Vaping Use: Never used   Substance and Sexual Activity    Alcohol use: Yes     Alcohol/week: 1.0 standard drinks     Types: 1 Glasses of wine per week    Drug use: Never     Social Determinants of Health     Financial Resource Strain:     Difficulty of Paying Living Expenses:    Food Insecurity:     Worried About Running Out of Food in the Last Year:     [address] in the Last Year:    Transportation Needs:     Lack of Transportation (Medical):  Lack of Transportation (Non-Medical):    Physical Activity:     Days of Exercise per Week:     Minutes of Exercise per Session:    Stress:     Feeling of Stress :    Social Connections:     Frequency of Communication with Friends and Family:     Frequency of Social Gatherings with Friends and Family:     Attends Zoroastrian Services:     Active Member of Clubs or Organizations:     Attends Club or Organization Meetings:     Marital Status:      Social History     Tobacco Use   Smoking Status Never Smoker   Smokeless Tobacco Never Used     History reviewed. No pertinent family history. ROS: The patient has no difficulty with chest pain or shortness of breath. No fever or chills. Comprehensive review of systems was otherwise unremarkable except as noted above.     Physical Exam:   Visit Vitals  BP (!) 174/69 (BP 1 Location: Right arm, BP Patient Position: At rest)   Pulse 71   Temp 98.6 °F (37 °C)   Resp 16   Ht 5' (1.524 m)   Wt 132 lb 12.8 oz (60.2 kg)   SpO2 93%   BMI 25.94 kg/m²     Vitals:    07/01/21 0628 07/01/21 0634   BP:  (!) 174/69   Pulse:  71   Resp:  16   Temp:  98.6 °F (37 °C)   SpO2:  93%   Weight: 132 lb 12.8 oz (60.2 kg)    Height: 5' (1.524 m) No intake/output data recorded. No intake/output data recorded. Constitutional: Alert, oriented, cooperative patient in no acute distress; appears stated age    Eyes:Sclera are clear. EOMs intact  ENMT: no external lesions gross hearing normal; no obvious neck masses, no ear or lip lesions, nares normal  CV: RRR. Normal perfusion  Resp: No JVD. Breathing is  non-labored; no audible wheezing. GI: soft and non-distended     Musculoskeletal: unremarkable with normal function. No embolic signs or cyanosis. Neuro:  Oriented; moves all 4; no focal deficits  Psychiatric: normal affect and mood, no memory impairment    Recent vitals (if inpt):  Patient Vitals for the past 24 hrs:   BP Temp Pulse Resp SpO2 Height Weight   07/01/21 0634 (!) 174/69 98.6 °F (37 °C) 71 16 93 % -- --   07/01/21 2278 -- -- -- -- -- 5' (1.524 m) 132 lb 12.8 oz (60.2 kg)       Amount and/or Complexity of Data Reviewed and Analyzed:  I reviewed and analyzed all of the unique labs and radiologic studies that are shown below as well as any that are in the HPI, and any that are in the expanded problem list below  *Each unique test, order, or document contributes to the combination of 2 or combination of 3 in Category 1 below. For this visit I also reviewed old records and prior notes. No results for input(s): WBC, HGB, PLT, NA, K, CL, CO2, BUN, CREA, GLU, PTP, INR, APTT, TBIL, TBILI, CBIL, ALT, AP, AML, AML, LPSE, LCAD, NH4, TROPT, TROIQ, PCO2, PO2, HCO3, HGBEXT, PLTEXT, INREXT in the last 72 hours.     No lab exists for component: SGOT, GPT,  PH  Review of most recent CBC  Lab Results   Component Value Date/Time    WBC 14.8 (H) 05/10/2021 06:01 AM    HGB 12.3 05/10/2021 06:01 AM    HCT 37.4 05/10/2021 06:01 AM    PLATELET 235 15/29/5155 06:01 AM    MCV 90.6 05/10/2021 06:01 AM       Review of most recent BMP  Lab Results   Component Value Date/Time    Sodium 144 05/10/2021 06:01 AM    Potassium 3.7 05/10/2021 06:01 AM    Chloride 107 05/10/2021 06:01 AM    CO2 33 (H) 05/10/2021 06:01 AM    Anion gap 4 (L) 05/10/2021 06:01 AM    Glucose 154 (H) 05/10/2021 06:01 AM    BUN 9 05/10/2021 06:01 AM    Creatinine 0.66 05/10/2021 06:01 AM    GFR est AA >60 05/10/2021 06:01 AM    GFR est non-AA >60 05/10/2021 06:01 AM    Calcium 8.7 05/10/2021 06:01 AM       Review of most recent LFTs (and lipase if done)  Lab Results   Component Value Date/Time    ALT (SGPT) 33 04/28/2021 02:41 PM    AST (SGOT) 34 04/28/2021 02:41 PM    Alk. phosphatase 88 04/28/2021 02:41 PM    Bilirubin, total 0.9 04/28/2021 02:41 PM     No results found for: LPSE    Lab Results   Component Value Date/Time    INR 1.0 04/28/2021 02:41 PM       Review of most recent HgbA1c  No results found for: HBA1C, UFX4KJUX, TIJ0DBYD    Nutritional assessment screen for wound healing issues:  Lab Results   Component Value Date/Time    Protein, total 7.8 04/28/2021 02:41 PM    Albumin 3.7 04/28/2021 02:41 PM       @lastcovr@  XR Results (most recent):  Results from Hospital Encounter encounter on 06/29/21    XR CHEST PA LAT    Narrative  TWO-VIEW CHEST:    CLINICAL HISTORY: Follow-up distal pancreatectomy with pleural effusion. COMPARISON:  April 28, 2021. FINDINGS: Erect AP and lateral chest images demonstrate small left pleural  effusion with lateral and posterior loculations as well as adjacent left lower  lobe atelectasis/infiltrate. Right lung remains clear. The heart size is  within normal limits without evidence of congestive heart failure or  pneumothorax. The bony thorax appears intact on these views. Impression  SMALL LEFT PLEURAL EFFUSION WITH LATERAL AND POSTERIOR LOCULATIONS  AS WELL AS ADJACENT LOWER LOBE ATELECTASIS/INFILTRATE.       CT Results (most recent):  Results from Hospital Encounter encounter on 05/25/21    CT ABD PELV W CONT    Narrative  CT OF THE ABDOMEN AND PELVIS    INDICATION: Left flank pain post partial pancreatectomy    Multiple axial images were obtained through the abdomen and pelvis. Oral  contrast was used for bowel opacification. 100mL of Isovue 370 intravenous  contrast was used for better evaluation of solid organs and vascular structures. Radiation dose reduction techniques were used for this study. All CT scans  performed at this facility use one or all of the following: Automated exposure  control, adjustment of the mA and/or kVp according to patient's size, iterative  reconstruction. COMPARISON: MRI dated 03/05/2021    FINDINGS:  - LUNG BASES: There is now a large left pleural effusion with associated  complete collapse of the left lower lobe and most of the left lingula. The  right lung is clear.    - LIVER: Normal in size and appearance. - GALLBLADDER/BILE DUCTS: No bile duct dilatation. Gallbladder is collapsed or  absent. - PANCREAS: Post distal pancreatectomy. There is a 4.1 cm fluid collection  contiguous with the surgical margin. There is no air within the collection.  - SPLEEN: Normal.    - ADRENALS: Normal.  - KIDNEYS/URETERS: No hydronephrosis or significant mass. - BLADDER: Normal.  - REPRODUCTIVE ORGANS: No pelvic masses. - BOWEL: Normal caliber. Sigmoid diverticulosis. No inflammatory changes.  - LYMPH NODES: No significant retroperitoneal, mesenteric, or pelvic adenopathy.  - BONES: No fracture or significant bone lesion.  - VASCULATURE: Normal  - OTHER: No ascites. Impression  1.  4 cm fluid collection contiguous with the resection margin in the pancreas,  most likely pseudocyst.  2.  Large left pleural effusion with associated partial atelectasis of the left  lung. If there are any questions about this report, I can be reached on [employer]  or at [phone]    US Results (most recent):  No results found for this or any previous visit. Admission date (for inpatients): 7/1/2021   Day of Surgery  Procedure(s):   INFUSAPORT INSERTION        ASSESSMENT/PLAN:  Problem List  Date Reviewed: 6/10/2021        Codes Class Noted    Pleural effusion on left ICD-10-CM: J90  ICD-9-CM: 511.9  5/28/2021        Suspected sleep apnea ICD-10-CM: R29.818  ICD-9-CM: 781.99  5/28/2021        Pleural effusion ICD-10-CM: J90  ICD-9-CM: 511.9  5/28/2021        Malignant neoplasm of body of pancreas ([facility] [address] ICD-10-CM: C25.1  ICD-9-CM: 157.1  5/18/2021        IPMN (intraductal papillary mucinous neoplasm) ICD-10-CM: D49.0  ICD-9-CM: 239.0  5/5/2021        Diabetes 1.5, managed as type 2 ([facility] [address] ICD-10-CM: E13.9  ICD-9-CM: 250.00  6/20/2020        Carpal tunnel syndrome, bilateral ICD-10-CM: G56.03  ICD-9-CM: 354.0  4/15/2019    Overview Signed 5/28/2021  1:14 PM by Rupal Ceja NP     Last Assessment & Plan:   Formatting of this note might be different from the original.  Encouraged her to wear braces nightly             Mixed hyperlipidemia ICD-10-CM: E78.2  ICD-9-CM: 272.2  1/15/2019    Overview Signed 5/28/2021  1:14 PM by Rupal Ceja NP     Last Assessment & Plan:   Formatting of this note might be different from the original.  Patient non fasting today, will check lipids at next visit. Continue statin. Gastroesophageal reflux disease without esophagitis ICD-10-CM: K21.9  ICD-9-CM: 530.81  5/15/2018        Essential (primary) hypertension ICD-10-CM: I10  ICD-9-CM: 401.9  3/24/2010    Overview Signed 5/28/2021  1:14 PM by Rupal Ceja NP     Last Assessment & Plan:   Formatting of this note might be different from the original.  BP improved, continue current medication regimen. Active Problems:    * No active hospital problems. *         Place port          I have personally performed a face-to-face diagnostic evaluation and management  service on this patient. I have independently seen the patient. I have independently obtained the above history from the patient/family. I have independently examined the patient with above findings.   I have independently reviewed data/labs for this patient and developed the above plan of care (MDM).   Signed: Blessing Aldridge MD, FACS

## 2021-07-09 NOTE — PROGRESS NOTES
I spoke with Ms. Jhonny Lowe regarding her insurance coverage, potential oral medication authorizations, enrollment in the [address] [place] [address] ([facility]) and the [address] [facility] ([address]), and assistance organization resource sheet. Ms. Jhonny Lowe has Medicare and a Plan F Supplement with [employer]. I went over the LPOA with Ms. Jhonny Lowe. She wanted to look it over at home before deciding to sign it or not. Next, I spoke with Ms. Jhonny Lowe regarding potential oral medication authorizations. I told her that if she ever had any problems getting her oral medications filled to give the dedicated [facility] , Zack Florentino, a call. Most of the time, it is simply an authorization that needs to be done with the insurance company. Next, I spoke with Ms. Jhonny Lowe regarding enrolling with [facility] and [facility]. I went over some of the services that [facility] and [facility] offers and the enrollment process. She politely declined ACS enrollment  Next, I gave Ms. Degroot flyers about the free Yoga classes for cancer survivors and the Oncology Massage program.  I let her know that she can get a free 30 minute massage. Lastly, I gave Ms. Degroot a form with various resource organizations that could assist with specific needs (example:  transportation, lodging, preparing meals, home cleaning)     Faxed Physician's Statement to the [address] at [phone]. Phone [phone]. Form scanned into chart. Patient expressed understanding of the information above and all questions were answered to her satisfaction.

## 2021-07-12 ENCOUNTER — HOSPITAL ENCOUNTER (OUTPATIENT)
Dept: LAB | Age: 76
Discharge: HOME | End: 2021-07-12
Payer: MEDICARE

## 2021-07-12 DIAGNOSIS — C25.1 MALIGNANT NEOPLASM OF BODY OF PANCREAS (HCC): ICD-10-CM

## 2021-07-12 LAB
ALBUMIN SERPL-MCNC: 2.6 G/DL (ref 3.2–4.6)
ALBUMIN/GLOB SERPL: 0.5 {RATIO} (ref 1.2–3.5)
ALP SERPL-CCNC: 112 U/L (ref 50–136)
ALT SERPL-CCNC: 51 U/L (ref 12–65)
ANION GAP SERPL CALC-SCNC: 7 MMOL/L (ref 7–16)
AST SERPL-CCNC: 46 U/L (ref 15–37)
BASOPHILS # BLD: 0.1 K/UL (ref 0–0.2)
BASOPHILS NFR BLD: 1 % (ref 0–2)
BILIRUB SERPL-MCNC: 0.3 MG/DL (ref 0.2–1.1)
BUN SERPL-MCNC: 23 MG/DL (ref 8–23)
CALCIUM SERPL-MCNC: 9.3 MG/DL (ref 8.3–10.4)
CANCER AG19-9 SERPL-ACNC: 46.8 U/ML (ref 2–37)
CHLORIDE SERPL-SCNC: 97 MMOL/L (ref 98–107)
CO2 SERPL-SCNC: 28 MMOL/L (ref 21–32)
CREAT SERPL-MCNC: 1.3 MG/DL (ref 0.6–1)
DIFFERENTIAL METHOD BLD: ABNORMAL
EOSINOPHIL # BLD: 0.1 K/UL (ref 0–0.8)
EOSINOPHIL NFR BLD: 1 % (ref 0.5–7.8)
ERYTHROCYTE [DISTWIDTH] IN BLOOD BY AUTOMATED COUNT: 14.2 % (ref 11.9–14.6)
GLOBULIN SER CALC-MCNC: 5.6 G/DL (ref 2.3–3.5)
GLUCOSE SERPL-MCNC: 305 MG/DL (ref 65–100)
HCT VFR BLD AUTO: 36.7 % (ref 35.8–46.3)
HGB BLD-MCNC: 11.3 G/DL (ref 11.7–15.4)
IMM GRANULOCYTES # BLD AUTO: 0.1 K/UL (ref 0–0.5)
IMM GRANULOCYTES NFR BLD AUTO: 1 % (ref 0–5)
LYMPHOCYTES # BLD: 2 K/UL (ref 0.5–4.6)
LYMPHOCYTES NFR BLD: 15 % (ref 13–44)
MAGNESIUM SERPL-MCNC: 1.4 MG/DL (ref 1.8–2.4)
MCH RBC QN AUTO: 26.4 PG (ref 26.1–32.9)
MCHC RBC AUTO-ENTMCNC: 30.8 G/DL (ref 31.4–35)
MCV RBC AUTO: 85.7 FL (ref 79.6–97.8)
MONOCYTES # BLD: 1.5 K/UL (ref 0.1–1.3)
MONOCYTES NFR BLD: 11 % (ref 4–12)
NEUTS SEG # BLD: 9.4 K/UL (ref 1.7–8.2)
NEUTS SEG NFR BLD: 72 % (ref 43–78)
NRBC # BLD: 0 K/UL (ref 0–0.2)
PLATELET # BLD AUTO: 562 K/UL (ref 150–450)
PMV BLD AUTO: 10.1 FL (ref 9.4–12.3)
POTASSIUM SERPL-SCNC: 4.5 MMOL/L (ref 3.5–5.1)
PROT SERPL-MCNC: 8.2 G/DL (ref 6.3–8.2)
RBC # BLD AUTO: 4.28 M/UL (ref 4.05–5.2)
SODIUM SERPL-SCNC: 132 MMOL/L (ref 136–145)
WBC # BLD AUTO: 13.1 K/UL (ref 4.3–11.1)

## 2021-07-12 PROCEDURE — 36415 COLL VENOUS BLD VENIPUNCTURE: CPT

## 2021-07-12 PROCEDURE — 80053 COMPREHEN METABOLIC PANEL: CPT

## 2021-07-12 PROCEDURE — 85025 COMPLETE CBC W/AUTO DIFF WBC: CPT

## 2021-07-12 PROCEDURE — 83735 ASSAY OF MAGNESIUM: CPT

## 2021-07-12 PROCEDURE — 86301 IMMUNOASSAY TUMOR CA 19-9: CPT

## 2021-07-13 ENCOUNTER — HOSPITAL ENCOUNTER (OUTPATIENT)
Dept: INFUSION THERAPY | Age: 76
Discharge: HOME | End: 2021-07-13
Payer: MEDICARE

## 2021-07-13 ENCOUNTER — PATIENT OUTREACH (OUTPATIENT)
Dept: CASE MANAGEMENT | Age: 76
End: 2021-07-13

## 2021-07-13 VITALS
WEIGHT: 130 LBS | BODY MASS INDEX: 25.39 KG/M2 | RESPIRATION RATE: 18 BRPM | OXYGEN SATURATION: 93 % | HEART RATE: 80 BPM | SYSTOLIC BLOOD PRESSURE: 142 MMHG | DIASTOLIC BLOOD PRESSURE: 65 MMHG | TEMPERATURE: 98.3 F

## 2021-07-13 DIAGNOSIS — C25.1 MALIGNANT NEOPLASM OF BODY OF PANCREAS (HCC): Primary | ICD-10-CM

## 2021-07-13 PROCEDURE — 74011000258 HC RX REV CODE- 258: Performed by: INTERNAL MEDICINE

## 2021-07-13 PROCEDURE — 96415 CHEMO IV INFUSION ADDL HR: CPT

## 2021-07-13 PROCEDURE — 96375 TX/PRO/DX INJ NEW DRUG ADDON: CPT

## 2021-07-13 PROCEDURE — 96413 CHEMO IV INFUSION 1 HR: CPT

## 2021-07-13 PROCEDURE — 96417 CHEMO IV INFUS EACH ADDL SEQ: CPT

## 2021-07-13 PROCEDURE — G0498 CHEMO EXTEND IV INFUS W/PUMP: HCPCS

## 2021-07-13 PROCEDURE — 74011250636 HC RX REV CODE- 250/636: Performed by: INTERNAL MEDICINE

## 2021-07-13 PROCEDURE — 96368 THER/DIAG CONCURRENT INF: CPT

## 2021-07-13 PROCEDURE — 96372 THER/PROPH/DIAG INJ SC/IM: CPT

## 2021-07-13 RX ORDER — HYDROCORTISONE SODIUM SUCCINATE 100 MG/2ML
100 INJECTION, POWDER, FOR SOLUTION INTRAMUSCULAR; INTRAVENOUS AS NEEDED
Status: DISPENSED | OUTPATIENT
Start: 2021-07-13 | End: 2021-07-13

## 2021-07-13 RX ORDER — DIPHENHYDRAMINE HYDROCHLORIDE 50 MG/ML
50 INJECTION, SOLUTION INTRAMUSCULAR; INTRAVENOUS AS NEEDED
Status: DISPENSED | OUTPATIENT
Start: 2021-07-13 | End: 2021-07-13

## 2021-07-13 RX ORDER — SODIUM CHLORIDE 0.9 % (FLUSH) 0.9 %
10 SYRINGE (ML) INJECTION AS NEEDED
Status: ACTIVE | OUTPATIENT
Start: 2021-07-13 | End: 2021-07-13

## 2021-07-13 RX ORDER — ATROPINE SULFATE 0.4 MG/ML
0.4 INJECTION, SOLUTION ENDOTRACHEAL; INTRAMEDULLARY; INTRAMUSCULAR; INTRAVENOUS; SUBCUTANEOUS ONCE
Status: COMPLETED | OUTPATIENT
Start: 2021-07-13 | End: 2021-07-13

## 2021-07-13 RX ORDER — DEXTROSE MONOHYDRATE 50 MG/ML
25 INJECTION, SOLUTION INTRAVENOUS CONTINUOUS
Status: ACTIVE | OUTPATIENT
Start: 2021-07-13 | End: 2021-07-13

## 2021-07-13 RX ORDER — ONDANSETRON 2 MG/ML
8 INJECTION INTRAMUSCULAR; INTRAVENOUS ONCE
Status: COMPLETED | OUTPATIENT
Start: 2021-07-13 | End: 2021-07-13

## 2021-07-13 RX ADMIN — ATROPINE SULFATE 0.4 MG: 0.4 INJECTION, SOLUTION INTRAMUSCULAR; INTRAVENOUS; SUBCUTANEOUS at 09:50

## 2021-07-13 RX ADMIN — LEUCOVORIN CALCIUM 644 MG: 350 INJECTION, POWDER, LYOPHILIZED, FOR SOLUTION INTRAMUSCULAR; INTRAVENOUS at 12:10

## 2021-07-13 RX ADMIN — Medication 10 ML: at 08:40

## 2021-07-13 RX ADMIN — FLUOROURACIL 3864 MG: 50 INJECTION, SOLUTION INTRAVENOUS at 13:48

## 2021-07-13 RX ADMIN — DEXAMETHASONE SODIUM PHOSPHATE 12 MG: 4 INJECTION, SOLUTION INTRAMUSCULAR; INTRAVENOUS at 09:00

## 2021-07-13 RX ADMIN — IRINOTECAN HYDROCHLORIDE 193 MG: 20 INJECTION, SOLUTION INTRAVENOUS at 12:10

## 2021-07-13 RX ADMIN — DEXTROSE MONOHYDRATE 25 ML/HR: 5 INJECTION, SOLUTION INTRAVENOUS at 08:40

## 2021-07-13 RX ADMIN — ONDANSETRON 8 MG: 2 INJECTION INTRAMUSCULAR; INTRAVENOUS at 08:59

## 2021-07-13 RX ADMIN — OXALIPLATIN 96.5 MG: 5 INJECTION, SOLUTION INTRAVENOUS at 09:50

## 2021-07-13 NOTE — PROGRESS NOTES
I saw patient on 7-12-21 with Dr Zachery Payne prior to Folfirinox start on 7-13-21. We reviewed possible side effects of chemo and reviewed anti-nausea medications and Emla cream use. Pt verbalized understanding to call with temp >100.4 or any uncontrolled symptoms.  Nurse navigation will be following

## 2021-07-13 NOTE — PROGRESS NOTES
[facility] Assessment completed, labs reviewed. D1C1 Folfirinox completed. Patient tolerated well. Elastomeric chemotherapy pump attached, tubing unobstructed (verified by Winter Godinez, RN) and infusing. Pt given spill kit for home and instructions for use. Any issues or concerns during appointment: No.  Patient aware of next infusion appointment on 07/15/21 @1630. Discharged ambulatory.

## 2021-07-15 ENCOUNTER — HOSPITAL ENCOUNTER (OUTPATIENT)
Dept: INFUSION THERAPY | Age: 76
Discharge: HOME | End: 2021-07-15
Payer: MEDICARE

## 2021-07-15 VITALS
OXYGEN SATURATION: 93 % | HEART RATE: 71 BPM | RESPIRATION RATE: 18 BRPM | SYSTOLIC BLOOD PRESSURE: 115 MMHG | TEMPERATURE: 97.4 F | DIASTOLIC BLOOD PRESSURE: 53 MMHG

## 2021-07-15 DIAGNOSIS — C25.1 MALIGNANT NEOPLASM OF BODY OF PANCREAS (HCC): Primary | ICD-10-CM

## 2021-07-15 LAB
ACID FAST STN SPEC: NEGATIVE
MYCOBACTERIUM SPEC QL CULT: NEGATIVE
SPECIMEN PREPARATION: NORMAL
SPECIMEN SOURCE: NORMAL

## 2021-07-15 PROCEDURE — 96523 IRRIG DRUG DELIVERY DEVICE: CPT

## 2021-07-15 RX ORDER — SODIUM CHLORIDE 0.9 % (FLUSH) 0.9 %
10 SYRINGE (ML) INJECTION AS NEEDED
Status: DISCONTINUED | OUTPATIENT
Start: 2021-07-15 | End: 2021-07-16 | Stop reason: HOSPADM

## 2021-07-15 RX ADMIN — Medication 10 ML: at 16:18

## 2021-07-15 NOTE — PROGRESS NOTES
Arrived to the [facility]. Completed 5FU pump disconnected and patient tolerated well. States nausea is controlled. Any issues or concerns during appointment: none  Patient given education on DC pump process  Aware of lab appointment 7/26/21 at 130pm with office visit to follow  Next infusion appointment is 7/27/21 at [address] patient to notify provider for any issues or worrisome symptoms. They verbalized understanding. Discharged ambulatory with .

## 2021-07-26 ENCOUNTER — PATIENT OUTREACH (OUTPATIENT)
Dept: CASE MANAGEMENT | Age: 76
End: 2021-07-26

## 2021-07-26 ENCOUNTER — HOSPITAL ENCOUNTER (OUTPATIENT)
Dept: LAB | Age: 76
Discharge: HOME | End: 2021-07-26
Payer: MEDICARE

## 2021-07-26 DIAGNOSIS — C25.1 MALIGNANT NEOPLASM OF BODY OF PANCREAS (HCC): ICD-10-CM

## 2021-07-26 LAB
ALBUMIN SERPL-MCNC: 2.7 G/DL (ref 3.2–4.6)
ALBUMIN/GLOB SERPL: 0.5 {RATIO} (ref 1.2–3.5)
ALP SERPL-CCNC: 95 U/L (ref 50–136)
ALT SERPL-CCNC: 43 U/L (ref 12–65)
ANION GAP SERPL CALC-SCNC: 8 MMOL/L (ref 7–16)
AST SERPL-CCNC: 29 U/L (ref 15–37)
BASOPHILS # BLD: 0.1 K/UL (ref 0–0.2)
BASOPHILS NFR BLD: 1 % (ref 0–2)
BILIRUB SERPL-MCNC: 0.3 MG/DL (ref 0.2–1.1)
BUN SERPL-MCNC: 19 MG/DL (ref 8–23)
CALCIUM SERPL-MCNC: 8.8 MG/DL (ref 8.3–10.4)
CANCER AG19-9 SERPL-ACNC: 37.8 U/ML (ref 2–37)
CHLORIDE SERPL-SCNC: 100 MMOL/L (ref 98–107)
CO2 SERPL-SCNC: 25 MMOL/L (ref 21–32)
CREAT SERPL-MCNC: 1.2 MG/DL (ref 0.6–1)
DIFFERENTIAL METHOD BLD: ABNORMAL
EOSINOPHIL # BLD: 0.3 K/UL (ref 0–0.8)
EOSINOPHIL NFR BLD: 5 % (ref 0.5–7.8)
ERYTHROCYTE [DISTWIDTH] IN BLOOD BY AUTOMATED COUNT: 14.7 % (ref 11.9–14.6)
GLOBULIN SER CALC-MCNC: 5.2 G/DL (ref 2.3–3.5)
GLUCOSE SERPL-MCNC: 194 MG/DL (ref 65–100)
HCT VFR BLD AUTO: 35.2 % (ref 35.8–46.3)
HGB BLD-MCNC: 11 G/DL (ref 11.7–15.4)
IMM GRANULOCYTES # BLD AUTO: 0 K/UL (ref 0–0.5)
IMM GRANULOCYTES NFR BLD AUTO: 0 % (ref 0–5)
LYMPHOCYTES # BLD: 4.6 K/UL (ref 0.5–4.6)
LYMPHOCYTES NFR BLD: 71 % (ref 13–44)
MAGNESIUM SERPL-MCNC: 1.5 MG/DL (ref 1.8–2.4)
MCH RBC QN AUTO: 25.9 PG (ref 26.1–32.9)
MCHC RBC AUTO-ENTMCNC: 31.3 G/DL (ref 31.4–35)
MCV RBC AUTO: 83 FL (ref 79.6–97.8)
MONOCYTES # BLD: 1.3 K/UL (ref 0.1–1.3)
MONOCYTES NFR BLD: 19 % (ref 4–12)
NEUTS SEG # BLD: 0.3 K/UL (ref 1.7–8.2)
NEUTS SEG NFR BLD: 4 % (ref 43–78)
NRBC # BLD: 0 K/UL (ref 0–0.2)
PLATELET # BLD AUTO: 285 K/UL (ref 150–450)
PLATELET COMMENTS,PCOM: ADEQUATE
PMV BLD AUTO: 10.3 FL (ref 9.4–12.3)
POTASSIUM SERPL-SCNC: 3.6 MMOL/L (ref 3.5–5.1)
PROT SERPL-MCNC: 7.9 G/DL (ref 6.3–8.2)
RBC # BLD AUTO: 4.24 M/UL (ref 4.05–5.2)
RBC MORPH BLD: ABNORMAL
SODIUM SERPL-SCNC: 133 MMOL/L (ref 136–145)
WBC # BLD AUTO: 6.6 K/UL (ref 4.3–11.1)
WBC MORPH BLD: ABNORMAL

## 2021-07-26 PROCEDURE — 86301 IMMUNOASSAY TUMOR CA 19-9: CPT

## 2021-07-26 PROCEDURE — 83735 ASSAY OF MAGNESIUM: CPT

## 2021-07-26 PROCEDURE — 80053 COMPREHEN METABOLIC PANEL: CPT

## 2021-07-26 PROCEDURE — 36415 COLL VENOUS BLD VENIPUNCTURE: CPT

## 2021-07-26 PROCEDURE — 85025 COMPLETE CBC W/AUTO DIFF WBC: CPT

## 2021-07-26 NOTE — PROGRESS NOTES
7/26/21 saw pt today with [place], NP for pre chemo cycle 2 FOLFIRINOX. Will delay 1 week due to low ANC. She is reporting some issues with excess gas and diarrhea with cycle 1. Discussed management - hold boost, medication management, start pepcid, etc.  Also reporting issues with left foot weakness - referral to neurology and rx for rolling walker. PO intake is adequate. Follow up in 1 week. Encouraged to call with any concerns. Navigation will continue to follow.

## 2021-07-27 ENCOUNTER — HOSPITAL ENCOUNTER (OUTPATIENT)
Dept: INFUSION THERAPY | Age: 76
End: 2021-07-27

## 2021-07-29 ENCOUNTER — PATIENT OUTREACH (OUTPATIENT)
Dept: CASE MANAGEMENT | Age: 76
End: 2021-07-29

## 2021-07-29 NOTE — PROGRESS NOTES
Patient phoned to see if she could take biotin because she is losing her hair. She also had some questions regarding medication for gout. She has never taken anything for gout before. But feels as though her big toe is getting more swollen and painful. She has an o/v on Monday 08/02. Advised patient that Dr Shruti Roque would be back in the office tomorrow and we could discuss it then and get back to her. Or we could wait and discuss it on Monday. Encouraged to call with questions. Navigation will continue to follow.

## 2021-08-02 ENCOUNTER — HOSPITAL ENCOUNTER (OUTPATIENT)
Dept: LAB | Age: 76
Discharge: HOME | End: 2021-08-02
Payer: MEDICARE

## 2021-08-02 ENCOUNTER — PATIENT OUTREACH (OUTPATIENT)
Dept: CASE MANAGEMENT | Age: 76
End: 2021-08-02

## 2021-08-02 DIAGNOSIS — C25.1 MALIGNANT NEOPLASM OF BODY OF PANCREAS (HCC): ICD-10-CM

## 2021-08-02 LAB
ALBUMIN SERPL-MCNC: 2.6 G/DL (ref 3.2–4.6)
ALBUMIN/GLOB SERPL: 0.5 {RATIO} (ref 1.2–3.5)
ALP SERPL-CCNC: 103 U/L (ref 50–136)
ALT SERPL-CCNC: 26 U/L (ref 12–65)
ANION GAP SERPL CALC-SCNC: 5 MMOL/L (ref 7–16)
AST SERPL-CCNC: 25 U/L (ref 15–37)
BASOPHILS # BLD: 0.2 K/UL (ref 0–0.2)
BASOPHILS NFR BLD: 1 % (ref 0–2)
BILIRUB SERPL-MCNC: 0.3 MG/DL (ref 0.2–1.1)
BUN SERPL-MCNC: 13 MG/DL (ref 8–23)
CALCIUM SERPL-MCNC: 8.5 MG/DL (ref 8.3–10.4)
CHLORIDE SERPL-SCNC: 103 MMOL/L (ref 98–107)
CO2 SERPL-SCNC: 29 MMOL/L (ref 21–32)
CREAT SERPL-MCNC: 1.1 MG/DL (ref 0.6–1)
DIFFERENTIAL METHOD BLD: ABNORMAL
EOSINOPHIL # BLD: 0.2 K/UL (ref 0–0.8)
EOSINOPHIL NFR BLD: 1 % (ref 0.5–7.8)
ERYTHROCYTE [DISTWIDTH] IN BLOOD BY AUTOMATED COUNT: 15.8 % (ref 11.9–14.6)
GLOBULIN SER CALC-MCNC: 5.2 G/DL (ref 2.3–3.5)
GLUCOSE SERPL-MCNC: 187 MG/DL (ref 65–100)
HCT VFR BLD AUTO: 35.3 % (ref 35.8–46.3)
HGB BLD-MCNC: 11.1 G/DL (ref 11.7–15.4)
IMM GRANULOCYTES # BLD AUTO: 0.4 K/UL (ref 0–0.5)
IMM GRANULOCYTES NFR BLD AUTO: 2 % (ref 0–5)
LYMPHOCYTES # BLD: 8 K/UL (ref 0.5–4.6)
LYMPHOCYTES NFR BLD: 40 % (ref 13–44)
MAGNESIUM SERPL-MCNC: 1.2 MG/DL (ref 1.8–2.4)
MCH RBC QN AUTO: 26.2 PG (ref 26.1–32.9)
MCHC RBC AUTO-ENTMCNC: 31.4 G/DL (ref 31.4–35)
MCV RBC AUTO: 83.5 FL (ref 79.6–97.8)
MONOCYTES # BLD: 2.6 K/UL (ref 0.1–1.3)
MONOCYTES NFR BLD: 13 % (ref 4–12)
NEUTS SEG # BLD: 8.5 K/UL (ref 1.7–8.2)
NEUTS SEG NFR BLD: 43 % (ref 43–78)
NRBC # BLD: 0.02 K/UL (ref 0–0.2)
PLATELET # BLD AUTO: 582 K/UL (ref 150–450)
PLATELET COMMENTS,PCOM: ABNORMAL
PMV BLD AUTO: 9.9 FL (ref 9.4–12.3)
POTASSIUM SERPL-SCNC: 3.8 MMOL/L (ref 3.5–5.1)
PROT SERPL-MCNC: 7.8 G/DL (ref 6.3–8.2)
RBC # BLD AUTO: 4.23 M/UL (ref 4.05–5.2)
RBC MORPH BLD: ABNORMAL
SODIUM SERPL-SCNC: 137 MMOL/L (ref 136–145)
WBC # BLD AUTO: 19.9 K/UL (ref 4.3–11.1)
WBC MORPH BLD: ABNORMAL

## 2021-08-02 PROCEDURE — 85025 COMPLETE CBC W/AUTO DIFF WBC: CPT

## 2021-08-02 PROCEDURE — 80053 COMPREHEN METABOLIC PANEL: CPT

## 2021-08-02 PROCEDURE — 83735 ASSAY OF MAGNESIUM: CPT

## 2021-08-02 PROCEDURE — 36415 COLL VENOUS BLD VENIPUNCTURE: CPT

## 2021-08-02 NOTE — PROGRESS NOTES
8/2/21 saw pt today with Barbara 149, NP for pre chemo cycle 2 FOLFIRINOX. She is reporting gout in her left foot, meds will be sent to pharmacy. PO intake is adequate but discussed importance of protein, calories, and hydration. Infusion tomorrow. Pt has not heard about neurology consult, will have  check on this. Follow up in 2 weeks. Encouraged to call with any concerns. Navigation will continue to follow.

## 2021-08-03 ENCOUNTER — HOSPITAL ENCOUNTER (OUTPATIENT)
Dept: INFUSION THERAPY | Age: 76
Discharge: HOME | End: 2021-08-03
Payer: MEDICARE

## 2021-08-03 VITALS — BODY MASS INDEX: 24.96 KG/M2 | WEIGHT: 127.8 LBS

## 2021-08-03 DIAGNOSIS — C25.1 MALIGNANT NEOPLASM OF BODY OF PANCREAS (HCC): Primary | ICD-10-CM

## 2021-08-03 PROCEDURE — 96375 TX/PRO/DX INJ NEW DRUG ADDON: CPT

## 2021-08-03 PROCEDURE — 96372 THER/PROPH/DIAG INJ SC/IM: CPT

## 2021-08-03 PROCEDURE — 74011000258 HC RX REV CODE- 258: Performed by: NURSE PRACTITIONER

## 2021-08-03 PROCEDURE — 96417 CHEMO IV INFUS EACH ADDL SEQ: CPT

## 2021-08-03 PROCEDURE — 96368 THER/DIAG CONCURRENT INF: CPT

## 2021-08-03 PROCEDURE — 96415 CHEMO IV INFUSION ADDL HR: CPT

## 2021-08-03 PROCEDURE — 74011250636 HC RX REV CODE- 250/636: Performed by: NURSE PRACTITIONER

## 2021-08-03 PROCEDURE — 96413 CHEMO IV INFUSION 1 HR: CPT

## 2021-08-03 PROCEDURE — G0498 CHEMO EXTEND IV INFUS W/PUMP: HCPCS

## 2021-08-03 RX ORDER — ATROPINE SULFATE 0.4 MG/ML
0.4 INJECTION, SOLUTION ENDOTRACHEAL; INTRAMEDULLARY; INTRAMUSCULAR; INTRAVENOUS; SUBCUTANEOUS ONCE
Status: COMPLETED | OUTPATIENT
Start: 2021-08-03 | End: 2021-08-03

## 2021-08-03 RX ORDER — SODIUM CHLORIDE 0.9 % (FLUSH) 0.9 %
10 SYRINGE (ML) INJECTION AS NEEDED
Status: ACTIVE | OUTPATIENT
Start: 2021-08-03 | End: 2021-08-03

## 2021-08-03 RX ORDER — ONDANSETRON 2 MG/ML
8 INJECTION INTRAMUSCULAR; INTRAVENOUS ONCE
Status: COMPLETED | OUTPATIENT
Start: 2021-08-03 | End: 2021-08-03

## 2021-08-03 RX ORDER — DEXTROSE MONOHYDRATE 50 MG/ML
25 INJECTION, SOLUTION INTRAVENOUS CONTINUOUS
Status: ACTIVE | OUTPATIENT
Start: 2021-08-03 | End: 2021-08-03

## 2021-08-03 RX ADMIN — DEXAMETHASONE SODIUM PHOSPHATE 12 MG: 4 INJECTION, SOLUTION INTRAMUSCULAR; INTRAVENOUS at 08:00

## 2021-08-03 RX ADMIN — Medication 10 ML: at 12:05

## 2021-08-03 RX ADMIN — ONDANSETRON 8 MG: 2 INJECTION INTRAMUSCULAR; INTRAVENOUS at 07:55

## 2021-08-03 RX ADMIN — ATROPINE SULFATE 0.4 MG: 0.4 INJECTION, SOLUTION INTRAMUSCULAR; INTRAVENOUS; SUBCUTANEOUS at 10:23

## 2021-08-03 RX ADMIN — DEXTROSE MONOHYDRATE 25 ML/HR: 5 INJECTION, SOLUTION INTRAVENOUS at 08:15

## 2021-08-03 RX ADMIN — IRINOTECAN HYDROCHLORIDE 193 MG: 20 INJECTION, SOLUTION INTRAVENOUS at 10:35

## 2021-08-03 RX ADMIN — FLUOROURACIL 3864 MG: 50 INJECTION, SOLUTION INTRAVENOUS at 12:03

## 2021-08-03 RX ADMIN — LEUCOVORIN CALCIUM 644 MG: 350 INJECTION, POWDER, LYOPHILIZED, FOR SOLUTION INTRAMUSCULAR; INTRAVENOUS at 10:35

## 2021-08-03 RX ADMIN — OXALIPLATIN 96.5 MG: 5 INJECTION, SOLUTION INTRAVENOUS at 08:34

## 2021-08-03 NOTE — PROGRESS NOTES
Pt arrived ambulatory, folfirinox completed, pt tolerated well, 5fu chemo pump connected and unclamped, pt discharged home aware of appointment at

## 2021-08-05 ENCOUNTER — HOSPITAL ENCOUNTER (OUTPATIENT)
Dept: INFUSION THERAPY | Age: 76
Discharge: HOME | End: 2021-08-05
Payer: MEDICARE

## 2021-08-05 VITALS
OXYGEN SATURATION: 94 % | DIASTOLIC BLOOD PRESSURE: 55 MMHG | TEMPERATURE: 97.8 F | RESPIRATION RATE: 16 BRPM | HEART RATE: 63 BPM | SYSTOLIC BLOOD PRESSURE: 110 MMHG

## 2021-08-05 PROCEDURE — 96523 IRRIG DRUG DELIVERY DEVICE: CPT

## 2021-08-05 RX ORDER — SODIUM CHLORIDE 0.9 % (FLUSH) 0.9 %
10 SYRINGE (ML) INJECTION EVERY 8 HOURS
Status: ACTIVE | OUTPATIENT
Start: 2021-08-05

## 2021-08-05 NOTE — PROGRESS NOTES
Arrived to the infusion center. Adrucil pump dc/d. C/o diarrhea--taking Immodium. Aware of her next appointment for chemo on 8/17 at 0830. Disscharged ambulatory in satisfactory condition.

## 2021-08-16 ENCOUNTER — HOSPITAL ENCOUNTER (OUTPATIENT)
Dept: LAB | Age: 76
Discharge: HOME | End: 2021-08-16
Payer: MEDICARE

## 2021-08-16 ENCOUNTER — PATIENT OUTREACH (OUTPATIENT)
Dept: CASE MANAGEMENT | Age: 76
End: 2021-08-16

## 2021-08-16 DIAGNOSIS — C25.1 MALIGNANT NEOPLASM OF BODY OF PANCREAS (HCC): ICD-10-CM

## 2021-08-16 LAB
ALBUMIN SERPL-MCNC: 2.6 G/DL (ref 3.2–4.6)
ALBUMIN/GLOB SERPL: 0.5 {RATIO} (ref 1.2–3.5)
ALP SERPL-CCNC: 91 U/L (ref 50–136)
ALT SERPL-CCNC: 29 U/L (ref 12–65)
ANION GAP SERPL CALC-SCNC: 5 MMOL/L (ref 7–16)
AST SERPL-CCNC: 28 U/L (ref 15–37)
BASOPHILS # BLD: 0 K/UL (ref 0–0.2)
BASOPHILS NFR BLD: 0 % (ref 0–2)
BILIRUB SERPL-MCNC: 0.3 MG/DL (ref 0.2–1.1)
BUN SERPL-MCNC: 12 MG/DL (ref 8–23)
CALCIUM SERPL-MCNC: 8.9 MG/DL (ref 8.3–10.4)
CHLORIDE SERPL-SCNC: 104 MMOL/L (ref 98–107)
CO2 SERPL-SCNC: 28 MMOL/L (ref 21–32)
CREAT SERPL-MCNC: 1 MG/DL (ref 0.6–1)
DIFFERENTIAL METHOD BLD: ABNORMAL
EOSINOPHIL # BLD: 0.2 K/UL (ref 0–0.8)
EOSINOPHIL NFR BLD: 1 % (ref 0.5–7.8)
ERYTHROCYTE [DISTWIDTH] IN BLOOD BY AUTOMATED COUNT: 17 % (ref 11.9–14.6)
GLOBULIN SER CALC-MCNC: 5.4 G/DL (ref 2.3–3.5)
GLUCOSE SERPL-MCNC: 155 MG/DL (ref 65–100)
HCT VFR BLD AUTO: 32.4 % (ref 35.8–46.3)
HGB BLD-MCNC: 10.1 G/DL (ref 11.7–15.4)
IMM GRANULOCYTES # BLD AUTO: 0 K/UL (ref 0–0.5)
IMM GRANULOCYTES NFR BLD AUTO: 0 % (ref 0–5)
LYMPHOCYTES # BLD: 9.3 K/UL (ref 0.5–4.6)
LYMPHOCYTES NFR BLD: 63 % (ref 13–44)
MAGNESIUM SERPL-MCNC: 1.5 MG/DL (ref 1.8–2.4)
MCH RBC QN AUTO: 25.8 PG (ref 26.1–32.9)
MCHC RBC AUTO-ENTMCNC: 31.2 G/DL (ref 31.4–35)
MCV RBC AUTO: 82.7 FL (ref 79.6–97.8)
MONOCYTES # BLD: 1.4 K/UL (ref 0.1–1.3)
MONOCYTES NFR BLD: 9 % (ref 4–12)
NEUTS SEG # BLD: 4.1 K/UL (ref 1.7–8.2)
NEUTS SEG NFR BLD: 27 % (ref 43–78)
NRBC # BLD: 0 K/UL (ref 0–0.2)
PLATELET # BLD AUTO: 355 K/UL (ref 150–450)
PMV BLD AUTO: 9.4 FL (ref 9.4–12.3)
POTASSIUM SERPL-SCNC: 3.7 MMOL/L (ref 3.5–5.1)
PROT SERPL-MCNC: 8 G/DL (ref 6.3–8.2)
RBC # BLD AUTO: 3.92 M/UL (ref 4.05–5.2)
SODIUM SERPL-SCNC: 137 MMOL/L (ref 136–145)
WBC # BLD AUTO: 15 K/UL (ref 4.3–11.1)

## 2021-08-16 PROCEDURE — 85025 COMPLETE CBC W/AUTO DIFF WBC: CPT

## 2021-08-16 PROCEDURE — 83735 ASSAY OF MAGNESIUM: CPT

## 2021-08-16 PROCEDURE — 80053 COMPREHEN METABOLIC PANEL: CPT

## 2021-08-16 PROCEDURE — 36415 COLL VENOUS BLD VENIPUNCTURE: CPT

## 2021-08-17 ENCOUNTER — HOSPITAL ENCOUNTER (OUTPATIENT)
Dept: INFUSION THERAPY | Age: 76
Discharge: HOME | End: 2021-08-17
Payer: MEDICARE

## 2021-08-17 VITALS
DIASTOLIC BLOOD PRESSURE: 70 MMHG | WEIGHT: 124.2 LBS | SYSTOLIC BLOOD PRESSURE: 132 MMHG | OXYGEN SATURATION: 94 % | TEMPERATURE: 97.4 F | RESPIRATION RATE: 18 BRPM | HEART RATE: 82 BPM | BODY MASS INDEX: 24.26 KG/M2

## 2021-08-17 DIAGNOSIS — C25.1 MALIGNANT NEOPLASM OF BODY OF PANCREAS (HCC): Primary | ICD-10-CM

## 2021-08-17 PROCEDURE — 74011000258 HC RX REV CODE- 258: Performed by: NURSE PRACTITIONER

## 2021-08-17 PROCEDURE — 96375 TX/PRO/DX INJ NEW DRUG ADDON: CPT

## 2021-08-17 PROCEDURE — 96415 CHEMO IV INFUSION ADDL HR: CPT

## 2021-08-17 PROCEDURE — 96413 CHEMO IV INFUSION 1 HR: CPT

## 2021-08-17 PROCEDURE — 96417 CHEMO IV INFUS EACH ADDL SEQ: CPT

## 2021-08-17 PROCEDURE — 96368 THER/DIAG CONCURRENT INF: CPT

## 2021-08-17 PROCEDURE — G0498 CHEMO EXTEND IV INFUS W/PUMP: HCPCS

## 2021-08-17 PROCEDURE — 96367 TX/PROPH/DG ADDL SEQ IV INF: CPT

## 2021-08-17 PROCEDURE — 74011250636 HC RX REV CODE- 250/636: Performed by: NURSE PRACTITIONER

## 2021-08-17 PROCEDURE — 96372 THER/PROPH/DIAG INJ SC/IM: CPT

## 2021-08-17 RX ORDER — SODIUM CHLORIDE 0.9 % (FLUSH) 0.9 %
10 SYRINGE (ML) INJECTION AS NEEDED
Status: ACTIVE | OUTPATIENT
Start: 2021-08-17 | End: 2021-08-17

## 2021-08-17 RX ORDER — ATROPINE SULFATE 0.4 MG/ML
0.4 INJECTION, SOLUTION ENDOTRACHEAL; INTRAMEDULLARY; INTRAMUSCULAR; INTRAVENOUS; SUBCUTANEOUS ONCE
Status: COMPLETED | OUTPATIENT
Start: 2021-08-17 | End: 2021-08-17

## 2021-08-17 RX ORDER — MAGNESIUM SULFATE HEPTAHYDRATE 40 MG/ML
2 INJECTION, SOLUTION INTRAVENOUS ONCE
Status: COMPLETED | OUTPATIENT
Start: 2021-08-17 | End: 2021-08-17

## 2021-08-17 RX ORDER — ONDANSETRON 2 MG/ML
8 INJECTION INTRAMUSCULAR; INTRAVENOUS ONCE
Status: COMPLETED | OUTPATIENT
Start: 2021-08-17 | End: 2021-08-17

## 2021-08-17 RX ORDER — DEXTROSE MONOHYDRATE 50 MG/ML
25 INJECTION, SOLUTION INTRAVENOUS CONTINUOUS
Status: ACTIVE | OUTPATIENT
Start: 2021-08-17 | End: 2021-08-17

## 2021-08-17 RX ORDER — HYDROCORTISONE SODIUM SUCCINATE 100 MG/2ML
100 INJECTION, POWDER, FOR SOLUTION INTRAMUSCULAR; INTRAVENOUS AS NEEDED
Status: DISPENSED | OUTPATIENT
Start: 2021-08-17 | End: 2021-08-17

## 2021-08-17 RX ORDER — DIPHENHYDRAMINE HYDROCHLORIDE 50 MG/ML
25 INJECTION, SOLUTION INTRAMUSCULAR; INTRAVENOUS AS NEEDED
Status: DISPENSED | OUTPATIENT
Start: 2021-08-17 | End: 2021-08-17

## 2021-08-17 RX ADMIN — ATROPINE SULFATE 0.4 MG: 0.4 INJECTION, SOLUTION INTRAMUSCULAR; INTRAVENOUS; SUBCUTANEOUS at 12:40

## 2021-08-17 RX ADMIN — ONDANSETRON 8 MG: 2 INJECTION INTRAMUSCULAR; INTRAVENOUS at 09:19

## 2021-08-17 RX ADMIN — MAGNESIUM SULFATE HEPTAHYDRATE 2 G: 40 INJECTION, SOLUTION INTRAVENOUS at 09:06

## 2021-08-17 RX ADMIN — LEUCOVORIN CALCIUM 644 MG: 100 INJECTION, POWDER, LYOPHILIZED, FOR SUSPENSION INTRAMUSCULAR; INTRAVENOUS at 12:50

## 2021-08-17 RX ADMIN — DEXTROSE MONOHYDRATE 25 ML/HR: 5 INJECTION, SOLUTION INTRAVENOUS at 08:50

## 2021-08-17 RX ADMIN — OXALIPLATIN 96.5 MG: 5 INJECTION, SOLUTION INTRAVENOUS at 10:23

## 2021-08-17 RX ADMIN — IRINOTECAN HYDROCHLORIDE 193 MG: 20 INJECTION, SOLUTION INTRAVENOUS at 12:49

## 2021-08-17 RX ADMIN — DEXAMETHASONE SODIUM PHOSPHATE 12 MG: 4 INJECTION, SOLUTION INTRAMUSCULAR; INTRAVENOUS at 09:22

## 2021-08-17 RX ADMIN — FLUOROURACIL 3864 MG: 50 INJECTION, SOLUTION INTRAVENOUS at 14:34

## 2021-08-17 NOTE — PROGRESS NOTES
Arrived to the infusion center. Assessment done and labs reviewed. Folfirinox completed without signs of adverse reaction. No new issues or concerns voiced during the visit. Aware of hef next appointment on 8/19 at 1530. Ethan Daley Discharged ambulatory in satisfactory condition.

## 2021-08-19 ENCOUNTER — HOSPITAL ENCOUNTER (OUTPATIENT)
Dept: INFUSION THERAPY | Age: 76
Discharge: HOME | End: 2021-08-19
Payer: MEDICARE

## 2021-08-19 VITALS
OXYGEN SATURATION: 97 % | DIASTOLIC BLOOD PRESSURE: 65 MMHG | HEART RATE: 70 BPM | SYSTOLIC BLOOD PRESSURE: 109 MMHG | TEMPERATURE: 97.8 F | RESPIRATION RATE: 18 BRPM

## 2021-08-19 DIAGNOSIS — C25.1 MALIGNANT NEOPLASM OF BODY OF PANCREAS (HCC): Primary | ICD-10-CM

## 2021-08-19 PROCEDURE — 96523 IRRIG DRUG DELIVERY DEVICE: CPT

## 2021-08-19 RX ORDER — HEPARIN 100 UNIT/ML
300-500 SYRINGE INTRAVENOUS AS NEEDED
Status: DISCONTINUED | OUTPATIENT
Start: 2021-08-19 | End: 2021-08-20 | Stop reason: HOSPADM

## 2021-08-19 RX ORDER — SODIUM CHLORIDE 0.9 % (FLUSH) 0.9 %
10 SYRINGE (ML) INJECTION AS NEEDED
Status: DISCONTINUED | OUTPATIENT
Start: 2021-08-19 | End: 2021-08-20 | Stop reason: HOSPADM

## 2021-08-19 RX ORDER — SODIUM CHLORIDE 9 MG/ML
10 INJECTION INTRAMUSCULAR; INTRAVENOUS; SUBCUTANEOUS AS NEEDED
Status: DISCONTINUED | OUTPATIENT
Start: 2021-08-19 | End: 2021-08-20 | Stop reason: HOSPADM

## 2021-08-19 RX ADMIN — Medication 10 ML: at 15:45

## 2021-08-19 NOTE — PROGRESS NOTES
[facility]. Chemo pump  completed. Patient tolerated well. Any issues or concerns during appointment: none. Patient aware of next infusion appointment on 8/31/21 (date) at 8:30am (time). Patient aware of next lab and [facility] office visit on 8/30/21 (date) at 1:20p/2p (time). Discharged ambulatory.

## 2021-08-30 ENCOUNTER — HOSPITAL ENCOUNTER (OUTPATIENT)
Dept: LAB | Age: 76
Discharge: HOME | End: 2021-08-30
Payer: MEDICARE

## 2021-08-30 ENCOUNTER — PATIENT OUTREACH (OUTPATIENT)
Dept: CASE MANAGEMENT | Age: 76
End: 2021-08-30

## 2021-08-30 DIAGNOSIS — C25.1 MALIGNANT NEOPLASM OF BODY OF PANCREAS (HCC): ICD-10-CM

## 2021-08-30 LAB
ALBUMIN SERPL-MCNC: 2.4 G/DL (ref 3.2–4.6)
ALBUMIN/GLOB SERPL: 0.5 {RATIO} (ref 1.2–3.5)
ALP SERPL-CCNC: 110 U/L (ref 50–136)
ALT SERPL-CCNC: 30 U/L (ref 12–65)
ANION GAP SERPL CALC-SCNC: 6 MMOL/L (ref 7–16)
AST SERPL-CCNC: 33 U/L (ref 15–37)
BASOPHILS # BLD: 0 K/UL (ref 0–0.2)
BASOPHILS NFR BLD: 0 % (ref 0–2)
BILIRUB SERPL-MCNC: 0.3 MG/DL (ref 0.2–1.1)
BUN SERPL-MCNC: 16 MG/DL (ref 8–23)
CALCIUM SERPL-MCNC: 9.3 MG/DL (ref 8.3–10.4)
CANCER AG19-9 SERPL-ACNC: 29.1 U/ML (ref 2–37)
CHLORIDE SERPL-SCNC: 102 MMOL/L (ref 98–107)
CO2 SERPL-SCNC: 28 MMOL/L (ref 21–32)
CREAT SERPL-MCNC: 1.2 MG/DL (ref 0.6–1)
DIFFERENTIAL METHOD BLD: ABNORMAL
EOSINOPHIL # BLD: 0 K/UL (ref 0–0.8)
EOSINOPHIL NFR BLD: 1 % (ref 0.5–7.8)
ERYTHROCYTE [DISTWIDTH] IN BLOOD BY AUTOMATED COUNT: 18.6 % (ref 11.9–14.6)
GLOBULIN SER CALC-MCNC: 5.3 G/DL (ref 2.3–3.5)
GLUCOSE SERPL-MCNC: 221 MG/DL (ref 65–100)
HCT VFR BLD AUTO: 33.4 % (ref 35.8–46.3)
HGB BLD-MCNC: 10.3 G/DL (ref 11.7–15.4)
IMM GRANULOCYTES # BLD AUTO: 0 K/UL (ref 0–0.5)
IMM GRANULOCYTES NFR BLD AUTO: 0 % (ref 0–5)
LYMPHOCYTES # BLD: 5.1 K/UL (ref 0.5–4.6)
LYMPHOCYTES NFR BLD: 65 % (ref 13–44)
MAGNESIUM SERPL-MCNC: 1.5 MG/DL (ref 1.8–2.4)
MCH RBC QN AUTO: 25.4 PG (ref 26.1–32.9)
MCHC RBC AUTO-ENTMCNC: 30.8 G/DL (ref 31.4–35)
MCV RBC AUTO: 82.5 FL (ref 79.6–97.8)
MONOCYTES # BLD: 1.4 K/UL (ref 0.1–1.3)
MONOCYTES NFR BLD: 17 % (ref 4–12)
NEUTS SEG # BLD: 1.3 K/UL (ref 1.7–8.2)
NEUTS SEG NFR BLD: 17 % (ref 43–78)
NRBC # BLD: 0 K/UL (ref 0–0.2)
PLATELET # BLD AUTO: 338 K/UL (ref 150–450)
PMV BLD AUTO: 10.3 FL (ref 9.4–12.3)
POTASSIUM SERPL-SCNC: 3.6 MMOL/L (ref 3.5–5.1)
PROT SERPL-MCNC: 7.7 G/DL (ref 6.3–8.2)
RBC # BLD AUTO: 4.05 M/UL (ref 4.05–5.2)
SODIUM SERPL-SCNC: 136 MMOL/L (ref 136–145)
WBC # BLD AUTO: 7.9 K/UL (ref 4.3–11.1)

## 2021-08-30 PROCEDURE — 83735 ASSAY OF MAGNESIUM: CPT

## 2021-08-30 PROCEDURE — 36415 COLL VENOUS BLD VENIPUNCTURE: CPT

## 2021-08-30 PROCEDURE — 85025 COMPLETE CBC W/AUTO DIFF WBC: CPT

## 2021-08-30 PROCEDURE — 86301 IMMUNOASSAY TUMOR CA 19-9: CPT

## 2021-08-30 PROCEDURE — 80053 COMPREHEN METABOLIC PANEL: CPT

## 2021-08-30 NOTE — PROGRESS NOTES
8/30/21 saw pt today with Catherine Jasmine NP for pre chemo cycle 4 FOLFIRINOX. She is tolerating chemo well. Discussed the importance of PO fluids and will get extra fluids with chemo this week. Will start remeron for appetite. Infusion tomorrow. Follow up in 2 weeks. Encouraged to call with any concerns. Navigation will continue to follow.

## 2021-08-31 ENCOUNTER — HOSPITAL ENCOUNTER (OUTPATIENT)
Dept: INFUSION THERAPY | Age: 76
Discharge: HOME | End: 2021-08-31
Payer: MEDICARE

## 2021-08-31 VITALS
RESPIRATION RATE: 16 BRPM | HEART RATE: 71 BPM | SYSTOLIC BLOOD PRESSURE: 150 MMHG | WEIGHT: 121.8 LBS | TEMPERATURE: 98.1 F | DIASTOLIC BLOOD PRESSURE: 68 MMHG | OXYGEN SATURATION: 96 % | BODY MASS INDEX: 23.79 KG/M2

## 2021-08-31 DIAGNOSIS — C25.1 MALIGNANT NEOPLASM OF BODY OF PANCREAS (HCC): Primary | ICD-10-CM

## 2021-08-31 PROCEDURE — 96415 CHEMO IV INFUSION ADDL HR: CPT

## 2021-08-31 PROCEDURE — 96368 THER/DIAG CONCURRENT INF: CPT

## 2021-08-31 PROCEDURE — G0498 CHEMO EXTEND IV INFUS W/PUMP: HCPCS

## 2021-08-31 PROCEDURE — 96375 TX/PRO/DX INJ NEW DRUG ADDON: CPT

## 2021-08-31 PROCEDURE — 96372 THER/PROPH/DIAG INJ SC/IM: CPT

## 2021-08-31 PROCEDURE — 74011250636 HC RX REV CODE- 250/636: Performed by: NURSE PRACTITIONER

## 2021-08-31 PROCEDURE — 96417 CHEMO IV INFUS EACH ADDL SEQ: CPT

## 2021-08-31 PROCEDURE — 96413 CHEMO IV INFUSION 1 HR: CPT

## 2021-08-31 PROCEDURE — 74011000258 HC RX REV CODE- 258: Performed by: NURSE PRACTITIONER

## 2021-08-31 RX ORDER — DEXTROSE MONOHYDRATE 50 MG/ML
25 INJECTION, SOLUTION INTRAVENOUS CONTINUOUS
Status: ACTIVE | OUTPATIENT
Start: 2021-08-31 | End: 2021-08-31

## 2021-08-31 RX ORDER — SODIUM CHLORIDE 9 MG/ML
1000 INJECTION, SOLUTION INTRAVENOUS CONTINUOUS
Status: DISCONTINUED | OUTPATIENT
Start: 2021-08-31 | End: 2021-09-02 | Stop reason: HOSPADM

## 2021-08-31 RX ORDER — ONDANSETRON 2 MG/ML
8 INJECTION INTRAMUSCULAR; INTRAVENOUS ONCE
Status: COMPLETED | OUTPATIENT
Start: 2021-08-31 | End: 2021-08-31

## 2021-08-31 RX ORDER — ATROPINE SULFATE 0.4 MG/ML
0.4 INJECTION, SOLUTION ENDOTRACHEAL; INTRAMEDULLARY; INTRAMUSCULAR; INTRAVENOUS; SUBCUTANEOUS ONCE
Status: COMPLETED | OUTPATIENT
Start: 2021-08-31 | End: 2021-08-31

## 2021-08-31 RX ORDER — ATROPINE SULFATE 0.4 MG/ML
0.4 INJECTION, SOLUTION ENDOTRACHEAL; INTRAMEDULLARY; INTRAMUSCULAR; INTRAVENOUS; SUBCUTANEOUS
Status: ACTIVE | OUTPATIENT
Start: 2021-08-31 | End: 2021-08-31

## 2021-08-31 RX ORDER — MAGNESIUM SULFATE HEPTAHYDRATE 40 MG/ML
2 INJECTION, SOLUTION INTRAVENOUS ONCE
Status: COMPLETED | OUTPATIENT
Start: 2021-08-31 | End: 2021-08-31

## 2021-08-31 RX ORDER — SODIUM CHLORIDE 0.9 % (FLUSH) 0.9 %
10 SYRINGE (ML) INJECTION AS NEEDED
Status: ACTIVE | OUTPATIENT
Start: 2021-08-31 | End: 2021-08-31

## 2021-08-31 RX ADMIN — DEXTROSE MONOHYDRATE 25 ML/HR: 5 INJECTION, SOLUTION INTRAVENOUS at 09:21

## 2021-08-31 RX ADMIN — OXALIPLATIN 96.5 MG: 5 INJECTION, SOLUTION INTRAVENOUS at 09:32

## 2021-08-31 RX ADMIN — SODIUM CHLORIDE 1000 ML: 9 INJECTION, SOLUTION INTRAVENOUS at 11:40

## 2021-08-31 RX ADMIN — ONDANSETRON 8 MG: 2 INJECTION INTRAMUSCULAR; INTRAVENOUS at 08:54

## 2021-08-31 RX ADMIN — Medication 10 ML: at 09:10

## 2021-08-31 RX ADMIN — IRINOTECAN HYDROCHLORIDE 193 MG: 20 INJECTION, SOLUTION INTRAVENOUS at 11:40

## 2021-08-31 RX ADMIN — ATROPINE SULFATE 0.4 MG: 0.4 INJECTION, SOLUTION INTRAMUSCULAR; INTRAVENOUS; SUBCUTANEOUS at 11:42

## 2021-08-31 RX ADMIN — LEUCOVORIN CALCIUM 644 MG: 100 INJECTION, POWDER, LYOPHILIZED, FOR SUSPENSION INTRAMUSCULAR; INTRAVENOUS at 11:40

## 2021-08-31 RX ADMIN — MAGNESIUM SULFATE HEPTAHYDRATE 2 G: 40 INJECTION, SOLUTION INTRAVENOUS at 09:02

## 2021-08-31 RX ADMIN — DEXAMETHASONE SODIUM PHOSPHATE 12 MG: 4 INJECTION, SOLUTION INTRAMUSCULAR; INTRAVENOUS at 09:05

## 2021-08-31 RX ADMIN — FLUOROURACIL 3864 MG: 50 INJECTION, SOLUTION INTRAVENOUS at 13:23

## 2021-08-31 NOTE — PROGRESS NOTES
Pt. Discharged ambulatory. Tolerated chemotherapy well. No distress noted. Elastomeric pump in place and functioning without difficulty. To call physician with any problems or concerns. Understanding voiced. To return to Infusions on 8/2/21.

## 2021-09-02 ENCOUNTER — HOSPITAL ENCOUNTER (OUTPATIENT)
Dept: INFUSION THERAPY | Age: 76
Discharge: HOME | End: 2021-09-02
Payer: MEDICARE

## 2021-09-02 VITALS
SYSTOLIC BLOOD PRESSURE: 108 MMHG | DIASTOLIC BLOOD PRESSURE: 51 MMHG | TEMPERATURE: 97.9 F | HEART RATE: 75 BPM | RESPIRATION RATE: 18 BRPM | OXYGEN SATURATION: 94 %

## 2021-09-02 DIAGNOSIS — C25.1 MALIGNANT NEOPLASM OF BODY OF PANCREAS (HCC): Primary | ICD-10-CM

## 2021-09-02 PROCEDURE — 96360 HYDRATION IV INFUSION INIT: CPT

## 2021-09-02 PROCEDURE — 74011250636 HC RX REV CODE- 250/636: Performed by: NURSE PRACTITIONER

## 2021-09-02 RX ORDER — SODIUM CHLORIDE 0.9 % (FLUSH) 0.9 %
10 SYRINGE (ML) INJECTION AS NEEDED
Status: DISCONTINUED | OUTPATIENT
Start: 2021-09-02 | End: 2021-09-03 | Stop reason: HOSPADM

## 2021-09-02 RX ORDER — SODIUM CHLORIDE 9 MG/ML
1000 INJECTION, SOLUTION INTRAVENOUS CONTINUOUS
Status: DISCONTINUED | OUTPATIENT
Start: 2021-09-02 | End: 2021-09-04 | Stop reason: HOSPADM

## 2021-09-02 RX ADMIN — SODIUM CHLORIDE 1000 ML: 9 INJECTION, SOLUTION INTRAVENOUS at 15:28

## 2021-09-02 RX ADMIN — Medication 10 ML: at 15:27

## 2021-09-02 RX ADMIN — Medication 10 ML: at 16:34

## 2021-09-02 NOTE — PROGRESS NOTES
Arrived to the [facility]. Pump discontinue and IVF infusion completed. Patient tolerated well. Any issues or concerns during appointment: none. Patient aware of next infusion appointment on 09/14/2021 (date) at 0830 (time). Discharged ambulatory.

## 2021-09-07 ENCOUNTER — PATIENT OUTREACH (OUTPATIENT)
Dept: CASE MANAGEMENT | Age: 76
End: 2021-09-07

## 2021-09-07 NOTE — ADDENDUM NOTE
Encounter addended by: Saloni Adames RN on: 9/7/2021 6:52 PM   Actions taken: MAR administration edited, MAR administration accepted

## 2021-09-07 NOTE — PROGRESS NOTES
9/7/21 pt called and stated she had another fall over the weekend. Said she was walking and her legs just gave out. Pt denies - neuropathy, back pain, leg pain, headache, changes in vision, loss of control of bowel or bladder, orthostatic issues, etc.  Stated legs just give out. Discussed with Dr. Robson Smith - offer physical therapy. I called and spoke with pt and she has had PT in the past and will just do exercises at home. Discussed using bands for strengthening and from a sitting position. She also has a walker which I advised her to use for now. Will evaluate her on 9/13 during scheduled OV. She may want a physical therapy eval at that time, but wants to try at home on her own for now. Navigation will continue to follow.

## 2021-09-13 ENCOUNTER — PATIENT OUTREACH (OUTPATIENT)
Dept: CASE MANAGEMENT | Age: 76
End: 2021-09-13

## 2021-09-13 ENCOUNTER — HOSPITAL ENCOUNTER (OUTPATIENT)
Dept: LAB | Age: 76
Discharge: HOME | End: 2021-09-13
Payer: MEDICARE

## 2021-09-13 DIAGNOSIS — C25.1 MALIGNANT NEOPLASM OF BODY OF PANCREAS (HCC): ICD-10-CM

## 2021-09-13 LAB
ALBUMIN SERPL-MCNC: 2.7 G/DL (ref 3.2–4.6)
ALBUMIN/GLOB SERPL: 0.5 {RATIO} (ref 1.2–3.5)
ALP SERPL-CCNC: 104 U/L (ref 50–136)
ALT SERPL-CCNC: 22 U/L (ref 12–65)
ANION GAP SERPL CALC-SCNC: 5 MMOL/L (ref 7–16)
AST SERPL-CCNC: 22 U/L (ref 15–37)
BASOPHILS # BLD: 0 K/UL (ref 0–0.2)
BASOPHILS NFR BLD: 0 % (ref 0–2)
BILIRUB SERPL-MCNC: 0.3 MG/DL (ref 0.2–1.1)
BUN SERPL-MCNC: 16 MG/DL (ref 8–23)
CALCIUM SERPL-MCNC: 9.5 MG/DL (ref 8.3–10.4)
CHLORIDE SERPL-SCNC: 102 MMOL/L (ref 98–107)
CO2 SERPL-SCNC: 29 MMOL/L (ref 21–32)
CREAT SERPL-MCNC: 1 MG/DL (ref 0.6–1)
DIFFERENTIAL METHOD BLD: ABNORMAL
EOSINOPHIL # BLD: 0 K/UL (ref 0–0.8)
EOSINOPHIL NFR BLD: 0 % (ref 0.5–7.8)
ERYTHROCYTE [DISTWIDTH] IN BLOOD BY AUTOMATED COUNT: 21.2 % (ref 11.9–14.6)
GLOBULIN SER CALC-MCNC: 5.1 G/DL (ref 2.3–3.5)
GLUCOSE SERPL-MCNC: 161 MG/DL (ref 65–100)
HCT VFR BLD AUTO: 32.6 % (ref 35.8–46.3)
HGB BLD-MCNC: 10.1 G/DL (ref 11.7–15.4)
IMM GRANULOCYTES # BLD AUTO: 0 K/UL (ref 0–0.5)
IMM GRANULOCYTES NFR BLD AUTO: 0 % (ref 0–5)
LYMPHOCYTES # BLD: 7.1 K/UL (ref 0.5–4.6)
LYMPHOCYTES NFR BLD: 71 % (ref 13–44)
MAGNESIUM SERPL-MCNC: 1.5 MG/DL (ref 1.8–2.4)
MCH RBC QN AUTO: 25.9 PG (ref 26.1–32.9)
MCHC RBC AUTO-ENTMCNC: 31 G/DL (ref 31.4–35)
MCV RBC AUTO: 83.6 FL (ref 79.6–97.8)
MONOCYTES # BLD: 1.6 K/UL (ref 0.1–1.3)
MONOCYTES NFR BLD: 16 % (ref 4–12)
NEUTS SEG # BLD: 1.3 K/UL (ref 1.7–8.2)
NEUTS SEG NFR BLD: 13 % (ref 43–78)
NRBC # BLD: 0.02 K/UL (ref 0–0.2)
PLATELET # BLD AUTO: 404 K/UL (ref 150–450)
PMV BLD AUTO: 10.4 FL (ref 9.4–12.3)
POTASSIUM SERPL-SCNC: 3.7 MMOL/L (ref 3.5–5.1)
PROT SERPL-MCNC: 7.8 G/DL (ref 6.3–8.2)
RBC # BLD AUTO: 3.9 M/UL (ref 4.05–5.2)
SODIUM SERPL-SCNC: 136 MMOL/L (ref 136–145)
WBC # BLD AUTO: 10.1 K/UL (ref 4.3–11.1)

## 2021-09-13 PROCEDURE — 36415 COLL VENOUS BLD VENIPUNCTURE: CPT

## 2021-09-13 PROCEDURE — 80053 COMPREHEN METABOLIC PANEL: CPT

## 2021-09-13 PROCEDURE — 85025 COMPLETE CBC W/AUTO DIFF WBC: CPT

## 2021-09-13 PROCEDURE — 83735 ASSAY OF MAGNESIUM: CPT

## 2021-09-13 NOTE — PROGRESS NOTES
9/13/21 saw pt today with Dr. Shelley Bustamante for pre chemo cycle 5 FOLFIRINOX. Discussed recent falls, will refer to onc rehab for PT. PO intake is good. Infusion tomorrow. Follow up in 2 weeks. Encouraged to call with any concerns. Navigation will continue to follow.

## 2021-09-14 ENCOUNTER — HOSPITAL ENCOUNTER (OUTPATIENT)
Dept: INFUSION THERAPY | Age: 76
Discharge: HOME | End: 2021-09-14
Payer: MEDICARE

## 2021-09-14 VITALS
RESPIRATION RATE: 18 BRPM | BODY MASS INDEX: 23.98 KG/M2 | SYSTOLIC BLOOD PRESSURE: 124 MMHG | DIASTOLIC BLOOD PRESSURE: 70 MMHG | TEMPERATURE: 98.4 F | WEIGHT: 122.8 LBS | HEART RATE: 73 BPM

## 2021-09-14 DIAGNOSIS — C25.1 MALIGNANT NEOPLASM OF BODY OF PANCREAS (HCC): Primary | ICD-10-CM

## 2021-09-14 PROCEDURE — 96372 THER/PROPH/DIAG INJ SC/IM: CPT

## 2021-09-14 PROCEDURE — 96368 THER/DIAG CONCURRENT INF: CPT

## 2021-09-14 PROCEDURE — 74011250636 HC RX REV CODE- 250/636: Performed by: INTERNAL MEDICINE

## 2021-09-14 PROCEDURE — 96413 CHEMO IV INFUSION 1 HR: CPT

## 2021-09-14 PROCEDURE — 96417 CHEMO IV INFUS EACH ADDL SEQ: CPT

## 2021-09-14 PROCEDURE — 96415 CHEMO IV INFUSION ADDL HR: CPT

## 2021-09-14 PROCEDURE — 96375 TX/PRO/DX INJ NEW DRUG ADDON: CPT

## 2021-09-14 PROCEDURE — G0498 CHEMO EXTEND IV INFUS W/PUMP: HCPCS

## 2021-09-14 PROCEDURE — 74011000258 HC RX REV CODE- 258: Performed by: INTERNAL MEDICINE

## 2021-09-14 PROCEDURE — 96367 TX/PROPH/DG ADDL SEQ IV INF: CPT

## 2021-09-14 RX ORDER — SODIUM CHLORIDE 0.9 % (FLUSH) 0.9 %
10 SYRINGE (ML) INJECTION AS NEEDED
Status: ACTIVE | OUTPATIENT
Start: 2021-09-14 | End: 2021-09-14

## 2021-09-14 RX ORDER — ATROPINE SULFATE 0.4 MG/ML
0.4 INJECTION, SOLUTION ENDOTRACHEAL; INTRAMEDULLARY; INTRAMUSCULAR; INTRAVENOUS; SUBCUTANEOUS ONCE
Status: COMPLETED | OUTPATIENT
Start: 2021-09-14 | End: 2021-09-14

## 2021-09-14 RX ORDER — DEXTROSE MONOHYDRATE 50 MG/ML
25 INJECTION, SOLUTION INTRAVENOUS CONTINUOUS
Status: ACTIVE | OUTPATIENT
Start: 2021-09-14 | End: 2021-09-14

## 2021-09-14 RX ORDER — ONDANSETRON 2 MG/ML
8 INJECTION INTRAMUSCULAR; INTRAVENOUS ONCE
Status: COMPLETED | OUTPATIENT
Start: 2021-09-14 | End: 2021-09-14

## 2021-09-14 RX ADMIN — OXALIPLATIN 91 MG: 5 INJECTION, SOLUTION INTRAVENOUS at 10:00

## 2021-09-14 RX ADMIN — ONDANSETRON 8 MG: 2 INJECTION INTRAMUSCULAR; INTRAVENOUS at 09:09

## 2021-09-14 RX ADMIN — Medication 10 ML: at 13:52

## 2021-09-14 RX ADMIN — LEUCOVORIN CALCIUM 600 MG: 200 INJECTION, POWDER, LYOPHILIZED, FOR SOLUTION INTRAMUSCULAR; INTRAVENOUS at 12:15

## 2021-09-14 RX ADMIN — IRINOTECAN HYDROCHLORIDE 180 MG: 20 INJECTION, SOLUTION INTRAVENOUS at 12:15

## 2021-09-14 RX ADMIN — FLUOROURACIL 3500 MG: 50 INJECTION, SOLUTION INTRAVENOUS at 13:52

## 2021-09-14 RX ADMIN — DEXTROSE MONOHYDRATE 25 ML/HR: 5 INJECTION, SOLUTION INTRAVENOUS at 09:07

## 2021-09-14 RX ADMIN — ATROPINE SULFATE 0.4 MG: 0.4 INJECTION, SOLUTION INTRAMUSCULAR; INTRAVENOUS; SUBCUTANEOUS at 10:01

## 2021-09-14 RX ADMIN — DEXAMETHASONE SODIUM PHOSPHATE 12 MG: 4 INJECTION, SOLUTION INTRAMUSCULAR; INTRAVENOUS at 09:30

## 2021-09-14 NOTE — PROGRESS NOTES
Pt arrived ambulatory. Premeds and Folfirinox given without complications. Pt discharged with 5FU pump infusing. Pt aware of next appt 9/16 at 1500. Discharged ambulatory, no distress noted.

## 2021-09-14 NOTE — ADDENDUM NOTE
Encounter addended by: Ricki Bustos [facility] on: 9/14/2021 9:13 AM   Actions taken: i-Vent created or edited

## 2021-09-15 ENCOUNTER — HOSPITAL ENCOUNTER (OUTPATIENT)
Dept: GENERAL RADIOLOGY | Age: 76
Discharge: HOME | End: 2021-09-15
Payer: MEDICARE

## 2021-09-15 DIAGNOSIS — J90 PLEURAL EFFUSION: ICD-10-CM

## 2021-09-15 PROCEDURE — 71046 X-RAY EXAM CHEST 2 VIEWS: CPT

## 2021-09-16 ENCOUNTER — HOSPITAL ENCOUNTER (OUTPATIENT)
Dept: INFUSION THERAPY | Age: 76
Discharge: HOME | End: 2021-09-16
Payer: MEDICARE

## 2021-09-16 VITALS
HEART RATE: 73 BPM | OXYGEN SATURATION: 96 % | SYSTOLIC BLOOD PRESSURE: 113 MMHG | DIASTOLIC BLOOD PRESSURE: 53 MMHG | TEMPERATURE: 98.9 F | RESPIRATION RATE: 18 BRPM

## 2021-09-16 DIAGNOSIS — C25.1 MALIGNANT NEOPLASM OF BODY OF PANCREAS (HCC): Primary | ICD-10-CM

## 2021-09-16 PROCEDURE — 96523 IRRIG DRUG DELIVERY DEVICE: CPT

## 2021-09-16 RX ORDER — SODIUM CHLORIDE 0.9 % (FLUSH) 0.9 %
10 SYRINGE (ML) INJECTION AS NEEDED
Status: DISCONTINUED | OUTPATIENT
Start: 2021-09-16 | End: 2021-09-17 | Stop reason: HOSPADM

## 2021-09-16 RX ADMIN — Medication 10 ML: at 15:18

## 2021-09-16 NOTE — PROGRESS NOTES
Arrived to the [facility]. Pump d/c completed. Provided education on calling provider with temp 100.5 or higher, orworsening symptoms uncontrolled by home meds  Patient instructed to report any side affects to ordering provider. Patient tolerated well. Any issues or concerns during appointment: Pt reports increased neuropathy to hands with this cycle. Pt instructed to notify physician at next [address] Patient aware of next infusion appointment on 9/28 (date) at 0800 (time). Discharged ambulatory, no distress noted. Vikram Nguyen

## 2021-09-27 ENCOUNTER — HOSPITAL ENCOUNTER (OUTPATIENT)
Dept: LAB | Age: 76
Discharge: HOME | End: 2021-09-27
Payer: MEDICARE

## 2021-09-27 ENCOUNTER — PATIENT OUTREACH (OUTPATIENT)
Dept: CASE MANAGEMENT | Age: 76
End: 2021-09-27

## 2021-09-27 DIAGNOSIS — C25.1 MALIGNANT NEOPLASM OF BODY OF PANCREAS (HCC): ICD-10-CM

## 2021-09-27 LAB
ALBUMIN SERPL-MCNC: 2.3 G/DL (ref 3.2–4.6)
ALBUMIN/GLOB SERPL: 0.5 {RATIO} (ref 1.2–3.5)
ALP SERPL-CCNC: 95 U/L (ref 50–136)
ALT SERPL-CCNC: 23 U/L (ref 12–65)
ANION GAP SERPL CALC-SCNC: 7 MMOL/L (ref 7–16)
AST SERPL-CCNC: 27 U/L (ref 15–37)
BASOPHILS # BLD: 0.1 K/UL (ref 0–0.2)
BASOPHILS NFR BLD: 1 % (ref 0–2)
BILIRUB SERPL-MCNC: 0.2 MG/DL (ref 0.2–1.1)
BUN SERPL-MCNC: 13 MG/DL (ref 8–23)
CALCIUM SERPL-MCNC: 9 MG/DL (ref 8.3–10.4)
CHLORIDE SERPL-SCNC: 103 MMOL/L (ref 98–107)
CO2 SERPL-SCNC: 27 MMOL/L (ref 21–32)
CREAT SERPL-MCNC: 1.1 MG/DL (ref 0.6–1)
DIFFERENTIAL METHOD BLD: ABNORMAL
EOSINOPHIL # BLD: 0 K/UL (ref 0–0.8)
EOSINOPHIL NFR BLD: 0 % (ref 0.5–7.8)
ERYTHROCYTE [DISTWIDTH] IN BLOOD BY AUTOMATED COUNT: 21.6 % (ref 11.9–14.6)
GLOBULIN SER CALC-MCNC: 4.9 G/DL (ref 2.3–3.5)
GLUCOSE SERPL-MCNC: 227 MG/DL (ref 65–100)
HCT VFR BLD AUTO: 29.3 % (ref 35.8–46.3)
HGB BLD-MCNC: 9.2 G/DL (ref 11.7–15.4)
IMM GRANULOCYTES # BLD AUTO: 0.2 K/UL (ref 0–0.5)
IMM GRANULOCYTES NFR BLD AUTO: 3 % (ref 0–5)
LYMPHOCYTES # BLD: 3.9 K/UL (ref 0.5–4.6)
LYMPHOCYTES NFR BLD: 49 % (ref 13–44)
MAGNESIUM SERPL-MCNC: 1.4 MG/DL (ref 1.8–2.4)
MCH RBC QN AUTO: 26.1 PG (ref 26.1–32.9)
MCHC RBC AUTO-ENTMCNC: 31.4 G/DL (ref 31.4–35)
MCV RBC AUTO: 83 FL (ref 79.6–97.8)
MONOCYTES # BLD: 2.3 K/UL (ref 0.1–1.3)
MONOCYTES NFR BLD: 29 % (ref 4–12)
NEUTS SEG # BLD: 1.4 K/UL (ref 1.7–8.2)
NEUTS SEG NFR BLD: 18 % (ref 43–78)
NRBC # BLD: 0.1 K/UL (ref 0–0.2)
PLATELET # BLD AUTO: 404 K/UL (ref 150–450)
PMV BLD AUTO: 10.6 FL (ref 9.4–12.3)
POTASSIUM SERPL-SCNC: 3.6 MMOL/L (ref 3.5–5.1)
PROT SERPL-MCNC: 7.2 G/DL (ref 6.3–8.2)
RBC # BLD AUTO: 3.53 M/UL (ref 4.05–5.2)
SODIUM SERPL-SCNC: 137 MMOL/L (ref 136–145)
WBC # BLD AUTO: 7.8 K/UL (ref 4.3–11.1)

## 2021-09-27 PROCEDURE — 80053 COMPREHEN METABOLIC PANEL: CPT

## 2021-09-27 PROCEDURE — 83735 ASSAY OF MAGNESIUM: CPT

## 2021-09-27 PROCEDURE — 36415 COLL VENOUS BLD VENIPUNCTURE: CPT

## 2021-09-27 PROCEDURE — 85025 COMPLETE CBC W/AUTO DIFF WBC: CPT

## 2021-09-27 NOTE — PROGRESS NOTES
9/27/21 saw pt today with CYNTHIA Gutierrez for pre chemo cycle 6 FOLFIRINOX. She is tolerating chemo well. PO intake is good. She needs to call onc rehab back to arrange first visit. Infusion tomorrow. Follow up in 2 weeks. Encouraged to call with any concerns. Navigation will continue to follow.

## 2021-09-28 ENCOUNTER — HOSPITAL ENCOUNTER (OUTPATIENT)
Dept: INFUSION THERAPY | Age: 76
Discharge: HOME | End: 2021-09-28
Payer: MEDICARE

## 2021-09-28 VITALS
DIASTOLIC BLOOD PRESSURE: 62 MMHG | OXYGEN SATURATION: 91 % | TEMPERATURE: 99.8 F | RESPIRATION RATE: 16 BRPM | WEIGHT: 121.6 LBS | HEART RATE: 80 BPM | BODY MASS INDEX: 23.75 KG/M2 | SYSTOLIC BLOOD PRESSURE: 142 MMHG

## 2021-09-28 DIAGNOSIS — C25.1 MALIGNANT NEOPLASM OF BODY OF PANCREAS (HCC): Primary | ICD-10-CM

## 2021-09-28 PROCEDURE — 74011250636 HC RX REV CODE- 250/636: Performed by: NURSE PRACTITIONER

## 2021-09-28 PROCEDURE — 96413 CHEMO IV INFUSION 1 HR: CPT

## 2021-09-28 PROCEDURE — 96375 TX/PRO/DX INJ NEW DRUG ADDON: CPT

## 2021-09-28 PROCEDURE — G0498 CHEMO EXTEND IV INFUS W/PUMP: HCPCS

## 2021-09-28 PROCEDURE — 96415 CHEMO IV INFUSION ADDL HR: CPT

## 2021-09-28 PROCEDURE — 96367 TX/PROPH/DG ADDL SEQ IV INF: CPT

## 2021-09-28 PROCEDURE — 96368 THER/DIAG CONCURRENT INF: CPT

## 2021-09-28 PROCEDURE — 96417 CHEMO IV INFUS EACH ADDL SEQ: CPT

## 2021-09-28 PROCEDURE — 96372 THER/PROPH/DIAG INJ SC/IM: CPT

## 2021-09-28 PROCEDURE — 74011000258 HC RX REV CODE- 258: Performed by: NURSE PRACTITIONER

## 2021-09-28 RX ORDER — ATROPINE SULFATE 0.4 MG/ML
0.4 INJECTION, SOLUTION ENDOTRACHEAL; INTRAMEDULLARY; INTRAMUSCULAR; INTRAVENOUS; SUBCUTANEOUS ONCE
Status: COMPLETED | OUTPATIENT
Start: 2021-09-28 | End: 2021-09-28

## 2021-09-28 RX ORDER — DEXTROSE MONOHYDRATE 50 MG/ML
25 INJECTION, SOLUTION INTRAVENOUS CONTINUOUS
Status: ACTIVE | OUTPATIENT
Start: 2021-09-28 | End: 2021-09-28

## 2021-09-28 RX ORDER — SODIUM CHLORIDE 0.9 % (FLUSH) 0.9 %
10 SYRINGE (ML) INJECTION AS NEEDED
Status: ACTIVE | OUTPATIENT
Start: 2021-09-28 | End: 2021-09-28

## 2021-09-28 RX ORDER — MAGNESIUM SULFATE HEPTAHYDRATE 40 MG/ML
2 INJECTION, SOLUTION INTRAVENOUS ONCE
Status: COMPLETED | OUTPATIENT
Start: 2021-09-28 | End: 2021-09-28

## 2021-09-28 RX ORDER — ONDANSETRON 2 MG/ML
8 INJECTION INTRAMUSCULAR; INTRAVENOUS ONCE
Status: COMPLETED | OUTPATIENT
Start: 2021-09-28 | End: 2021-09-28

## 2021-09-28 RX ADMIN — ONDANSETRON 8 MG: 2 INJECTION INTRAMUSCULAR; INTRAVENOUS at 08:51

## 2021-09-28 RX ADMIN — MAGNESIUM SULFATE HEPTAHYDRATE 2 G: 40 INJECTION, SOLUTION INTRAVENOUS at 08:54

## 2021-09-28 RX ADMIN — IRINOTECAN HYDROCHLORIDE 180 MG: 20 INJECTION, SOLUTION INTRAVENOUS at 11:45

## 2021-09-28 RX ADMIN — Medication 10 ML: at 08:47

## 2021-09-28 RX ADMIN — LEUCOVORIN CALCIUM 608 MG: 350 INJECTION, POWDER, LYOPHILIZED, FOR SOLUTION INTRAMUSCULAR; INTRAVENOUS at 11:45

## 2021-09-28 RX ADMIN — OXALIPLATIN 91 MG: 5 INJECTION, SOLUTION INTRAVENOUS at 09:40

## 2021-09-28 RX ADMIN — ATROPINE SULFATE 0.4 MG: 0.4 INJECTION, SOLUTION INTRAMUSCULAR; INTRAVENOUS; SUBCUTANEOUS at 11:35

## 2021-09-28 RX ADMIN — DEXTROSE MONOHYDRATE 25 ML/HR: 5 INJECTION, SOLUTION INTRAVENOUS at 08:48

## 2021-09-28 RX ADMIN — FLUOROURACIL 3500 MG: 50 INJECTION, SOLUTION INTRAVENOUS at 13:28

## 2021-09-28 RX ADMIN — DEXAMETHASONE SODIUM PHOSPHATE 12 MG: 4 INJECTION, SOLUTION INTRAMUSCULAR; INTRAVENOUS at 09:05

## 2021-09-28 NOTE — PROGRESS NOTES
Patient arrived ambulatory to infusion center. FOLFIRINOX & 2G IV MAG completed. Patient tolerated well. Discharged home ambulatory with elastomeric pump with tubing unclamped. Patient aware of pump d/c appt on 9/30.

## 2021-09-30 ENCOUNTER — HOSPITAL ENCOUNTER (OUTPATIENT)
Dept: INFUSION THERAPY | Age: 76
Discharge: HOME | End: 2021-09-30
Payer: MEDICARE

## 2021-09-30 VITALS
OXYGEN SATURATION: 94 % | SYSTOLIC BLOOD PRESSURE: 106 MMHG | HEART RATE: 71 BPM | RESPIRATION RATE: 16 BRPM | TEMPERATURE: 98.6 F | DIASTOLIC BLOOD PRESSURE: 55 MMHG

## 2021-09-30 DIAGNOSIS — C25.1 MALIGNANT NEOPLASM OF BODY OF PANCREAS (HCC): Primary | ICD-10-CM

## 2021-09-30 PROCEDURE — 96523 IRRIG DRUG DELIVERY DEVICE: CPT

## 2021-09-30 RX ORDER — SODIUM CHLORIDE 0.9 % (FLUSH) 0.9 %
10 SYRINGE (ML) INJECTION AS NEEDED
Status: DISCONTINUED | OUTPATIENT
Start: 2021-09-30 | End: 2021-10-01 | Stop reason: HOSPADM

## 2021-09-30 RX ADMIN — Medication 10 ML: at 15:08

## 2021-09-30 NOTE — PROGRESS NOTES
Patient arrived ambulatory to infusion center. Pump disconnect completed. Patient aware of next appt on 10/11. Discharged home ambulatory.

## 2021-10-11 ENCOUNTER — HOSPITAL ENCOUNTER (OUTPATIENT)
Dept: LAB | Age: 76
Discharge: HOME | End: 2021-10-11
Payer: MEDICARE

## 2021-10-11 ENCOUNTER — PATIENT OUTREACH (OUTPATIENT)
Dept: CASE MANAGEMENT | Age: 76
End: 2021-10-11

## 2021-10-11 DIAGNOSIS — C25.1 MALIGNANT NEOPLASM OF BODY OF PANCREAS (HCC): ICD-10-CM

## 2021-10-11 LAB
ALBUMIN SERPL-MCNC: 2.6 G/DL (ref 3.2–4.6)
ALBUMIN/GLOB SERPL: 0.5 {RATIO} (ref 1.2–3.5)
ALP SERPL-CCNC: 105 U/L (ref 50–136)
ALT SERPL-CCNC: 22 U/L (ref 12–65)
ANION GAP SERPL CALC-SCNC: 8 MMOL/L (ref 7–16)
AST SERPL-CCNC: 27 U/L (ref 15–37)
BASOPHILS # BLD: 0 K/UL (ref 0–0.2)
BASOPHILS NFR BLD: 0 % (ref 0–2)
BILIRUB SERPL-MCNC: 0.3 MG/DL (ref 0.2–1.1)
BUN SERPL-MCNC: 16 MG/DL (ref 8–23)
CALCIUM SERPL-MCNC: 9 MG/DL (ref 8.3–10.4)
CANCER AG19-9 SERPL-ACNC: 21.5 U/ML (ref 2–37)
CHLORIDE SERPL-SCNC: 105 MMOL/L (ref 98–107)
CO2 SERPL-SCNC: 27 MMOL/L (ref 21–32)
CREAT SERPL-MCNC: 1.1 MG/DL (ref 0.6–1)
DIFFERENTIAL METHOD BLD: ABNORMAL
EOSINOPHIL # BLD: 0 K/UL (ref 0–0.8)
EOSINOPHIL NFR BLD: 0 % (ref 0.5–7.8)
ERYTHROCYTE [DISTWIDTH] IN BLOOD BY AUTOMATED COUNT: 21.9 % (ref 11.9–14.6)
GLOBULIN SER CALC-MCNC: 5.1 G/DL (ref 2.3–3.5)
GLUCOSE SERPL-MCNC: 215 MG/DL (ref 65–100)
HCT VFR BLD AUTO: 30.6 % (ref 35.8–46.3)
HGB BLD-MCNC: 9.2 G/DL (ref 11.7–15.4)
IMM GRANULOCYTES # BLD AUTO: 0 K/UL (ref 0–0.5)
IMM GRANULOCYTES NFR BLD AUTO: 0 % (ref 0–5)
LYMPHOCYTES # BLD: 6.2 K/UL (ref 0.5–4.6)
LYMPHOCYTES NFR BLD: 57 % (ref 13–44)
MAGNESIUM SERPL-MCNC: 1.4 MG/DL (ref 1.8–2.4)
MCH RBC QN AUTO: 25.6 PG (ref 26.1–32.9)
MCHC RBC AUTO-ENTMCNC: 30.1 G/DL (ref 31.4–35)
MCV RBC AUTO: 85.2 FL (ref 79.6–97.8)
MONOCYTES # BLD: 2.1 K/UL (ref 0.1–1.3)
MONOCYTES NFR BLD: 19 % (ref 4–12)
NEUTS SEG # BLD: 2.7 K/UL (ref 1.7–8.2)
NEUTS SEG NFR BLD: 24 % (ref 43–78)
NRBC # BLD: 0.02 K/UL (ref 0–0.2)
PLATELET # BLD AUTO: 328 K/UL (ref 150–450)
PMV BLD AUTO: 10.8 FL (ref 9.4–12.3)
POTASSIUM SERPL-SCNC: 3.6 MMOL/L (ref 3.5–5.1)
PROT SERPL-MCNC: 7.7 G/DL (ref 6.3–8.2)
RBC # BLD AUTO: 3.59 M/UL (ref 4.05–5.2)
SODIUM SERPL-SCNC: 140 MMOL/L (ref 136–145)
WBC # BLD AUTO: 11.1 K/UL (ref 4.3–11.1)

## 2021-10-11 PROCEDURE — 86301 IMMUNOASSAY TUMOR CA 19-9: CPT

## 2021-10-11 PROCEDURE — 85025 COMPLETE CBC W/AUTO DIFF WBC: CPT

## 2021-10-11 PROCEDURE — 80053 COMPREHEN METABOLIC PANEL: CPT

## 2021-10-11 PROCEDURE — 83735 ASSAY OF MAGNESIUM: CPT

## 2021-10-11 PROCEDURE — 36415 COLL VENOUS BLD VENIPUNCTURE: CPT

## 2021-10-11 NOTE — PROGRESS NOTES
10/11/21 saw pt today with Dr. Sukhdev Garibay for pre chemo cycle 7 FOLFIRINOX. She is tolerating chemo well. Neuropathy intermittent and not affected ADLs. PO intake is adequate. Gout noted in left foot, indocine sent to pharmacy and pt started on colchicine yesterday. Infusion tomorrow. Follow up in 2 weeks. Encouraged to call with any concerns. Navigation will continue to follow.

## 2021-10-12 ENCOUNTER — HOSPITAL ENCOUNTER (OUTPATIENT)
Dept: INFUSION THERAPY | Age: 76
Discharge: HOME | End: 2021-10-12
Payer: MEDICARE

## 2021-10-12 VITALS
HEIGHT: 60 IN | TEMPERATURE: 98.1 F | RESPIRATION RATE: 16 BRPM | BODY MASS INDEX: 23.56 KG/M2 | WEIGHT: 120 LBS | SYSTOLIC BLOOD PRESSURE: 155 MMHG | OXYGEN SATURATION: 93 % | DIASTOLIC BLOOD PRESSURE: 67 MMHG | HEART RATE: 82 BPM

## 2021-10-12 DIAGNOSIS — C25.1 MALIGNANT NEOPLASM OF BODY OF PANCREAS (HCC): Primary | ICD-10-CM

## 2021-10-12 PROCEDURE — 96417 CHEMO IV INFUS EACH ADDL SEQ: CPT

## 2021-10-12 PROCEDURE — 96375 TX/PRO/DX INJ NEW DRUG ADDON: CPT

## 2021-10-12 PROCEDURE — 96367 TX/PROPH/DG ADDL SEQ IV INF: CPT

## 2021-10-12 PROCEDURE — 74011250636 HC RX REV CODE- 250/636: Performed by: NURSE PRACTITIONER

## 2021-10-12 PROCEDURE — 96372 THER/PROPH/DIAG INJ SC/IM: CPT

## 2021-10-12 PROCEDURE — 96368 THER/DIAG CONCURRENT INF: CPT

## 2021-10-12 PROCEDURE — 74011250636 HC RX REV CODE- 250/636: Performed by: INTERNAL MEDICINE

## 2021-10-12 PROCEDURE — 96413 CHEMO IV INFUSION 1 HR: CPT

## 2021-10-12 PROCEDURE — 74011000258 HC RX REV CODE- 258: Performed by: INTERNAL MEDICINE

## 2021-10-12 PROCEDURE — 96415 CHEMO IV INFUSION ADDL HR: CPT

## 2021-10-12 PROCEDURE — G0498 CHEMO EXTEND IV INFUS W/PUMP: HCPCS

## 2021-10-12 RX ORDER — HYDROCORTISONE SODIUM SUCCINATE 100 MG/2ML
100 INJECTION, POWDER, FOR SOLUTION INTRAMUSCULAR; INTRAVENOUS AS NEEDED
Status: ACTIVE | OUTPATIENT
Start: 2021-10-12 | End: 2021-10-12

## 2021-10-12 RX ORDER — MAGNESIUM SULFATE HEPTAHYDRATE 40 MG/ML
2 INJECTION, SOLUTION INTRAVENOUS ONCE
Status: COMPLETED | OUTPATIENT
Start: 2021-10-12 | End: 2021-10-12

## 2021-10-12 RX ORDER — ATROPINE SULFATE 0.4 MG/ML
0.4 INJECTION, SOLUTION ENDOTRACHEAL; INTRAMEDULLARY; INTRAMUSCULAR; INTRAVENOUS; SUBCUTANEOUS ONCE
Status: COMPLETED | OUTPATIENT
Start: 2021-10-12 | End: 2021-10-12

## 2021-10-12 RX ORDER — ONDANSETRON 2 MG/ML
8 INJECTION INTRAMUSCULAR; INTRAVENOUS ONCE
Status: COMPLETED | OUTPATIENT
Start: 2021-10-12 | End: 2021-10-12

## 2021-10-12 RX ORDER — DEXTROSE MONOHYDRATE 50 MG/ML
25 INJECTION, SOLUTION INTRAVENOUS CONTINUOUS
Status: ACTIVE | OUTPATIENT
Start: 2021-10-12 | End: 2021-10-12

## 2021-10-12 RX ORDER — DIPHENHYDRAMINE HYDROCHLORIDE 50 MG/ML
25 INJECTION, SOLUTION INTRAMUSCULAR; INTRAVENOUS AS NEEDED
Status: ACTIVE | OUTPATIENT
Start: 2021-10-12 | End: 2021-10-12

## 2021-10-12 RX ORDER — SODIUM CHLORIDE 0.9 % (FLUSH) 0.9 %
10 SYRINGE (ML) INJECTION AS NEEDED
Status: ACTIVE | OUTPATIENT
Start: 2021-10-12 | End: 2021-10-12

## 2021-10-12 RX ADMIN — ATROPINE SULFATE 0.4 MG: 0.4 INJECTION, SOLUTION INTRAMUSCULAR; INTRAVENOUS; SUBCUTANEOUS at 11:56

## 2021-10-12 RX ADMIN — Medication 10 ML: at 09:00

## 2021-10-12 RX ADMIN — IRINOTECAN HYDROCHLORIDE 180 MG: 20 INJECTION, SOLUTION INTRAVENOUS at 12:20

## 2021-10-12 RX ADMIN — DEXAMETHASONE SODIUM PHOSPHATE 12 MG: 4 INJECTION, SOLUTION INTRAMUSCULAR; INTRAVENOUS at 09:25

## 2021-10-12 RX ADMIN — OXALIPLATIN 91 MG: 5 INJECTION, SOLUTION INTRAVENOUS at 09:55

## 2021-10-12 RX ADMIN — ONDANSETRON 8 MG: 2 INJECTION INTRAMUSCULAR; INTRAVENOUS at 09:18

## 2021-10-12 RX ADMIN — FLUOROURACIL 3500 MG: 50 INJECTION, SOLUTION INTRAVENOUS at 13:54

## 2021-10-12 RX ADMIN — Medication 10 ML: at 13:54

## 2021-10-12 RX ADMIN — DEXTROSE MONOHYDRATE 25 ML/HR: 5 INJECTION, SOLUTION INTRAVENOUS at 09:00

## 2021-10-12 RX ADMIN — LEUCOVORIN CALCIUM 608 MG: 350 INJECTION, POWDER, LYOPHILIZED, FOR SOLUTION INTRAMUSCULAR; INTRAVENOUS at 12:20

## 2021-10-12 RX ADMIN — MAGNESIUM SULFATE HEPTAHYDRATE 2 G: 40 INJECTION, SOLUTION INTRAVENOUS at 09:55

## 2021-10-12 NOTE — PROGRESS NOTES
Reviewed chemotherapy POC with patient. Patient aware that IV Magnesium will be added today for low Magnesium level. Verbalizes understanding.

## 2021-10-12 NOTE — PROGRESS NOTES
Tolerated chemotherapy without difficulty. Fluorouracil elastomeric pump infusing at 5ml/hr x 46 hrs. Patient aware to avoid cold products due to receiving Oxaliplatin medication. Patient has antiemetics at home for use. Patient discharged via ambulation with cane accompanied by self. Instructed to notify physician of any problems, questions or concerns after discharge. Next appointment is 10/14/2021 at 1700 with Infusion.

## 2021-10-14 ENCOUNTER — HOSPITAL ENCOUNTER (OUTPATIENT)
Dept: INFUSION THERAPY | Age: 76
Discharge: HOME | End: 2021-10-14
Payer: MEDICARE

## 2021-10-14 VITALS
SYSTOLIC BLOOD PRESSURE: 148 MMHG | OXYGEN SATURATION: 97 % | RESPIRATION RATE: 18 BRPM | TEMPERATURE: 98.7 F | DIASTOLIC BLOOD PRESSURE: 69 MMHG | HEART RATE: 68 BPM

## 2021-10-14 DIAGNOSIS — C25.1 MALIGNANT NEOPLASM OF BODY OF PANCREAS (HCC): Primary | ICD-10-CM

## 2021-10-14 PROCEDURE — 96523 IRRIG DRUG DELIVERY DEVICE: CPT

## 2021-10-14 RX ORDER — SODIUM CHLORIDE 0.9 % (FLUSH) 0.9 %
10 SYRINGE (ML) INJECTION AS NEEDED
Status: DISCONTINUED | OUTPATIENT
Start: 2021-10-14 | End: 2021-10-15 | Stop reason: HOSPADM

## 2021-10-14 RX ADMIN — Medication 10 ML: at 16:48

## 2021-10-14 NOTE — PROGRESS NOTES
Patient arrived ambulatory to infusion center. Pump d/c completed. Port de-accessed. VS stable. Patient aware of next infusion appt on 10/26.

## 2021-10-25 ENCOUNTER — HOSPITAL ENCOUNTER (OUTPATIENT)
Dept: LAB | Age: 76
Discharge: HOME | End: 2021-10-25
Payer: MEDICARE

## 2021-10-25 ENCOUNTER — PATIENT OUTREACH (OUTPATIENT)
Dept: CASE MANAGEMENT | Age: 76
End: 2021-10-25

## 2021-10-25 DIAGNOSIS — C25.1 MALIGNANT NEOPLASM OF BODY OF PANCREAS (HCC): ICD-10-CM

## 2021-10-25 LAB
ALBUMIN SERPL-MCNC: 2.7 G/DL (ref 3.2–4.6)
ALBUMIN/GLOB SERPL: 0.5 {RATIO} (ref 1.2–3.5)
ALP SERPL-CCNC: 111 U/L (ref 50–136)
ALT SERPL-CCNC: 20 U/L (ref 12–65)
ANION GAP SERPL CALC-SCNC: 6 MMOL/L (ref 7–16)
AST SERPL-CCNC: 27 U/L (ref 15–37)
BASOPHILS # BLD: 0 K/UL (ref 0–0.2)
BASOPHILS NFR BLD: 0 % (ref 0–2)
BILIRUB SERPL-MCNC: 0.3 MG/DL (ref 0.2–1.1)
BUN SERPL-MCNC: 14 MG/DL (ref 8–23)
CALCIUM SERPL-MCNC: 8.8 MG/DL (ref 8.3–10.4)
CHLORIDE SERPL-SCNC: 107 MMOL/L (ref 98–107)
CO2 SERPL-SCNC: 27 MMOL/L (ref 21–32)
CREAT SERPL-MCNC: 1 MG/DL (ref 0.6–1)
DIFFERENTIAL METHOD BLD: ABNORMAL
EOSINOPHIL # BLD: 0.1 K/UL (ref 0–0.8)
EOSINOPHIL NFR BLD: 1 % (ref 0.5–7.8)
ERYTHROCYTE [DISTWIDTH] IN BLOOD BY AUTOMATED COUNT: 22.4 % (ref 11.9–14.6)
GLOBULIN SER CALC-MCNC: 5.3 G/DL (ref 2.3–3.5)
GLUCOSE SERPL-MCNC: 182 MG/DL (ref 65–100)
HCT VFR BLD AUTO: 29.5 % (ref 35.8–46.3)
HGB BLD-MCNC: 9.1 G/DL (ref 11.7–15.4)
IMM GRANULOCYTES # BLD AUTO: 0 K/UL (ref 0–0.5)
IMM GRANULOCYTES NFR BLD AUTO: 0 % (ref 0–5)
LYMPHOCYTES # BLD: 6.6 K/UL (ref 0.5–4.6)
LYMPHOCYTES NFR BLD: 59 % (ref 13–44)
MAGNESIUM SERPL-MCNC: 1.6 MG/DL (ref 1.8–2.4)
MCH RBC QN AUTO: 26.8 PG (ref 26.1–32.9)
MCHC RBC AUTO-ENTMCNC: 30.8 G/DL (ref 31.4–35)
MCV RBC AUTO: 86.8 FL (ref 79.6–97.8)
MONOCYTES # BLD: 1.9 K/UL (ref 0.1–1.3)
MONOCYTES NFR BLD: 17 % (ref 4–12)
NEUTS SEG # BLD: 2.7 K/UL (ref 1.7–8.2)
NEUTS SEG NFR BLD: 24 % (ref 43–78)
NRBC # BLD: 0.02 K/UL (ref 0–0.2)
PLATELET # BLD AUTO: 308 K/UL (ref 150–450)
PMV BLD AUTO: 10.6 FL (ref 9.4–12.3)
POTASSIUM SERPL-SCNC: 3.3 MMOL/L (ref 3.5–5.1)
PROT SERPL-MCNC: 8 G/DL (ref 6.3–8.2)
RBC # BLD AUTO: 3.4 M/UL (ref 4.05–5.2)
SODIUM SERPL-SCNC: 140 MMOL/L (ref 136–145)
WBC # BLD AUTO: 11.2 K/UL (ref 4.3–11.1)

## 2021-10-25 PROCEDURE — 36415 COLL VENOUS BLD VENIPUNCTURE: CPT

## 2021-10-25 PROCEDURE — 85025 COMPLETE CBC W/AUTO DIFF WBC: CPT

## 2021-10-25 PROCEDURE — 80053 COMPREHEN METABOLIC PANEL: CPT

## 2021-10-25 PROCEDURE — 83735 ASSAY OF MAGNESIUM: CPT

## 2021-10-25 NOTE — PROGRESS NOTES
10/25/21  Saw pt today with CYNTHIA Gutierrez for pre chemo cycle 8 FOLFIRINOX. She is reporting an increase in neuropathy in feet. Denies any in fingers. Will dose reduce chemo. PO intake is good. Infusion tomorrow. Follow up in 2 weeks. Encouraged to call with any concerns. Navigation will continue to follow.

## 2021-10-26 ENCOUNTER — HOSPITAL ENCOUNTER (OUTPATIENT)
Dept: INFUSION THERAPY | Age: 76
Discharge: HOME | End: 2021-10-26
Payer: MEDICARE

## 2021-10-26 VITALS
SYSTOLIC BLOOD PRESSURE: 135 MMHG | BODY MASS INDEX: 23.24 KG/M2 | HEART RATE: 74 BPM | TEMPERATURE: 98.1 F | DIASTOLIC BLOOD PRESSURE: 61 MMHG | RESPIRATION RATE: 18 BRPM | WEIGHT: 119 LBS | OXYGEN SATURATION: 95 %

## 2021-10-26 DIAGNOSIS — E83.42 HYPOMAGNESEMIA: ICD-10-CM

## 2021-10-26 DIAGNOSIS — E87.6 HYPOKALEMIA: ICD-10-CM

## 2021-10-26 DIAGNOSIS — C25.1 MALIGNANT NEOPLASM OF BODY OF PANCREAS (HCC): Primary | ICD-10-CM

## 2021-10-26 PROCEDURE — 96367 TX/PROPH/DG ADDL SEQ IV INF: CPT

## 2021-10-26 PROCEDURE — 96415 CHEMO IV INFUSION ADDL HR: CPT

## 2021-10-26 PROCEDURE — 74011000258 HC RX REV CODE- 258: Performed by: NURSE PRACTITIONER

## 2021-10-26 PROCEDURE — 74011250637 HC RX REV CODE- 250/637: Performed by: NURSE PRACTITIONER

## 2021-10-26 PROCEDURE — G0498 CHEMO EXTEND IV INFUS W/PUMP: HCPCS

## 2021-10-26 PROCEDURE — 96368 THER/DIAG CONCURRENT INF: CPT

## 2021-10-26 PROCEDURE — 96417 CHEMO IV INFUS EACH ADDL SEQ: CPT

## 2021-10-26 PROCEDURE — 74011250636 HC RX REV CODE- 250/636: Performed by: NURSE PRACTITIONER

## 2021-10-26 PROCEDURE — 96413 CHEMO IV INFUSION 1 HR: CPT

## 2021-10-26 PROCEDURE — 96375 TX/PRO/DX INJ NEW DRUG ADDON: CPT

## 2021-10-26 PROCEDURE — 96372 THER/PROPH/DIAG INJ SC/IM: CPT

## 2021-10-26 RX ORDER — ONDANSETRON 2 MG/ML
8 INJECTION INTRAMUSCULAR; INTRAVENOUS ONCE
Status: COMPLETED | OUTPATIENT
Start: 2021-10-26 | End: 2021-10-26

## 2021-10-26 RX ORDER — MAGNESIUM SULFATE HEPTAHYDRATE 40 MG/ML
2 INJECTION, SOLUTION INTRAVENOUS ONCE
Status: COMPLETED | OUTPATIENT
Start: 2021-10-26 | End: 2021-10-26

## 2021-10-26 RX ORDER — SODIUM CHLORIDE 0.9 % (FLUSH) 0.9 %
10 SYRINGE (ML) INJECTION AS NEEDED
Status: ACTIVE | OUTPATIENT
Start: 2021-10-26 | End: 2021-10-26

## 2021-10-26 RX ORDER — ATROPINE SULFATE 0.4 MG/ML
0.4 INJECTION, SOLUTION ENDOTRACHEAL; INTRAMEDULLARY; INTRAMUSCULAR; INTRAVENOUS; SUBCUTANEOUS ONCE
Status: COMPLETED | OUTPATIENT
Start: 2021-10-26 | End: 2021-10-26

## 2021-10-26 RX ORDER — POTASSIUM CHLORIDE 750 MG/1
40 TABLET, EXTENDED RELEASE ORAL
Status: COMPLETED | OUTPATIENT
Start: 2021-10-26 | End: 2021-10-26

## 2021-10-26 RX ORDER — DEXTROSE MONOHYDRATE 50 MG/ML
25 INJECTION, SOLUTION INTRAVENOUS CONTINUOUS
Status: ACTIVE | OUTPATIENT
Start: 2021-10-26 | End: 2021-10-26

## 2021-10-26 RX ADMIN — DEXAMETHASONE SODIUM PHOSPHATE 12 MG: 4 INJECTION, SOLUTION INTRAMUSCULAR; INTRAVENOUS at 09:32

## 2021-10-26 RX ADMIN — IRINOTECAN HYDROCHLORIDE 180 MG: 20 INJECTION, SOLUTION INTRAVENOUS at 12:30

## 2021-10-26 RX ADMIN — POTASSIUM CHLORIDE 40 MEQ: 10 TABLET, EXTENDED RELEASE ORAL at 09:18

## 2021-10-26 RX ADMIN — Medication 10 ML: at 09:00

## 2021-10-26 RX ADMIN — LEUCOVORIN CALCIUM 608 MG: 500 INJECTION, POWDER, LYOPHILIZED, FOR SOLUTION INTRAMUSCULAR; INTRAVENOUS at 12:30

## 2021-10-26 RX ADMIN — OXALIPLATIN 73 MG: 5 INJECTION, SOLUTION INTRAVENOUS at 10:19

## 2021-10-26 RX ADMIN — ONDANSETRON 8 MG: 2 INJECTION INTRAMUSCULAR; INTRAVENOUS at 09:19

## 2021-10-26 RX ADMIN — FLUOROURACIL 3500 MG: 50 INJECTION, SOLUTION INTRAVENOUS at 14:13

## 2021-10-26 RX ADMIN — Medication 10 ML: at 14:12

## 2021-10-26 RX ADMIN — MAGNESIUM SULFATE HEPTAHYDRATE 2 G: 40 INJECTION, SOLUTION INTRAVENOUS at 09:21

## 2021-10-26 RX ADMIN — ATROPINE SULFATE 0.4 MG: 0.4 INJECTION, SOLUTION INTRAMUSCULAR; INTRAVENOUS; SUBCUTANEOUS at 12:22

## 2021-10-26 RX ADMIN — DEXTROSE MONOHYDRATE 25 ML/HR: 5 INJECTION, SOLUTION INTRAVENOUS at 09:05

## 2021-10-26 NOTE — PROGRESS NOTES
Arrived to the [facility]. Folfinox completed. Patient tolerated well. Any issues or concerns during appointment: none. Patient aware of next infusion appointment on 10/28/21 (date) at 65 (time). Discharged ambulatory with cane.

## 2021-10-28 ENCOUNTER — HOSPITAL ENCOUNTER (OUTPATIENT)
Dept: INFUSION THERAPY | Age: 76
Discharge: HOME | End: 2021-10-28
Payer: MEDICARE

## 2021-10-28 VITALS
SYSTOLIC BLOOD PRESSURE: 134 MMHG | OXYGEN SATURATION: 96 % | HEART RATE: 85 BPM | TEMPERATURE: 98.2 F | DIASTOLIC BLOOD PRESSURE: 75 MMHG | RESPIRATION RATE: 18 BRPM

## 2021-10-28 DIAGNOSIS — E87.6 HYPOKALEMIA: ICD-10-CM

## 2021-10-28 DIAGNOSIS — E83.42 HYPOMAGNESEMIA: Primary | ICD-10-CM

## 2021-10-28 DIAGNOSIS — C25.1 MALIGNANT NEOPLASM OF BODY OF PANCREAS (HCC): ICD-10-CM

## 2021-10-28 PROCEDURE — 96523 IRRIG DRUG DELIVERY DEVICE: CPT

## 2021-10-28 RX ORDER — SODIUM CHLORIDE 0.9 % (FLUSH) 0.9 %
10 SYRINGE (ML) INJECTION AS NEEDED
Status: DISCONTINUED | OUTPATIENT
Start: 2021-10-28 | End: 2021-10-29 | Stop reason: HOSPADM

## 2021-10-28 RX ADMIN — Medication 10 ML: at 16:19

## 2021-11-08 ENCOUNTER — HOSPITAL ENCOUNTER (OUTPATIENT)
Dept: LAB | Age: 76
Discharge: HOME | End: 2021-11-08
Payer: MEDICARE

## 2021-11-08 ENCOUNTER — PATIENT OUTREACH (OUTPATIENT)
Dept: CASE MANAGEMENT | Age: 76
End: 2021-11-08

## 2021-11-08 DIAGNOSIS — C25.1 MALIGNANT NEOPLASM OF BODY OF PANCREAS (HCC): ICD-10-CM

## 2021-11-08 LAB
ALBUMIN SERPL-MCNC: 3 G/DL (ref 3.2–4.6)
ALBUMIN/GLOB SERPL: 0.6 {RATIO} (ref 1.2–3.5)
ALP SERPL-CCNC: 117 U/L (ref 50–136)
ALT SERPL-CCNC: 23 U/L (ref 12–65)
ANION GAP SERPL CALC-SCNC: 5 MMOL/L (ref 7–16)
AST SERPL-CCNC: 32 U/L (ref 15–37)
BASOPHILS # BLD: 0.1 K/UL (ref 0–0.2)
BASOPHILS NFR BLD: 1 % (ref 0–2)
BILIRUB SERPL-MCNC: 0.3 MG/DL (ref 0.2–1.1)
BUN SERPL-MCNC: 14 MG/DL (ref 8–23)
CALCIUM SERPL-MCNC: 9 MG/DL (ref 8.3–10.4)
CANCER AG19-9 SERPL-ACNC: 35.4 U/ML (ref 2–37)
CHLORIDE SERPL-SCNC: 106 MMOL/L (ref 98–107)
CO2 SERPL-SCNC: 27 MMOL/L (ref 21–32)
CREAT SERPL-MCNC: 1.1 MG/DL (ref 0.6–1)
DIFFERENTIAL METHOD BLD: ABNORMAL
EOSINOPHIL # BLD: 0.1 K/UL (ref 0–0.8)
EOSINOPHIL NFR BLD: 1 % (ref 0.5–7.8)
ERYTHROCYTE [DISTWIDTH] IN BLOOD BY AUTOMATED COUNT: 21.7 % (ref 11.9–14.6)
GLOBULIN SER CALC-MCNC: 5.1 G/DL (ref 2.3–3.5)
GLUCOSE SERPL-MCNC: 212 MG/DL (ref 65–100)
HCT VFR BLD AUTO: 31.8 % (ref 35.8–46.3)
HGB BLD-MCNC: 9.9 G/DL (ref 11.7–15.4)
IMM GRANULOCYTES # BLD AUTO: 0 K/UL (ref 0–0.5)
IMM GRANULOCYTES NFR BLD AUTO: 0 % (ref 0–5)
LYMPHOCYTES # BLD: 5.5 K/UL (ref 0.5–4.6)
LYMPHOCYTES NFR BLD: 48 % (ref 13–44)
MAGNESIUM SERPL-MCNC: 1.6 MG/DL (ref 1.8–2.4)
MCH RBC QN AUTO: 27.9 PG (ref 26.1–32.9)
MCHC RBC AUTO-ENTMCNC: 31.1 G/DL (ref 31.4–35)
MCV RBC AUTO: 89.6 FL (ref 79.6–97.8)
MONOCYTES # BLD: 1.6 K/UL (ref 0.1–1.3)
MONOCYTES NFR BLD: 14 % (ref 4–12)
NEUTS SEG # BLD: 4 K/UL (ref 1.7–8.2)
NEUTS SEG NFR BLD: 36 % (ref 43–78)
NRBC # BLD: 0.02 K/UL (ref 0–0.2)
PLATELET # BLD AUTO: 352 K/UL (ref 150–450)
PMV BLD AUTO: 10.7 FL (ref 9.4–12.3)
POTASSIUM SERPL-SCNC: 3.9 MMOL/L (ref 3.5–5.1)
PROT SERPL-MCNC: 8.1 G/DL (ref 6.3–8.2)
RBC # BLD AUTO: 3.55 M/UL (ref 4.05–5.2)
SODIUM SERPL-SCNC: 138 MMOL/L (ref 136–145)
WBC # BLD AUTO: 11.3 K/UL (ref 4.3–11.1)

## 2021-11-08 PROCEDURE — 80053 COMPREHEN METABOLIC PANEL: CPT

## 2021-11-08 PROCEDURE — 83735 ASSAY OF MAGNESIUM: CPT

## 2021-11-08 PROCEDURE — 86301 IMMUNOASSAY TUMOR CA 19-9: CPT

## 2021-11-08 PROCEDURE — 36415 COLL VENOUS BLD VENIPUNCTURE: CPT

## 2021-11-08 PROCEDURE — 85025 COMPLETE CBC W/AUTO DIFF WBC: CPT

## 2021-11-08 NOTE — PROGRESS NOTES
11/8/21 saw pt today with Dr. Harry Ruff for pre chemo cycle 9 FOLFIRINOX. Tolerated chemo well with dose reduction. Neuropathy stable. PO intake is good. Infusion tomorrow. Follow up in 2 weeks. Encouraged to call with any concerns. Navigation will continue to follow.

## 2021-11-09 ENCOUNTER — HOSPITAL ENCOUNTER (OUTPATIENT)
Dept: INFUSION THERAPY | Age: 76
Discharge: HOME | End: 2021-11-09
Payer: MEDICARE

## 2021-11-09 VITALS
OXYGEN SATURATION: 93 % | WEIGHT: 116.2 LBS | SYSTOLIC BLOOD PRESSURE: 103 MMHG | DIASTOLIC BLOOD PRESSURE: 66 MMHG | RESPIRATION RATE: 18 BRPM | TEMPERATURE: 98.3 F | HEART RATE: 78 BPM | BODY MASS INDEX: 22.69 KG/M2

## 2021-11-09 DIAGNOSIS — C25.1 MALIGNANT NEOPLASM OF BODY OF PANCREAS (HCC): ICD-10-CM

## 2021-11-09 DIAGNOSIS — E87.6 HYPOKALEMIA: ICD-10-CM

## 2021-11-09 DIAGNOSIS — E83.42 HYPOMAGNESEMIA: Primary | ICD-10-CM

## 2021-11-09 PROCEDURE — 96368 THER/DIAG CONCURRENT INF: CPT

## 2021-11-09 PROCEDURE — 96417 CHEMO IV INFUS EACH ADDL SEQ: CPT

## 2021-11-09 PROCEDURE — 74011250636 HC RX REV CODE- 250/636: Performed by: INTERNAL MEDICINE

## 2021-11-09 PROCEDURE — 96372 THER/PROPH/DIAG INJ SC/IM: CPT

## 2021-11-09 PROCEDURE — 96415 CHEMO IV INFUSION ADDL HR: CPT

## 2021-11-09 PROCEDURE — 96375 TX/PRO/DX INJ NEW DRUG ADDON: CPT

## 2021-11-09 PROCEDURE — G0498 CHEMO EXTEND IV INFUS W/PUMP: HCPCS

## 2021-11-09 PROCEDURE — 96413 CHEMO IV INFUSION 1 HR: CPT

## 2021-11-09 PROCEDURE — 74011000258 HC RX REV CODE- 258: Performed by: INTERNAL MEDICINE

## 2021-11-09 RX ORDER — ONDANSETRON 2 MG/ML
8 INJECTION INTRAMUSCULAR; INTRAVENOUS ONCE
Status: COMPLETED | OUTPATIENT
Start: 2021-11-09 | End: 2021-11-09

## 2021-11-09 RX ORDER — SODIUM CHLORIDE 0.9 % (FLUSH) 0.9 %
10 SYRINGE (ML) INJECTION AS NEEDED
Status: ACTIVE | OUTPATIENT
Start: 2021-11-09 | End: 2021-11-09

## 2021-11-09 RX ORDER — ATROPINE SULFATE 0.4 MG/ML
0.4 INJECTION, SOLUTION ENDOTRACHEAL; INTRAMEDULLARY; INTRAMUSCULAR; INTRAVENOUS; SUBCUTANEOUS ONCE
Status: COMPLETED | OUTPATIENT
Start: 2021-11-09 | End: 2021-11-09

## 2021-11-09 RX ORDER — DEXTROSE MONOHYDRATE 50 MG/ML
25 INJECTION, SOLUTION INTRAVENOUS CONTINUOUS
Status: ACTIVE | OUTPATIENT
Start: 2021-11-09 | End: 2021-11-09

## 2021-11-09 RX ORDER — HYDROCORTISONE SODIUM SUCCINATE 100 MG/2ML
100 INJECTION, POWDER, FOR SOLUTION INTRAMUSCULAR; INTRAVENOUS AS NEEDED
Status: ACTIVE | OUTPATIENT
Start: 2021-11-09 | End: 2021-11-09

## 2021-11-09 RX ADMIN — ONDANSETRON 8 MG: 2 INJECTION INTRAMUSCULAR; INTRAVENOUS at 08:51

## 2021-11-09 RX ADMIN — OXALIPLATIN 73 MG: 5 INJECTION, SOLUTION INTRAVENOUS at 09:23

## 2021-11-09 RX ADMIN — ATROPINE SULFATE 0.4 MG: 0.4 INJECTION, SOLUTION INTRAMUSCULAR; INTRAVENOUS; SUBCUTANEOUS at 11:24

## 2021-11-09 RX ADMIN — IRINOTECAN HYDROCHLORIDE 180 MG: 20 INJECTION, SOLUTION INTRAVENOUS at 11:40

## 2021-11-09 RX ADMIN — DEXTROSE MONOHYDRATE 25 ML/HR: 5 INJECTION, SOLUTION INTRAVENOUS at 08:50

## 2021-11-09 RX ADMIN — DEXTROSE MONOHYDRATE 608 MG: 50 INJECTION, SOLUTION INTRAVENOUS at 11:40

## 2021-11-09 RX ADMIN — DEXAMETHASONE SODIUM PHOSPHATE 12 MG: 4 INJECTION, SOLUTION INTRAMUSCULAR; INTRAVENOUS at 08:54

## 2021-11-09 RX ADMIN — FLUOROURACIL 3500 MG: 50 INJECTION, SOLUTION INTRAVENOUS at 13:20

## 2021-11-09 NOTE — PROGRESS NOTES
Arrived to the [facility]. FOLFIRINOX completed and tolerated well. Elastomeric pump connected and unclamped. Any issues or concerns during appointment: no  Patient given education on chemo process  Informed of next infusion appointment scheduled for 11/11/21 at 430pm   Instructed patient to notify provider for any issues or worrisome symptoms. They verbalized understanding. Discharged ambulatory.

## 2021-11-11 ENCOUNTER — HOSPITAL ENCOUNTER (OUTPATIENT)
Dept: INFUSION THERAPY | Age: 76
Discharge: HOME | End: 2021-11-11
Payer: MEDICARE

## 2021-11-11 VITALS
OXYGEN SATURATION: 92 % | DIASTOLIC BLOOD PRESSURE: 52 MMHG | RESPIRATION RATE: 18 BRPM | SYSTOLIC BLOOD PRESSURE: 114 MMHG | TEMPERATURE: 98.8 F | HEART RATE: 73 BPM

## 2021-11-11 DIAGNOSIS — E83.42 HYPOMAGNESEMIA: Primary | ICD-10-CM

## 2021-11-11 DIAGNOSIS — C25.1 MALIGNANT NEOPLASM OF BODY OF PANCREAS (HCC): ICD-10-CM

## 2021-11-11 DIAGNOSIS — E87.6 HYPOKALEMIA: ICD-10-CM

## 2021-11-11 PROCEDURE — 96523 IRRIG DRUG DELIVERY DEVICE: CPT

## 2021-11-11 RX ORDER — SODIUM CHLORIDE 0.9 % (FLUSH) 0.9 %
10 SYRINGE (ML) INJECTION AS NEEDED
Status: DISCONTINUED | OUTPATIENT
Start: 2021-11-11 | End: 2021-11-12 | Stop reason: HOSPADM

## 2021-11-11 RX ADMIN — Medication 10 ML: at 15:40

## 2021-11-11 NOTE — PROGRESS NOTES
Arrived to the [facility]. Assessment and continuous chemotherapy pump completed. Patient tolerated well. Any issues or concerns during appointment: No.  Patient aware of next infusion appointment on 11/23/21 @0800. Discharged ambulatory.

## 2021-11-22 ENCOUNTER — HOSPITAL ENCOUNTER (OUTPATIENT)
Dept: LAB | Age: 76
Discharge: HOME | End: 2021-11-22
Payer: MEDICARE

## 2021-11-22 ENCOUNTER — PATIENT OUTREACH (OUTPATIENT)
Dept: CASE MANAGEMENT | Age: 76
End: 2021-11-22

## 2021-11-22 DIAGNOSIS — C25.1 MALIGNANT NEOPLASM OF BODY OF PANCREAS (HCC): ICD-10-CM

## 2021-11-22 LAB
ALBUMIN SERPL-MCNC: 2.8 G/DL (ref 3.2–4.6)
ALBUMIN/GLOB SERPL: 0.6 {RATIO} (ref 1.2–3.5)
ALP SERPL-CCNC: 106 U/L (ref 50–136)
ALT SERPL-CCNC: 23 U/L (ref 12–65)
ANION GAP SERPL CALC-SCNC: 4 MMOL/L (ref 7–16)
AST SERPL-CCNC: 32 U/L (ref 15–37)
BASOPHILS # BLD: 0 K/UL (ref 0–0.2)
BASOPHILS NFR BLD: 1 % (ref 0–2)
BILIRUB SERPL-MCNC: 0.3 MG/DL (ref 0.2–1.1)
BUN SERPL-MCNC: 15 MG/DL (ref 8–23)
CALCIUM SERPL-MCNC: 9.3 MG/DL (ref 8.3–10.4)
CHLORIDE SERPL-SCNC: 107 MMOL/L (ref 98–107)
CO2 SERPL-SCNC: 28 MMOL/L (ref 21–32)
CREAT SERPL-MCNC: 1 MG/DL (ref 0.6–1)
DIFFERENTIAL METHOD BLD: ABNORMAL
EOSINOPHIL # BLD: 0.2 K/UL (ref 0–0.8)
EOSINOPHIL NFR BLD: 2 % (ref 0.5–7.8)
ERYTHROCYTE [DISTWIDTH] IN BLOOD BY AUTOMATED COUNT: 21.1 % (ref 11.9–14.6)
GLOBULIN SER CALC-MCNC: 4.9 G/DL (ref 2.3–3.5)
GLUCOSE SERPL-MCNC: 203 MG/DL (ref 65–100)
HCT VFR BLD AUTO: 32.6 % (ref 35.8–46.3)
HGB BLD-MCNC: 10 G/DL (ref 11.7–15.4)
IMM GRANULOCYTES # BLD AUTO: 0 K/UL (ref 0–0.5)
IMM GRANULOCYTES NFR BLD AUTO: 0 % (ref 0–5)
LYMPHOCYTES # BLD: 2.7 K/UL (ref 0.5–4.6)
LYMPHOCYTES NFR BLD: 39 % (ref 13–44)
MAGNESIUM SERPL-MCNC: 1.7 MG/DL (ref 1.8–2.4)
MCH RBC QN AUTO: 28.2 PG (ref 26.1–32.9)
MCHC RBC AUTO-ENTMCNC: 30.7 G/DL (ref 31.4–35)
MCV RBC AUTO: 91.8 FL (ref 79.6–97.8)
MONOCYTES # BLD: 1.1 K/UL (ref 0.1–1.3)
MONOCYTES NFR BLD: 15 % (ref 4–12)
NEUTS SEG # BLD: 3 K/UL (ref 1.7–8.2)
NEUTS SEG NFR BLD: 43 % (ref 43–78)
NRBC # BLD: 0.03 K/UL (ref 0–0.2)
PLATELET # BLD AUTO: 258 K/UL (ref 150–450)
PMV BLD AUTO: 10.7 FL (ref 9.4–12.3)
POTASSIUM SERPL-SCNC: 3.9 MMOL/L (ref 3.5–5.1)
PROT SERPL-MCNC: 7.7 G/DL (ref 6.3–8.2)
RBC # BLD AUTO: 3.55 M/UL (ref 4.05–5.2)
SODIUM SERPL-SCNC: 139 MMOL/L (ref 136–145)
WBC # BLD AUTO: 7 K/UL (ref 4.3–11.1)

## 2021-11-22 PROCEDURE — 83735 ASSAY OF MAGNESIUM: CPT

## 2021-11-22 PROCEDURE — 80053 COMPREHEN METABOLIC PANEL: CPT

## 2021-11-22 PROCEDURE — 85025 COMPLETE CBC W/AUTO DIFF WBC: CPT

## 2021-11-22 PROCEDURE — 36415 COLL VENOUS BLD VENIPUNCTURE: CPT

## 2021-11-22 NOTE — PROGRESS NOTES
11/22/2021 Saw the patient with Eden Yan NP. She is due cycle 10 of folfirinox. She remains unsteady on her feet which is not new and she  does seen neurologist this week. She does have cold sensitivity with treatment but it does resolve between treatments. She has insomnia on treatment night and has tried Benadryl and melatonin so we will send in Ativan to see if this helps. She is eating and drinking well. Navigation will continue to follow.

## 2021-11-23 ENCOUNTER — HOSPITAL ENCOUNTER (OUTPATIENT)
Dept: INFUSION THERAPY | Age: 76
Discharge: HOME | End: 2021-11-23
Payer: MEDICARE

## 2021-11-23 VITALS
OXYGEN SATURATION: 98 % | WEIGHT: 118.8 LBS | BODY MASS INDEX: 23.2 KG/M2 | RESPIRATION RATE: 16 BRPM | HEART RATE: 82 BPM | SYSTOLIC BLOOD PRESSURE: 138 MMHG | TEMPERATURE: 98.5 F | DIASTOLIC BLOOD PRESSURE: 75 MMHG

## 2021-11-23 DIAGNOSIS — C25.1 MALIGNANT NEOPLASM OF BODY OF PANCREAS (HCC): Primary | ICD-10-CM

## 2021-11-23 PROCEDURE — 96375 TX/PRO/DX INJ NEW DRUG ADDON: CPT

## 2021-11-23 PROCEDURE — 74011000258 HC RX REV CODE- 258: Performed by: NURSE PRACTITIONER

## 2021-11-23 PROCEDURE — 96415 CHEMO IV INFUSION ADDL HR: CPT

## 2021-11-23 PROCEDURE — G0498 CHEMO EXTEND IV INFUS W/PUMP: HCPCS

## 2021-11-23 PROCEDURE — 96417 CHEMO IV INFUS EACH ADDL SEQ: CPT

## 2021-11-23 PROCEDURE — 74011250636 HC RX REV CODE- 250/636: Performed by: NURSE PRACTITIONER

## 2021-11-23 PROCEDURE — 96413 CHEMO IV INFUSION 1 HR: CPT

## 2021-11-23 PROCEDURE — 96368 THER/DIAG CONCURRENT INF: CPT

## 2021-11-23 PROCEDURE — 96372 THER/PROPH/DIAG INJ SC/IM: CPT

## 2021-11-23 RX ORDER — ATROPINE SULFATE 0.4 MG/ML
0.4 INJECTION, SOLUTION ENDOTRACHEAL; INTRAMEDULLARY; INTRAMUSCULAR; INTRAVENOUS; SUBCUTANEOUS
Status: CANCELLED | OUTPATIENT
Start: 2021-11-23

## 2021-11-23 RX ORDER — SODIUM CHLORIDE 9 MG/ML
10 INJECTION INTRAMUSCULAR; INTRAVENOUS; SUBCUTANEOUS AS NEEDED
Status: CANCELLED | OUTPATIENT
Start: 2021-11-23

## 2021-11-23 RX ORDER — DIPHENHYDRAMINE HYDROCHLORIDE 50 MG/ML
25 INJECTION, SOLUTION INTRAMUSCULAR; INTRAVENOUS AS NEEDED
Status: CANCELLED
Start: 2021-11-23

## 2021-11-23 RX ORDER — DIPHENHYDRAMINE HYDROCHLORIDE 50 MG/ML
50 INJECTION, SOLUTION INTRAMUSCULAR; INTRAVENOUS AS NEEDED
Status: CANCELLED
Start: 2021-11-23

## 2021-11-23 RX ORDER — HEPARIN 100 UNIT/ML
300-500 SYRINGE INTRAVENOUS AS NEEDED
Status: CANCELLED
Start: 2021-11-23

## 2021-11-23 RX ORDER — ALBUTEROL SULFATE 0.83 MG/ML
2.5 SOLUTION RESPIRATORY (INHALATION) AS NEEDED
Status: CANCELLED
Start: 2021-11-23

## 2021-11-23 RX ORDER — HYDROCORTISONE SODIUM SUCCINATE 100 MG/2ML
100 INJECTION, POWDER, FOR SOLUTION INTRAMUSCULAR; INTRAVENOUS AS NEEDED
Status: CANCELLED | OUTPATIENT
Start: 2021-11-23

## 2021-11-23 RX ORDER — SODIUM CHLORIDE 9 MG/ML
10 INJECTION INTRAMUSCULAR; INTRAVENOUS; SUBCUTANEOUS AS NEEDED
Status: CANCELLED | OUTPATIENT
Start: 2021-11-25

## 2021-11-23 RX ORDER — SODIUM CHLORIDE 0.9 % (FLUSH) 0.9 %
10 SYRINGE (ML) INJECTION AS NEEDED
Status: CANCELLED | OUTPATIENT
Start: 2021-11-25

## 2021-11-23 RX ORDER — ATROPINE SULFATE 0.4 MG/ML
0.4 INJECTION, SOLUTION ENDOTRACHEAL; INTRAMEDULLARY; INTRAMUSCULAR; INTRAVENOUS; SUBCUTANEOUS ONCE
Status: COMPLETED | OUTPATIENT
Start: 2021-11-23 | End: 2021-11-23

## 2021-11-23 RX ORDER — ONDANSETRON 2 MG/ML
8 INJECTION INTRAMUSCULAR; INTRAVENOUS ONCE
Status: COMPLETED | OUTPATIENT
Start: 2021-11-23 | End: 2021-11-23

## 2021-11-23 RX ORDER — SODIUM CHLORIDE 0.9 % (FLUSH) 0.9 %
10 SYRINGE (ML) INJECTION AS NEEDED
Status: ACTIVE | OUTPATIENT
Start: 2021-11-23 | End: 2021-11-23

## 2021-11-23 RX ORDER — ONDANSETRON 2 MG/ML
8 INJECTION INTRAMUSCULAR; INTRAVENOUS AS NEEDED
Status: CANCELLED | OUTPATIENT
Start: 2021-11-23

## 2021-11-23 RX ORDER — ACETAMINOPHEN 325 MG/1
650 TABLET ORAL AS NEEDED
Status: CANCELLED
Start: 2021-11-23

## 2021-11-23 RX ORDER — EPINEPHRINE 1 MG/ML
0.3 INJECTION, SOLUTION, CONCENTRATE INTRAVENOUS AS NEEDED
Status: CANCELLED | OUTPATIENT
Start: 2021-11-23

## 2021-11-23 RX ORDER — DEXTROSE MONOHYDRATE 50 MG/ML
25 INJECTION, SOLUTION INTRAVENOUS CONTINUOUS
Status: ACTIVE | OUTPATIENT
Start: 2021-11-23 | End: 2021-11-23

## 2021-11-23 RX ORDER — HEPARIN 100 UNIT/ML
300-500 SYRINGE INTRAVENOUS AS NEEDED
Status: CANCELLED
Start: 2021-11-25

## 2021-11-23 RX ADMIN — FLUOROURACIL 3500 MG: 50 INJECTION, SOLUTION INTRAVENOUS at 12:43

## 2021-11-23 RX ADMIN — Medication 10 ML: at 08:00

## 2021-11-23 RX ADMIN — DEXTROSE MONOHYDRATE 25 ML/HR: 5 INJECTION, SOLUTION INTRAVENOUS at 08:00

## 2021-11-23 RX ADMIN — OXALIPLATIN 73 MG: 5 INJECTION, SOLUTION INTRAVENOUS at 08:47

## 2021-11-23 RX ADMIN — ATROPINE SULFATE 0.4 MG: 0.4 INJECTION, SOLUTION INTRAMUSCULAR; INTRAVENOUS; SUBCUTANEOUS at 10:32

## 2021-11-23 RX ADMIN — IRINOTECAN HYDROCHLORIDE 180 MG: 20 INJECTION, SOLUTION INTRAVENOUS at 11:01

## 2021-11-23 RX ADMIN — ONDANSETRON 8 MG: 2 INJECTION INTRAMUSCULAR; INTRAVENOUS at 08:07

## 2021-11-23 RX ADMIN — DEXAMETHASONE SODIUM PHOSPHATE 12 MG: 4 INJECTION, SOLUTION INTRAMUSCULAR; INTRAVENOUS at 08:23

## 2021-11-23 RX ADMIN — DEXTROSE MONOHYDRATE 608 MG: 50 INJECTION, SOLUTION INTRAVENOUS at 11:00

## 2021-11-23 NOTE — ADDENDUM NOTE
Encounter addended by: Israel León, [facility] on: 11/23/2021 7:58 AM   Actions taken: i-Vent created or edited

## 2021-11-23 NOTE — PROGRESS NOTES
Pt arrived ambulatory to [facility]. Port accessed with good blood return. D5 infusing. Pre meds given as ordered. Oxaliplatin infusing. Atropine SQ. Irinotean and leucovorin infusing. Pump infusing at 1245. Pt aware of next appt on 11/25 21 at 1430 but will come at 1100. Aware there may be a wait. Discharged ambulatory.

## 2021-11-24 PROBLEM — E11.9 TYPE 2 DIABETES MELLITUS (HCC): Status: ACTIVE | Noted: 2021-11-24

## 2021-11-25 ENCOUNTER — HOSPITAL ENCOUNTER (OUTPATIENT)
Dept: INFUSION THERAPY | Age: 76
Discharge: HOME | End: 2021-11-25
Payer: MEDICARE

## 2021-11-25 VITALS
DIASTOLIC BLOOD PRESSURE: 74 MMHG | HEART RATE: 67 BPM | RESPIRATION RATE: 16 BRPM | TEMPERATURE: 97.6 F | OXYGEN SATURATION: 94 % | SYSTOLIC BLOOD PRESSURE: 135 MMHG

## 2021-11-25 DIAGNOSIS — C25.1 MALIGNANT NEOPLASM OF BODY OF PANCREAS (HCC): Primary | ICD-10-CM

## 2021-11-25 PROCEDURE — 96523 IRRIG DRUG DELIVERY DEVICE: CPT

## 2021-11-25 RX ORDER — SODIUM CHLORIDE 0.9 % (FLUSH) 0.9 %
10 SYRINGE (ML) INJECTION AS NEEDED
Status: ACTIVE | OUTPATIENT
Start: 2021-11-25 | End: 2021-11-25

## 2021-11-25 RX ADMIN — Medication 10 ML: at 11:56

## 2021-11-25 NOTE — PROGRESS NOTES
Arrived to the [facility]. Adrucil pump completed. Provided education on oral hydration    Patient instructed to report any side affects to ordering provider. Patient tolerated without problems. Any issues or concerns during appointment: no.  Patient aware of next infusion appointment on 12/7/21 (date) at 0800 (time). Discharged ambulatory with family.

## 2021-12-06 ENCOUNTER — HOSPITAL ENCOUNTER (OUTPATIENT)
Dept: LAB | Age: 76
Discharge: HOME | End: 2021-12-06
Payer: MEDICARE

## 2021-12-06 DIAGNOSIS — C25.1 MALIGNANT NEOPLASM OF BODY OF PANCREAS (HCC): ICD-10-CM

## 2021-12-06 LAB
ALBUMIN SERPL-MCNC: 2.8 G/DL (ref 3.2–4.6)
ALBUMIN/GLOB SERPL: 0.6 {RATIO} (ref 1.2–3.5)
ALP SERPL-CCNC: 114 U/L (ref 50–136)
ALT SERPL-CCNC: 25 U/L (ref 12–65)
ANION GAP SERPL CALC-SCNC: 6 MMOL/L (ref 7–16)
AST SERPL-CCNC: 35 U/L (ref 15–37)
BASOPHILS # BLD: 0 K/UL (ref 0–0.2)
BASOPHILS NFR BLD: 0 % (ref 0–2)
BILIRUB SERPL-MCNC: 0.3 MG/DL (ref 0.2–1.1)
BUN SERPL-MCNC: 13 MG/DL (ref 8–23)
CALCIUM SERPL-MCNC: 8.8 MG/DL (ref 8.3–10.4)
CANCER AG19-9 SERPL-ACNC: 27.8 U/ML (ref 2–37)
CHLORIDE SERPL-SCNC: 105 MMOL/L (ref 98–107)
CO2 SERPL-SCNC: 28 MMOL/L (ref 21–32)
CREAT SERPL-MCNC: 1 MG/DL (ref 0.6–1)
DIFFERENTIAL METHOD BLD: ABNORMAL
EOSINOPHIL # BLD: 0.1 K/UL (ref 0–0.8)
EOSINOPHIL NFR BLD: 1 % (ref 0.5–7.8)
ERYTHROCYTE [DISTWIDTH] IN BLOOD BY AUTOMATED COUNT: 20 % (ref 11.9–14.6)
GLOBULIN SER CALC-MCNC: 4.9 G/DL (ref 2.3–3.5)
GLUCOSE SERPL-MCNC: 188 MG/DL (ref 65–100)
HCT VFR BLD AUTO: 31.3 % (ref 35.8–46.3)
HGB BLD-MCNC: 9.8 G/DL (ref 11.7–15.4)
IMM GRANULOCYTES # BLD AUTO: 0 K/UL (ref 0–0.5)
IMM GRANULOCYTES NFR BLD AUTO: 0 % (ref 0–5)
LYMPHOCYTES # BLD: 5.6 K/UL (ref 0.5–4.6)
LYMPHOCYTES NFR BLD: 61 % (ref 13–44)
MAGNESIUM SERPL-MCNC: 1.5 MG/DL (ref 1.8–2.4)
MCH RBC QN AUTO: 28.5 PG (ref 26.1–32.9)
MCHC RBC AUTO-ENTMCNC: 31.3 G/DL (ref 31.4–35)
MCV RBC AUTO: 91 FL (ref 79.6–97.8)
MONOCYTES # BLD: 1.5 K/UL (ref 0.1–1.3)
MONOCYTES NFR BLD: 16 % (ref 4–12)
NEUTS SEG # BLD: 2 K/UL (ref 1.7–8.2)
NEUTS SEG NFR BLD: 22 % (ref 43–78)
NRBC # BLD: 0 K/UL (ref 0–0.2)
PLATELET # BLD AUTO: 273 K/UL (ref 150–450)
PMV BLD AUTO: 11.1 FL (ref 9.4–12.3)
POTASSIUM SERPL-SCNC: 3.7 MMOL/L (ref 3.5–5.1)
PROT SERPL-MCNC: 7.7 G/DL (ref 6.3–8.2)
RBC # BLD AUTO: 3.44 M/UL (ref 4.05–5.2)
SODIUM SERPL-SCNC: 139 MMOL/L (ref 136–145)
WBC # BLD AUTO: 9.1 K/UL (ref 4.3–11.1)

## 2021-12-06 PROCEDURE — 36415 COLL VENOUS BLD VENIPUNCTURE: CPT

## 2021-12-06 PROCEDURE — 85025 COMPLETE CBC W/AUTO DIFF WBC: CPT

## 2021-12-06 PROCEDURE — 80053 COMPREHEN METABOLIC PANEL: CPT

## 2021-12-06 PROCEDURE — 83735 ASSAY OF MAGNESIUM: CPT

## 2021-12-06 PROCEDURE — 86301 IMMUNOASSAY TUMOR CA 19-9: CPT

## 2021-12-07 ENCOUNTER — HOSPITAL ENCOUNTER (OUTPATIENT)
Dept: INFUSION THERAPY | Age: 76
Discharge: HOME | End: 2021-12-07
Payer: MEDICARE

## 2021-12-07 VITALS
SYSTOLIC BLOOD PRESSURE: 136 MMHG | BODY MASS INDEX: 22.97 KG/M2 | TEMPERATURE: 98.4 F | OXYGEN SATURATION: 93 % | HEART RATE: 86 BPM | RESPIRATION RATE: 16 BRPM | DIASTOLIC BLOOD PRESSURE: 69 MMHG | WEIGHT: 117.6 LBS

## 2021-12-07 DIAGNOSIS — E87.6 HYPOKALEMIA: ICD-10-CM

## 2021-12-07 DIAGNOSIS — C25.1 MALIGNANT NEOPLASM OF BODY OF PANCREAS (HCC): ICD-10-CM

## 2021-12-07 DIAGNOSIS — E83.42 HYPOMAGNESEMIA: Primary | ICD-10-CM

## 2021-12-07 PROCEDURE — 96417 CHEMO IV INFUS EACH ADDL SEQ: CPT

## 2021-12-07 PROCEDURE — 96368 THER/DIAG CONCURRENT INF: CPT

## 2021-12-07 PROCEDURE — 74011250636 HC RX REV CODE- 250/636: Performed by: INTERNAL MEDICINE

## 2021-12-07 PROCEDURE — 96375 TX/PRO/DX INJ NEW DRUG ADDON: CPT

## 2021-12-07 PROCEDURE — 96372 THER/PROPH/DIAG INJ SC/IM: CPT

## 2021-12-07 PROCEDURE — 74011000258 HC RX REV CODE- 258: Performed by: INTERNAL MEDICINE

## 2021-12-07 PROCEDURE — 96415 CHEMO IV INFUSION ADDL HR: CPT

## 2021-12-07 PROCEDURE — G0498 CHEMO EXTEND IV INFUS W/PUMP: HCPCS

## 2021-12-07 PROCEDURE — 96413 CHEMO IV INFUSION 1 HR: CPT

## 2021-12-07 RX ORDER — ATROPINE SULFATE 0.4 MG/ML
0.4 INJECTION, SOLUTION ENDOTRACHEAL; INTRAMEDULLARY; INTRAMUSCULAR; INTRAVENOUS; SUBCUTANEOUS ONCE
Status: COMPLETED | OUTPATIENT
Start: 2021-12-07 | End: 2021-12-07

## 2021-12-07 RX ORDER — ONDANSETRON 2 MG/ML
8 INJECTION INTRAMUSCULAR; INTRAVENOUS ONCE
Status: COMPLETED | OUTPATIENT
Start: 2021-12-07 | End: 2021-12-07

## 2021-12-07 RX ORDER — DEXTROSE MONOHYDRATE 50 MG/ML
25 INJECTION, SOLUTION INTRAVENOUS CONTINUOUS
Status: ACTIVE | OUTPATIENT
Start: 2021-12-07 | End: 2021-12-07

## 2021-12-07 RX ORDER — SODIUM CHLORIDE 0.9 % (FLUSH) 0.9 %
10 SYRINGE (ML) INJECTION AS NEEDED
Status: ACTIVE | OUTPATIENT
Start: 2021-12-07 | End: 2021-12-07

## 2021-12-07 RX ADMIN — DEXAMETHASONE SODIUM PHOSPHATE 12 MG: 4 INJECTION, SOLUTION INTRAMUSCULAR; INTRAVENOUS at 08:56

## 2021-12-07 RX ADMIN — FLUOROURACIL 3500 MG: 50 INJECTION, SOLUTION INTRAVENOUS at 14:10

## 2021-12-07 RX ADMIN — Medication 10 ML: at 08:55

## 2021-12-07 RX ADMIN — DEXTROSE MONOHYDRATE 25 ML/HR: 5 INJECTION, SOLUTION INTRAVENOUS at 08:56

## 2021-12-07 RX ADMIN — LEUCOVORIN CALCIUM 608 MG: 350 INJECTION, POWDER, LYOPHILIZED, FOR SOLUTION INTRAMUSCULAR; INTRAVENOUS at 12:17

## 2021-12-07 RX ADMIN — OXALIPLATIN 73 MG: 5 INJECTION, SOLUTION INTRAVENOUS at 10:04

## 2021-12-07 RX ADMIN — ONDANSETRON 8 MG: 2 INJECTION INTRAMUSCULAR; INTRAVENOUS at 08:55

## 2021-12-07 RX ADMIN — IRINOTECAN HYDROCHLORIDE 180 MG: 20 INJECTION, SOLUTION INTRAVENOUS at 12:18

## 2021-12-07 RX ADMIN — ATROPINE SULFATE 0.4 MG: 0.4 INJECTION, SOLUTION INTRAMUSCULAR; INTRAVENOUS; SUBCUTANEOUS at 11:29

## 2021-12-07 NOTE — PROGRESS NOTES
Arrived to the [facility]. FOLFIRINOX completed. Patient tolerated well. Any issues or concerns during appointment: none. Patient aware of next infusion appointment on 12/9/2021 at 4:30pm.  Discharged ambulatory with cane.

## 2021-12-07 NOTE — ADDENDUM NOTE
Encounter addended by: Javier Whyte [facility] on: 12/7/2021 8:42 AM   Actions taken: i-Vent created or edited

## 2021-12-09 ENCOUNTER — HOSPITAL ENCOUNTER (OUTPATIENT)
Dept: INFUSION THERAPY | Age: 76
Discharge: HOME | End: 2021-12-09
Payer: MEDICARE

## 2021-12-09 VITALS
SYSTOLIC BLOOD PRESSURE: 118 MMHG | HEART RATE: 78 BPM | RESPIRATION RATE: 18 BRPM | OXYGEN SATURATION: 95 % | TEMPERATURE: 98.5 F | DIASTOLIC BLOOD PRESSURE: 66 MMHG

## 2021-12-09 PROCEDURE — 96523 IRRIG DRUG DELIVERY DEVICE: CPT

## 2021-12-09 RX ORDER — SODIUM CHLORIDE 0.9 % (FLUSH) 0.9 %
10 SYRINGE (ML) INJECTION AS NEEDED
Status: DISCONTINUED | OUTPATIENT
Start: 2021-12-09 | End: 2021-12-11 | Stop reason: HOSPADM

## 2021-12-09 RX ADMIN — Medication 10 ML: at 16:09

## 2021-12-09 NOTE — PROGRESS NOTES
Arrived to the [facility]. D/c pump completed. Provided education on same. Patient instructed to report any side affects to ordering provider. Patient tolerated well. Any issues or concerns during appointment: none. Patient aware of next infusion appointment on 12/21  Discharged ambulatory.

## 2021-12-20 ENCOUNTER — HOSPITAL ENCOUNTER (OUTPATIENT)
Dept: LAB | Age: 76
Discharge: HOME | End: 2021-12-20
Payer: MEDICARE

## 2021-12-20 ENCOUNTER — PATIENT OUTREACH (OUTPATIENT)
Dept: CASE MANAGEMENT | Age: 76
End: 2021-12-20

## 2021-12-20 DIAGNOSIS — C25.1 MALIGNANT NEOPLASM OF BODY OF PANCREAS (HCC): ICD-10-CM

## 2021-12-20 LAB
ALBUMIN SERPL-MCNC: 2.9 G/DL (ref 3.2–4.6)
ALBUMIN/GLOB SERPL: 0.6 {RATIO} (ref 1.2–3.5)
ALP SERPL-CCNC: 117 U/L (ref 50–136)
ALT SERPL-CCNC: 23 U/L (ref 12–65)
ANION GAP SERPL CALC-SCNC: 5 MMOL/L (ref 7–16)
AST SERPL-CCNC: 36 U/L (ref 15–37)
BASOPHILS # BLD: 0 K/UL (ref 0–0.2)
BASOPHILS NFR BLD: 0 % (ref 0–2)
BILIRUB SERPL-MCNC: 0.3 MG/DL (ref 0.2–1.1)
BUN SERPL-MCNC: 12 MG/DL (ref 8–23)
CALCIUM SERPL-MCNC: 8.9 MG/DL (ref 8.3–10.4)
CHLORIDE SERPL-SCNC: 106 MMOL/L (ref 98–107)
CO2 SERPL-SCNC: 30 MMOL/L (ref 21–32)
CREAT SERPL-MCNC: 1.2 MG/DL (ref 0.6–1)
DIFFERENTIAL METHOD BLD: ABNORMAL
EOSINOPHIL # BLD: 0 K/UL (ref 0–0.8)
EOSINOPHIL NFR BLD: 0 % (ref 0.5–7.8)
ERYTHROCYTE [DISTWIDTH] IN BLOOD BY AUTOMATED COUNT: 19.7 % (ref 11.9–14.6)
GLOBULIN SER CALC-MCNC: 4.8 G/DL (ref 2.3–3.5)
GLUCOSE SERPL-MCNC: 167 MG/DL (ref 65–100)
HCT VFR BLD AUTO: 31.3 % (ref 35.8–46.3)
HGB BLD-MCNC: 9.7 G/DL (ref 11.7–15.4)
IMM GRANULOCYTES # BLD AUTO: 0 K/UL (ref 0–0.5)
IMM GRANULOCYTES NFR BLD AUTO: 0 % (ref 0–5)
LYMPHOCYTES # BLD: 5.8 K/UL (ref 0.5–4.6)
LYMPHOCYTES NFR BLD: 60 % (ref 13–44)
MAGNESIUM SERPL-MCNC: 1.6 MG/DL (ref 1.8–2.4)
MCH RBC QN AUTO: 28.8 PG (ref 26.1–32.9)
MCHC RBC AUTO-ENTMCNC: 31 G/DL (ref 31.4–35)
MCV RBC AUTO: 92.9 FL (ref 79.6–97.8)
MONOCYTES # BLD: 1.8 K/UL (ref 0.1–1.3)
MONOCYTES NFR BLD: 19 % (ref 4–12)
NEUTS SEG # BLD: 1.9 K/UL (ref 1.7–8.2)
NEUTS SEG NFR BLD: 20 % (ref 43–78)
NRBC # BLD: 0 K/UL (ref 0–0.2)
PLATELET # BLD AUTO: 247 K/UL (ref 150–450)
PMV BLD AUTO: 10.6 FL (ref 9.4–12.3)
POTASSIUM SERPL-SCNC: 3.6 MMOL/L (ref 3.5–5.1)
PROT SERPL-MCNC: 7.7 G/DL (ref 6.3–8.2)
RBC # BLD AUTO: 3.37 M/UL (ref 4.05–5.2)
SODIUM SERPL-SCNC: 141 MMOL/L (ref 136–145)
WBC # BLD AUTO: 9.7 K/UL (ref 4.3–11.1)

## 2021-12-20 PROCEDURE — 85025 COMPLETE CBC W/AUTO DIFF WBC: CPT

## 2021-12-20 PROCEDURE — 36415 COLL VENOUS BLD VENIPUNCTURE: CPT

## 2021-12-20 PROCEDURE — 83735 ASSAY OF MAGNESIUM: CPT

## 2021-12-20 PROCEDURE — 80053 COMPREHEN METABOLIC PANEL: CPT

## 2021-12-20 NOTE — PROGRESS NOTES
12/20/21 saw pt today with CYNTHIA Gutierrez for pre chemo cycle 12 FOLFIRINOX. She is tolerating chemo well. PO intake is good. Mouth tenderness, discussed management. Infusion tomorrow. Follow up in 4 weeks with restaging scan. Encouraged to call with any concerns. Navigation will continue to follow.

## 2021-12-21 ENCOUNTER — HOSPITAL ENCOUNTER (OUTPATIENT)
Dept: INFUSION THERAPY | Age: 76
Discharge: HOME | End: 2021-12-21
Payer: MEDICARE

## 2021-12-21 VITALS
OXYGEN SATURATION: 92 % | TEMPERATURE: 98.4 F | DIASTOLIC BLOOD PRESSURE: 64 MMHG | RESPIRATION RATE: 16 BRPM | SYSTOLIC BLOOD PRESSURE: 132 MMHG | WEIGHT: 118 LBS | BODY MASS INDEX: 23.05 KG/M2 | HEART RATE: 77 BPM

## 2021-12-21 DIAGNOSIS — E83.42 HYPOMAGNESEMIA: ICD-10-CM

## 2021-12-21 DIAGNOSIS — C25.1 MALIGNANT NEOPLASM OF BODY OF PANCREAS (HCC): Primary | ICD-10-CM

## 2021-12-21 PROCEDURE — 96372 THER/PROPH/DIAG INJ SC/IM: CPT

## 2021-12-21 PROCEDURE — 96368 THER/DIAG CONCURRENT INF: CPT

## 2021-12-21 PROCEDURE — 96415 CHEMO IV INFUSION ADDL HR: CPT

## 2021-12-21 PROCEDURE — 96367 TX/PROPH/DG ADDL SEQ IV INF: CPT

## 2021-12-21 PROCEDURE — 96417 CHEMO IV INFUS EACH ADDL SEQ: CPT

## 2021-12-21 PROCEDURE — 96375 TX/PRO/DX INJ NEW DRUG ADDON: CPT

## 2021-12-21 PROCEDURE — 74011250636 HC RX REV CODE- 250/636: Performed by: NURSE PRACTITIONER

## 2021-12-21 PROCEDURE — 96361 HYDRATE IV INFUSION ADD-ON: CPT

## 2021-12-21 PROCEDURE — 74011000258 HC RX REV CODE- 258: Performed by: NURSE PRACTITIONER

## 2021-12-21 PROCEDURE — 96413 CHEMO IV INFUSION 1 HR: CPT

## 2021-12-21 PROCEDURE — G0498 CHEMO EXTEND IV INFUS W/PUMP: HCPCS

## 2021-12-21 RX ORDER — DEXTROSE MONOHYDRATE 50 MG/ML
25 INJECTION, SOLUTION INTRAVENOUS CONTINUOUS
Status: ACTIVE | OUTPATIENT
Start: 2021-12-21 | End: 2021-12-21

## 2021-12-21 RX ORDER — SODIUM CHLORIDE 9 MG/ML
1000 INJECTION, SOLUTION INTRAVENOUS CONTINUOUS
Status: DISCONTINUED | OUTPATIENT
Start: 2021-12-21 | End: 2021-12-23 | Stop reason: HOSPADM

## 2021-12-21 RX ORDER — ATROPINE SULFATE 0.4 MG/ML
0.4 INJECTION, SOLUTION ENDOTRACHEAL; INTRAMEDULLARY; INTRAMUSCULAR; INTRAVENOUS; SUBCUTANEOUS ONCE
Status: COMPLETED | OUTPATIENT
Start: 2021-12-21 | End: 2021-12-21

## 2021-12-21 RX ORDER — SODIUM CHLORIDE 0.9 % (FLUSH) 0.9 %
10 SYRINGE (ML) INJECTION EVERY 8 HOURS
Status: DISCONTINUED | OUTPATIENT
Start: 2021-12-21 | End: 2021-12-23 | Stop reason: HOSPADM

## 2021-12-21 RX ORDER — MAGNESIUM SULFATE HEPTAHYDRATE 40 MG/ML
2 INJECTION, SOLUTION INTRAVENOUS ONCE
Status: COMPLETED | OUTPATIENT
Start: 2021-12-21 | End: 2021-12-21

## 2021-12-21 RX ORDER — ONDANSETRON 2 MG/ML
8 INJECTION INTRAMUSCULAR; INTRAVENOUS ONCE
Status: COMPLETED | OUTPATIENT
Start: 2021-12-21 | End: 2021-12-21

## 2021-12-21 RX ADMIN — ATROPINE SULFATE 0.4 MG: 0.4 INJECTION, SOLUTION INTRAMUSCULAR; INTRAVENOUS; SUBCUTANEOUS at 12:40

## 2021-12-21 RX ADMIN — IRINOTECAN HYDROCHLORIDE 180 MG: 20 INJECTION, SOLUTION INTRAVENOUS at 12:45

## 2021-12-21 RX ADMIN — Medication 10 ML: at 08:20

## 2021-12-21 RX ADMIN — ONDANSETRON 8 MG: 2 INJECTION INTRAMUSCULAR; INTRAVENOUS at 09:06

## 2021-12-21 RX ADMIN — MAGNESIUM SULFATE HEPTAHYDRATE 2 G: 40 INJECTION, SOLUTION INTRAVENOUS at 09:25

## 2021-12-21 RX ADMIN — Medication 10 ML: at 14:30

## 2021-12-21 RX ADMIN — LEUCOVORIN CALCIUM 608 MG: 350 INJECTION, POWDER, LYOPHILIZED, FOR SOLUTION INTRAMUSCULAR; INTRAVENOUS at 12:45

## 2021-12-21 RX ADMIN — FLUOROURACIL 3500 MG: 50 INJECTION, SOLUTION INTRAVENOUS at 14:30

## 2021-12-21 RX ADMIN — SODIUM CHLORIDE 1000 ML: 900 INJECTION, SOLUTION INTRAVENOUS at 08:20

## 2021-12-21 RX ADMIN — OXALIPLATIN 73 MG: 5 INJECTION, SOLUTION INTRAVENOUS at 10:25

## 2021-12-21 RX ADMIN — DEXTROSE MONOHYDRATE 25 ML/HR: 5 INJECTION, SOLUTION INTRAVENOUS at 09:40

## 2021-12-21 RX ADMIN — DEXAMETHASONE SODIUM PHOSPHATE 12 MG: 4 INJECTION, SOLUTION INTRAMUSCULAR; INTRAVENOUS at 09:08

## 2021-12-21 NOTE — ADDENDUM NOTE
Encounter addended by: Lv Shea [facility] on: 12/21/2021 8:19 AM   Actions taken: i-Vent created or edited

## 2021-12-21 NOTE — PROGRESS NOTES
Arrived to the [facility]. FOLFIRINOX  chemo infusion completed. Adrucil in Elastomeric pump connected to port to infuse over 46 hrs. Patient tolerated well. Any issues or concerns during appointment: NO.  Patient aware of next infusion appointment on 12/23/21 (date) at 65 (time). Patient aware of next lab and [facility] office visit on 01/18/22 (date) at 1400 (time). Discharged ambulatory with cane.

## 2021-12-23 ENCOUNTER — HOSPITAL ENCOUNTER (OUTPATIENT)
Dept: INFUSION THERAPY | Age: 76
Discharge: HOME | End: 2021-12-23
Payer: MEDICARE

## 2021-12-23 VITALS
HEART RATE: 70 BPM | TEMPERATURE: 98.6 F | DIASTOLIC BLOOD PRESSURE: 69 MMHG | RESPIRATION RATE: 18 BRPM | SYSTOLIC BLOOD PRESSURE: 138 MMHG | OXYGEN SATURATION: 92 % | BODY MASS INDEX: 23.04 KG/M2 | WEIGHT: 117.99 LBS

## 2021-12-23 DIAGNOSIS — C25.1 MALIGNANT NEOPLASM OF BODY OF PANCREAS (HCC): Primary | ICD-10-CM

## 2021-12-23 PROCEDURE — 96523 IRRIG DRUG DELIVERY DEVICE: CPT

## 2021-12-23 RX ORDER — SODIUM CHLORIDE 0.9 % (FLUSH) 0.9 %
10 SYRINGE (ML) INJECTION AS NEEDED
Status: DISCONTINUED | OUTPATIENT
Start: 2021-12-23 | End: 2021-12-24 | Stop reason: HOSPADM

## 2021-12-23 RX ADMIN — Medication 10 ML: at 17:10

## 2021-12-23 NOTE — PROGRESS NOTES
Pt arrived ambulatory today at [phone], to have fluorouracil pump removed. Pt tolerated without difficulty. Patient discharged via ambulatory accompanied by self. Instructed to notify physician of any problems, questions or concerns, including temp over 100.4, nausea and vomiting without relief from medications. Allowed opportunity for patient/family to ask questions. Verbalized understanding. Next appointment is Jan 18 at 1400 with Dr. Jasvir Haile.

## 2022-01-19 ENCOUNTER — HOSPITAL ENCOUNTER (OUTPATIENT)
Dept: CT IMAGING | Age: 77
Discharge: HOME | End: 2022-01-19
Attending: INTERNAL MEDICINE
Payer: MEDICARE

## 2022-01-19 DIAGNOSIS — C25.1 MALIGNANT NEOPLASM OF BODY OF PANCREAS (HCC): ICD-10-CM

## 2022-01-19 PROCEDURE — 74177 CT ABD & PELVIS W/CONTRAST: CPT

## 2022-01-19 PROCEDURE — 74011000258 HC RX REV CODE- 258: Performed by: INTERNAL MEDICINE

## 2022-01-19 PROCEDURE — 74011000636 HC RX REV CODE- 636: Performed by: INTERNAL MEDICINE

## 2022-01-19 RX ORDER — SODIUM CHLORIDE 0.9 % (FLUSH) 0.9 %
10 SYRINGE (ML) INJECTION
Status: COMPLETED | OUTPATIENT
Start: 2022-01-19 | End: 2022-01-19

## 2022-01-19 RX ADMIN — DIATRIZOATE MEGLUMINE AND DIATRIZOATE SODIUM 15 ML: 660; 100 LIQUID ORAL; RECTAL at 15:01

## 2022-01-19 RX ADMIN — Medication 10 ML: at 15:02

## 2022-01-19 RX ADMIN — IOPAMIDOL 100 ML: 755 INJECTION, SOLUTION INTRAVENOUS at 15:00

## 2022-01-19 RX ADMIN — SODIUM CHLORIDE 100 ML: 9 INJECTION, SOLUTION INTRAVENOUS at 15:01

## 2022-01-21 ENCOUNTER — PATIENT OUTREACH (OUTPATIENT)
Dept: CASE MANAGEMENT | Age: 77
End: 2022-01-21

## 2022-01-21 ENCOUNTER — HOSPITAL ENCOUNTER (OUTPATIENT)
Dept: INFUSION THERAPY | Age: 77
Discharge: HOME | End: 2022-01-21
Payer: MEDICARE

## 2022-01-21 DIAGNOSIS — C25.1 MALIGNANT NEOPLASM OF BODY OF PANCREAS (HCC): ICD-10-CM

## 2022-01-21 LAB
ALBUMIN SERPL-MCNC: 2.7 G/DL (ref 3.2–4.6)
ALBUMIN/GLOB SERPL: 0.5 {RATIO} (ref 1.2–3.5)
ALP SERPL-CCNC: 143 U/L (ref 50–136)
ALT SERPL-CCNC: 21 U/L (ref 12–65)
ANION GAP SERPL CALC-SCNC: 6 MMOL/L (ref 7–16)
AST SERPL-CCNC: 36 U/L (ref 15–37)
BASOPHILS # BLD: 0.1 K/UL (ref 0–0.2)
BASOPHILS NFR BLD: 0 % (ref 0–2)
BILIRUB SERPL-MCNC: 0.4 MG/DL (ref 0.2–1.1)
BUN SERPL-MCNC: 16 MG/DL (ref 8–23)
CALCIUM SERPL-MCNC: 9.4 MG/DL (ref 8.3–10.4)
CANCER AG19-9 SERPL-ACNC: 48.7 U/ML (ref 2–37)
CHLORIDE SERPL-SCNC: 105 MMOL/L (ref 98–107)
CO2 SERPL-SCNC: 28 MMOL/L (ref 21–32)
CREAT SERPL-MCNC: 1.1 MG/DL (ref 0.6–1)
DIFFERENTIAL METHOD BLD: ABNORMAL
EOSINOPHIL # BLD: 0.2 K/UL (ref 0–0.8)
EOSINOPHIL NFR BLD: 1 % (ref 0.5–7.8)
ERYTHROCYTE [DISTWIDTH] IN BLOOD BY AUTOMATED COUNT: 18.7 % (ref 11.9–14.6)
GLOBULIN SER CALC-MCNC: 5.4 G/DL (ref 2.3–3.5)
GLUCOSE SERPL-MCNC: 194 MG/DL (ref 65–100)
HCT VFR BLD AUTO: 35.3 % (ref 35.8–46.3)
HGB BLD-MCNC: 11 G/DL (ref 11.7–15.4)
IMM GRANULOCYTES # BLD AUTO: 0.1 K/UL (ref 0–0.5)
IMM GRANULOCYTES NFR BLD AUTO: 0 % (ref 0–5)
LYMPHOCYTES # BLD: 6.9 K/UL (ref 0.5–4.6)
LYMPHOCYTES NFR BLD: 44 % (ref 13–44)
MCH RBC QN AUTO: 28.8 PG (ref 26.1–32.9)
MCHC RBC AUTO-ENTMCNC: 31.2 G/DL (ref 31.4–35)
MCV RBC AUTO: 92.4 FL (ref 79.6–97.8)
MONOCYTES # BLD: 2 K/UL (ref 0.1–1.3)
MONOCYTES NFR BLD: 13 % (ref 4–12)
NEUTS SEG # BLD: 6.5 K/UL (ref 1.7–8.2)
NEUTS SEG NFR BLD: 42 % (ref 43–78)
NRBC # BLD: 0.02 K/UL (ref 0–0.2)
PLATELET # BLD AUTO: 412 K/UL (ref 150–450)
PMV BLD AUTO: 10.3 FL (ref 9.4–12.3)
POTASSIUM SERPL-SCNC: 4.3 MMOL/L (ref 3.5–5.1)
PROT SERPL-MCNC: 8.1 G/DL (ref 6.3–8.2)
RBC # BLD AUTO: 3.82 M/UL (ref 4.05–5.2)
SODIUM SERPL-SCNC: 139 MMOL/L (ref 136–145)
WBC # BLD AUTO: 15.7 K/UL (ref 4.3–11.1)

## 2022-01-21 PROCEDURE — 36591 DRAW BLOOD OFF VENOUS DEVICE: CPT

## 2022-01-21 PROCEDURE — 85025 COMPLETE CBC W/AUTO DIFF WBC: CPT

## 2022-01-21 PROCEDURE — 80053 COMPREHEN METABOLIC PANEL: CPT

## 2022-01-21 PROCEDURE — 86301 IMMUNOASSAY TUMOR CA 19-9: CPT

## 2022-01-21 RX ORDER — SODIUM CHLORIDE 0.9 % (FLUSH) 0.9 %
10 SYRINGE (ML) INJECTION AS NEEDED
Status: DISCONTINUED | OUTPATIENT
Start: 2022-01-21 | End: 2022-01-23 | Stop reason: HOSPADM

## 2022-01-21 RX ADMIN — Medication 10 ML: at 08:01

## 2022-01-21 NOTE — PROGRESS NOTES
1/21/22 saw pt today with Dr. Haydee Kussmaul for follow up pancreatic cancer. Restaging scan shows no disease. She is recovering well from chemo. She is reporting a productive cough, advised to contact PCP for management. Will arrange port removal with Dr. Sada Stringer.  Follow up in 3 months with restaging scan and labs. Encouraged to call office for any questions or concerns. Navigation will sign off.

## 2022-01-21 NOTE — PROGRESS NOTES
Patient arrived to port lab for port access and lab draw   Port accessed and labs drawn per protocol   port flushed and deaccessed  Patient discharged from port lab ambulatory*

## 2022-03-18 PROBLEM — K21.9 GASTROESOPHAGEAL REFLUX DISEASE WITHOUT ESOPHAGITIS: Status: ACTIVE | Noted: 2018-05-15

## 2022-03-18 PROBLEM — E78.2 MIXED HYPERLIPIDEMIA: Status: ACTIVE | Noted: 2019-01-15

## 2022-03-19 PROBLEM — G56.03 CARPAL TUNNEL SYNDROME, BILATERAL: Status: ACTIVE | Noted: 2019-04-15

## 2022-03-19 PROBLEM — R29.818 SUSPECTED SLEEP APNEA: Status: ACTIVE | Noted: 2021-05-28

## 2022-03-19 PROBLEM — E83.42 HYPOMAGNESEMIA: Status: ACTIVE | Noted: 2021-10-26

## 2022-03-19 PROBLEM — J90 PLEURAL EFFUSION: Status: ACTIVE | Noted: 2021-05-28

## 2022-03-19 PROBLEM — J90 PLEURAL EFFUSION ON LEFT: Status: ACTIVE | Noted: 2021-05-28

## 2022-03-19 PROBLEM — D49.0 IPMN (INTRADUCTAL PAPILLARY MUCINOUS NEOPLASM): Status: ACTIVE | Noted: 2021-05-05

## 2022-03-20 PROBLEM — E87.6 HYPOKALEMIA: Status: ACTIVE | Noted: 2021-10-26

## 2022-03-20 PROBLEM — E11.9 TYPE 2 DIABETES MELLITUS (HCC): Status: ACTIVE | Noted: 2021-11-24

## 2022-03-20 PROBLEM — E13.9 DIABETES 1.5, MANAGED AS TYPE 2 (HCC): Status: ACTIVE | Noted: 2020-06-20

## 2022-03-20 PROBLEM — C25.1 MALIGNANT NEOPLASM OF BODY OF PANCREAS (HCC): Status: ACTIVE | Noted: 2021-05-18

## 2022-04-21 ENCOUNTER — HOSPITAL ENCOUNTER (OUTPATIENT)
Dept: CT IMAGING | Age: 77
Discharge: HOME | End: 2022-04-21
Attending: INTERNAL MEDICINE
Payer: MEDICARE

## 2022-04-21 DIAGNOSIS — C25.1 MALIGNANT NEOPLASM OF BODY OF PANCREAS (HCC): ICD-10-CM

## 2022-04-21 LAB — CREAT BLD-MCNC: 1.04 MG/DL (ref 0.8–1.5)

## 2022-04-21 PROCEDURE — 74011000636 HC RX REV CODE- 636: Performed by: INTERNAL MEDICINE

## 2022-04-21 PROCEDURE — 74177 CT ABD & PELVIS W/CONTRAST: CPT

## 2022-04-21 PROCEDURE — 74011000258 HC RX REV CODE- 258: Performed by: INTERNAL MEDICINE

## 2022-04-21 PROCEDURE — 82565 ASSAY OF CREATININE: CPT

## 2022-04-21 RX ORDER — SODIUM CHLORIDE 0.9 % (FLUSH) 0.9 %
10 SYRINGE (ML) INJECTION
Status: COMPLETED | OUTPATIENT
Start: 2022-04-21 | End: 2022-04-21

## 2022-04-21 RX ADMIN — IOPAMIDOL 100 ML: 755 INJECTION, SOLUTION INTRAVENOUS at 10:42

## 2022-04-21 RX ADMIN — Medication 10 ML: at 10:42

## 2022-04-21 RX ADMIN — SODIUM CHLORIDE 100 ML: 9 INJECTION, SOLUTION INTRAVENOUS at 10:42

## 2022-04-22 ENCOUNTER — HOSPITAL ENCOUNTER (OUTPATIENT)
Dept: LAB | Age: 77
Discharge: HOME | End: 2022-04-22
Payer: MEDICARE

## 2022-04-22 DIAGNOSIS — C25.1 MALIGNANT NEOPLASM OF BODY OF PANCREAS (HCC): ICD-10-CM

## 2022-04-22 DIAGNOSIS — C25.1 MALIGNANT NEOPLASM OF BODY OF PANCREAS (HCC): Primary | ICD-10-CM

## 2022-04-22 LAB
ALBUMIN SERPL-MCNC: 3.3 G/DL (ref 3.2–4.6)
ALBUMIN/GLOB SERPL: 0.8 {RATIO} (ref 1.2–3.5)
ALP SERPL-CCNC: 89 U/L (ref 50–136)
ALT SERPL-CCNC: 22 U/L (ref 12–65)
ANION GAP SERPL CALC-SCNC: 5 MMOL/L (ref 7–16)
AST SERPL-CCNC: 23 U/L (ref 15–37)
BASOPHILS # BLD: 0.2 K/UL (ref 0–0.2)
BASOPHILS NFR BLD: 1 % (ref 0–2)
BILIRUB SERPL-MCNC: 0.4 MG/DL (ref 0.2–1.1)
BUN SERPL-MCNC: 23 MG/DL (ref 8–23)
CALCIUM SERPL-MCNC: 9.6 MG/DL (ref 8.3–10.4)
CANCER AG19-9 SERPL-ACNC: 32.8 U/ML (ref 2–37)
CHLORIDE SERPL-SCNC: 100 MMOL/L (ref 98–107)
CO2 SERPL-SCNC: 31 MMOL/L (ref 21–32)
CREAT SERPL-MCNC: 1.4 MG/DL (ref 0.6–1)
DIFFERENTIAL METHOD BLD: ABNORMAL
EOSINOPHIL # BLD: 0.3 K/UL (ref 0–0.8)
EOSINOPHIL NFR BLD: 2 % (ref 0.5–7.8)
ERYTHROCYTE [DISTWIDTH] IN BLOOD BY AUTOMATED COUNT: 15 % (ref 11.9–14.6)
GLOBULIN SER CALC-MCNC: 4.3 G/DL (ref 2.3–3.5)
GLUCOSE SERPL-MCNC: 274 MG/DL (ref 65–100)
HCT VFR BLD AUTO: 38.7 % (ref 35.8–46.3)
HGB BLD-MCNC: 12.3 G/DL (ref 11.7–15.4)
IMM GRANULOCYTES # BLD AUTO: 0 K/UL (ref 0–0.5)
IMM GRANULOCYTES NFR BLD AUTO: 0 % (ref 0–5)
LYMPHOCYTES # BLD: 5.4 K/UL (ref 0.5–4.6)
LYMPHOCYTES NFR BLD: 35 % (ref 13–44)
MCH RBC QN AUTO: 29.5 PG (ref 26.1–32.9)
MCHC RBC AUTO-ENTMCNC: 31.8 G/DL (ref 31.4–35)
MCV RBC AUTO: 92.8 FL (ref 79.6–97.8)
MONOCYTES # BLD: 1.2 K/UL (ref 0.1–1.3)
MONOCYTES NFR BLD: 8 % (ref 4–12)
NEUTS SEG # BLD: 8.3 K/UL (ref 1.7–8.2)
NEUTS SEG NFR BLD: 54 % (ref 43–78)
NRBC # BLD: 0 K/UL (ref 0–0.2)
PLATELET # BLD AUTO: 290 K/UL (ref 150–450)
PLATELET COMMENTS,PCOM: ADEQUATE
PMV BLD AUTO: 10 FL (ref 9.4–12.3)
POTASSIUM SERPL-SCNC: 4.9 MMOL/L (ref 3.5–5.1)
PROT SERPL-MCNC: 7.6 G/DL (ref 6.3–8.2)
RBC # BLD AUTO: 4.17 M/UL (ref 4.05–5.2)
RBC MORPH BLD: ABNORMAL
SODIUM SERPL-SCNC: 136 MMOL/L (ref 136–145)
WBC # BLD AUTO: 15.4 K/UL (ref 4.3–11.1)
WBC MORPH BLD: ABNORMAL

## 2022-04-22 PROCEDURE — 80053 COMPREHEN METABOLIC PANEL: CPT

## 2022-04-22 PROCEDURE — 36415 COLL VENOUS BLD VENIPUNCTURE: CPT

## 2022-04-22 PROCEDURE — 86301 IMMUNOASSAY TUMOR CA 19-9: CPT

## 2022-04-22 PROCEDURE — 85025 COMPLETE CBC W/AUTO DIFF WBC: CPT

## 2022-05-16 PROBLEM — N18.30 CHRONIC RENAL DISEASE, STAGE III (HCC): Status: ACTIVE | Noted: 2022-05-16

## 2022-07-28 ENCOUNTER — HOSPITAL ENCOUNTER (OUTPATIENT)
Dept: CT IMAGING | Age: 77
Discharge: HOME | End: 2022-07-31
Payer: MEDICARE

## 2022-07-28 DIAGNOSIS — C25.1 MALIGNANT NEOPLASM OF BODY OF PANCREAS (HCC): ICD-10-CM

## 2022-07-28 LAB — CREAT BLD-MCNC: 1.53 MG/DL (ref 0.8–1.5)

## 2022-07-28 PROCEDURE — 6360000004 HC RX CONTRAST MEDICATION: Performed by: INTERNAL MEDICINE

## 2022-07-28 PROCEDURE — 2580000003 HC RX 258: Performed by: INTERNAL MEDICINE

## 2022-07-28 PROCEDURE — 82565 ASSAY OF CREATININE: CPT

## 2022-07-28 PROCEDURE — 74177 CT ABD & PELVIS W/CONTRAST: CPT

## 2022-07-28 RX ORDER — SODIUM CHLORIDE 0.9 % (FLUSH) 0.9 %
10 SYRINGE (ML) INJECTION
Status: COMPLETED | OUTPATIENT
Start: 2022-07-28 | End: 2022-07-28

## 2022-07-28 RX ORDER — 0.9 % SODIUM CHLORIDE 0.9 %
100 INTRAVENOUS SOLUTION INTRAVENOUS
Status: COMPLETED | OUTPATIENT
Start: 2022-07-28 | End: 2022-07-28

## 2022-07-28 RX ADMIN — SODIUM CHLORIDE 100 ML: 9 INJECTION, SOLUTION INTRAVENOUS at 11:26

## 2022-07-28 RX ADMIN — DIATRIZOATE MEGLUMINE AND DIATRIZOATE SODIUM 15 ML: 660; 100 LIQUID ORAL; RECTAL at 11:26

## 2022-07-28 RX ADMIN — IOPAMIDOL 100 ML: 755 INJECTION, SOLUTION INTRAVENOUS at 11:26

## 2022-07-28 RX ADMIN — SODIUM CHLORIDE, PRESERVATIVE FREE 10 ML: 5 INJECTION INTRAVENOUS at 11:26

## 2022-08-01 ENCOUNTER — HOSPITAL ENCOUNTER (OUTPATIENT)
Dept: LAB | Age: 77
Discharge: HOME | End: 2022-08-04
Payer: MEDICARE

## 2022-08-01 ENCOUNTER — OFFICE VISIT (OUTPATIENT)
Dept: ONCOLOGY | Age: 77
End: 2022-08-01
Payer: MEDICARE

## 2022-08-01 VITALS
TEMPERATURE: 98 F | BODY MASS INDEX: 23.13 KG/M2 | DIASTOLIC BLOOD PRESSURE: 59 MMHG | SYSTOLIC BLOOD PRESSURE: 133 MMHG | HEART RATE: 66 BPM | RESPIRATION RATE: 17 BRPM | WEIGHT: 122.5 LBS | HEIGHT: 61 IN | OXYGEN SATURATION: 98 %

## 2022-08-01 DIAGNOSIS — T45.1X5A CHEMOTHERAPY-INDUCED NEUROPATHY (HCC): ICD-10-CM

## 2022-08-01 DIAGNOSIS — C25.1 MALIGNANT NEOPLASM OF BODY OF PANCREAS (HCC): Primary | ICD-10-CM

## 2022-08-01 DIAGNOSIS — G62.0 CHEMOTHERAPY-INDUCED NEUROPATHY (HCC): ICD-10-CM

## 2022-08-01 DIAGNOSIS — C25.1 MALIGNANT NEOPLASM OF BODY OF PANCREAS (HCC): ICD-10-CM

## 2022-08-01 LAB
ALBUMIN SERPL-MCNC: 3.2 G/DL (ref 3.2–4.6)
ALBUMIN/GLOB SERPL: 0.7 {RATIO} (ref 1.2–3.5)
ALP SERPL-CCNC: 77 U/L (ref 50–136)
ALT SERPL-CCNC: 19 U/L (ref 12–65)
ANION GAP SERPL CALC-SCNC: 3 MMOL/L (ref 7–16)
AST SERPL-CCNC: 17 U/L (ref 15–37)
BASOPHILS # BLD: 0.2 K/UL (ref 0–0.2)
BASOPHILS NFR BLD: 1 % (ref 0–2)
BILIRUB SERPL-MCNC: 0.9 MG/DL (ref 0.2–1.1)
BUN SERPL-MCNC: 18 MG/DL (ref 8–23)
CALCIUM SERPL-MCNC: 9.4 MG/DL (ref 8.3–10.4)
CANCER AG19-9 SERPL-ACNC: 22.9 U/ML (ref 2–37)
CHLORIDE SERPL-SCNC: 105 MMOL/L (ref 98–107)
CO2 SERPL-SCNC: 31 MMOL/L (ref 21–32)
CREAT SERPL-MCNC: 1.2 MG/DL (ref 0.6–1)
DIFFERENTIAL METHOD BLD: ABNORMAL
EOSINOPHIL # BLD: 0.5 K/UL (ref 0–0.8)
EOSINOPHIL NFR BLD: 3 % (ref 0.5–7.8)
ERYTHROCYTE [DISTWIDTH] IN BLOOD BY AUTOMATED COUNT: 14.8 % (ref 11.9–14.6)
GLOBULIN SER CALC-MCNC: 4.3 G/DL (ref 2.3–3.5)
GLUCOSE SERPL-MCNC: 139 MG/DL (ref 65–100)
HCT VFR BLD AUTO: 38.5 % (ref 35.8–46.3)
HGB BLD-MCNC: 12.3 G/DL (ref 11.7–15.4)
IMM GRANULOCYTES # BLD AUTO: 0 K/UL (ref 0–0.5)
IMM GRANULOCYTES NFR BLD AUTO: 0 % (ref 0–5)
LYMPHOCYTES # BLD: 7.1 K/UL (ref 0.5–4.6)
LYMPHOCYTES NFR BLD: 44 % (ref 13–44)
MCH RBC QN AUTO: 29.2 PG (ref 26.1–32.9)
MCHC RBC AUTO-ENTMCNC: 31.9 G/DL (ref 31.4–35)
MCV RBC AUTO: 91.4 FL (ref 79.6–97.8)
MONOCYTES # BLD: 1.5 K/UL (ref 0.1–1.3)
MONOCYTES NFR BLD: 9 % (ref 4–12)
NEUTS SEG # BLD: 7 K/UL (ref 1.7–8.2)
NEUTS SEG NFR BLD: 43 % (ref 43–78)
NRBC # BLD: 0 K/UL (ref 0–0.2)
PLATELET # BLD AUTO: 303 K/UL (ref 150–450)
PLATELET COMMENT: ADEQUATE
PMV BLD AUTO: 10.4 FL (ref 9.4–12.3)
POTASSIUM SERPL-SCNC: 4.3 MMOL/L (ref 3.5–5.1)
PROT SERPL-MCNC: 7.5 G/DL (ref 6.3–8.2)
RBC # BLD AUTO: 4.21 M/UL (ref 4.05–5.2)
RBC MORPH BLD: ABNORMAL
SODIUM SERPL-SCNC: 139 MMOL/L (ref 136–145)
WBC # BLD AUTO: 16.3 K/UL (ref 4.3–11.1)
WBC MORPH BLD: ABNORMAL

## 2022-08-01 PROCEDURE — 1036F TOBACCO NON-USER: CPT | Performed by: INTERNAL MEDICINE

## 2022-08-01 PROCEDURE — 36415 COLL VENOUS BLD VENIPUNCTURE: CPT

## 2022-08-01 PROCEDURE — G8427 DOCREV CUR MEDS BY ELIG CLIN: HCPCS | Performed by: INTERNAL MEDICINE

## 2022-08-01 PROCEDURE — 85025 COMPLETE CBC W/AUTO DIFF WBC: CPT

## 2022-08-01 PROCEDURE — 3017F COLORECTAL CA SCREEN DOC REV: CPT | Performed by: INTERNAL MEDICINE

## 2022-08-01 PROCEDURE — 86301 IMMUNOASSAY TUMOR CA 19-9: CPT

## 2022-08-01 PROCEDURE — G8400 PT W/DXA NO RESULTS DOC: HCPCS | Performed by: INTERNAL MEDICINE

## 2022-08-01 PROCEDURE — 80053 COMPREHEN METABOLIC PANEL: CPT

## 2022-08-01 PROCEDURE — 99214 OFFICE O/P EST MOD 30 MIN: CPT | Performed by: INTERNAL MEDICINE

## 2022-08-01 PROCEDURE — G8420 CALC BMI NORM PARAMETERS: HCPCS | Performed by: INTERNAL MEDICINE

## 2022-08-01 PROCEDURE — 1123F ACP DISCUSS/DSCN MKR DOCD: CPT | Performed by: INTERNAL MEDICINE

## 2022-08-01 PROCEDURE — 1090F PRES/ABSN URINE INCON ASSESS: CPT | Performed by: INTERNAL MEDICINE

## 2022-08-01 RX ORDER — GLIPIZIDE 5 MG/1
TABLET, FILM COATED, EXTENDED RELEASE ORAL
COMMUNITY
Start: 2022-07-21

## 2022-08-01 RX ORDER — TRAZODONE HYDROCHLORIDE 50 MG/1
TABLET ORAL
COMMUNITY
Start: 2022-07-18

## 2022-08-01 ASSESSMENT — PATIENT HEALTH QUESTIONNAIRE - PHQ9
SUM OF ALL RESPONSES TO PHQ QUESTIONS 1-9: 0
1. LITTLE INTEREST OR PLEASURE IN DOING THINGS: 0
2. FEELING DOWN, DEPRESSED OR HOPELESS: 0
SUM OF ALL RESPONSES TO PHQ9 QUESTIONS 1 & 2: 0
SUM OF ALL RESPONSES TO PHQ QUESTIONS 1-9: 0

## 2022-08-01 NOTE — PROGRESS NOTES
[facility] Hematology and Oncology: Office Visit Established Patient    Chief Complaint:    Chief Complaint   Patient presents with    Follow-up         History of Present Illness:  Ms. Abby Bell is a 77 y.o. female who returns today for management of resected pancreatic cancer. 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 This was followed until September 2020 at which time there was new dilatation of the main PD, suggesting main duct IPMN. She underwent EUS with FNA which showed findings consistent with a mixed main/side branch IPMN, cytology was negative. She was followed for another 6 months, but imaging showed ongoing dilation of the main pancreatic duct and she was recommended for  distal pancreatectomy. This was completed on 5/5/21, also included splenectomy, cholecystectomy and extensive vascular dissection. 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 Post-operatively, she developed a large left pleural effusion, this was drained and did not show malignant cells. She was referred to medical oncology for recommendations. We recommended adjuvant  therapy with FOLFIRINOX for 12 cycles. She completed 12 cycles of FOLFIRINOX in January with post-treatment assessment showing ZANDRA. Here for follow-up and restaging. She is doing very well currently. She is about 6 months out from completion of therapy. She has no residual painful neuropathy, some numbness still in the toes. No GI symptoms. No residual pulmonary symptoms. Energy level is better, ECOG 0. No other pain. She denies any fevers or chills. Review of Systems:  Constitutional: Negative. HENT: Negative. Eyes: Negative. Respiratory: Negative. Cardiovascular: Negative. Gastrointestinal: Negative. Genitourinary: Negative. Musculoskeletal: Negative. Skin: Negative. Neurological: Negative. Endo/Heme/Allergies: Negative. Psychiatric/Behavioral: Negative. All other systems reviewed and are negative. Allergies   Allergen Reactions    Lisinopril Palpitations     Past Medical History:   Diagnosis Date    Adverse effect of anesthesia     Oxygen dropped during EUS and Colonoscopy    Arrhythmia     AT/SVT; Followed by Dr. Diaz-Cardiologist, medication    COVID-19 vaccine series completed 03/06/2021    moderna vaccine, 2nd dose 3/6/21  documentation needed    Depressed     managed with med    GERD (gastroesophageal reflux disease)     managed with med    Hypercholesterolemia     managed with med    Hypertension     managed with med    Hypokalemia 10/26/2021    Hypomagnesemia 10/26/2021    IPMN (intraductal papillary mucinous neoplasm)     Pancreatic cyst 2021    surgery     Pancreatic cyst     Type 2 diabetes mellitus ([facility] [address]     oral agents, FBS 200s, denies hypoglycemia     Past Surgical History:   Procedure Laterality Date    BREAST BIOPSY Right     per pt was fatty tissue    CHOLECYSTECTOMY  05/2021    removed part of pancreas and spleen    COLONOSCOPY  2016    GYN      uterine fibroid    AK ABDOMEN SURGERY PROC UNLISTED  2021    removed part of pancreas and spleen    TONSILLECTOMY      UPPER GASTROINTESTINAL ENDOSCOPY  2021     No family history on file. Social History     Socioeconomic History    Marital status:      Spouse name: Not on file    Number of children: Not on file    Years of education: Not on file    Highest education level: Not on file   Occupational History    Not on file   Tobacco Use    Smoking status: Never    Smokeless tobacco: Never   Substance and Sexual Activity    Alcohol use:  Yes     Alcohol/week: 1.0 standard drink    Drug use: Never    Sexual activity: Not on file   Other Topics Concern    Not on file   Social History Narrative    Not on file     Social Determinants of Health     Financial Resource Strain: Not on file   Food Insecurity: Not on file   Transportation Needs: Not on file   Physical Activity: Not on file   Stress: Not on file   Social Connections: Not on file   Intimate Partner Violence: Not on file   Housing Stability: Not on file     Current Outpatient Medications   Medication Sig Dispense Refill    traZODone (DESYREL) 50 MG tablet TAKE 1 TABLET BY MOUTH AT BEDTIME AS NEEDED FOR SLEEP. glipiZIDE (GLUCOTROL XL) 5 MG extended release tablet TAKE 1 TABLET BY MOUTH EVERY DAY      amLODIPine (NORVASC) 5 MG tablet Take 5 mg by mouth daily      aspirin 81 MG EC tablet Take by mouth daily      atenolol (TENORMIN) 25 MG tablet Take 25 mg by mouth daily      flecainide (TAMBOCOR) 150 MG tablet Take 150 mg by mouth daily      metFORMIN (GLUCOPHAGE) 500 MG tablet 2 times daily (with meals)      pravastatin (PRAVACHOL) 10 MG tablet Take by mouth daily      sertraline (ZOLOFT) 50 MG tablet Take 50 mg by mouth daily      triamcinolone (ARISTOCORT) 0.5 % cream Apply topically 2 times daily as needed      mirtazapine (REMERON) 7.5 MG tablet Take 7.5 mg by mouth      pantoprazole (PROTONIX) 40 MG tablet Take 40 mg by mouth daily       No current facility-administered medications for this visit. OBJECTIVE:  BP (!) 133/59 (Site: Right Upper Arm, Position: Standing, Cuff Size: Medium Adult)   Pulse 66   Temp 98 °F (36.7 °C) (Oral)   Resp 17   Ht 5' 1\" (1.549 m)   Wt 122 lb 8 oz (55.6 kg)   SpO2 98%   BMI 23.15 kg/m²     Physical Exam:  Constitutional: Well developed, well nourished female in no acute distress, sitting comfortably in the exam room chair. HEENT: Normocephalic and atraumatic. Sclerae anicteric. Neck supple without JVD. No thyromegaly present. Lymph node   No palpable submandibular, cervical, supraclavicular lymph nodes. Skin Warm and dry. No bruising and no rash noted. No erythema. No pallor.     Respiratory Lungs are clear to auscultation bilaterally without wheezes, rales or rhonchi, normal air exchange without accessory muscle use. CVS Normal rate, regular rhythm and normal S1 and S2. No murmurs, gallops, or rubs. Abdomen Soft, nontender and nondistended, normoactive bowel sounds. No palpable mass. No hepatosplenomegaly. Neuro Grossly nonfocal with no obvious sensory or motor deficits. MSK Normal range of motion in general.  No edema and no tenderness. Psych Appropriate mood and affect.       Labs:  Recent Results (from the past 96 hour(s))   Cancer Antigen 19-9    Collection Time: 08/01/22  2:02 PM   Result Value Ref Range    CA 19-9 22.90 2.0 - 37.0 U/mL   CBC with Auto Differential    Collection Time: 08/01/22  2:02 PM   Result Value Ref Range    WBC 16.3 (H) 4.3 - 11.1 K/uL    RBC 4.21 4.05 - 5.2 M/uL    Hemoglobin 12.3 11.7 - 15.4 g/dL    Hematocrit 38.5 35.8 - 46.3 %    MCV 91.4 79.6 - 97.8 FL    MCH 29.2 26.1 - 32.9 PG    MCHC 31.9 31.4 - 35.0 g/dL    RDW 14.8 (H) 11.9 - 14.6 %    Platelets 094 220 - 940 K/uL    MPV 10.4 9.4 - 12.3 FL    nRBC 0.00 0.0 - 0.2 K/uL    Seg Neutrophils 43 43 - 78 %    Lymphocytes 44 13 - 44 %    Monocytes 9 4.0 - 12.0 %    Eosinophils % 3 0.5 - 7.8 %    Basophils 1 0.0 - 2.0 %    Immature Granulocytes 0 0.0 - 5.0 %    Segs Absolute 7.0 1.7 - 8.2 K/UL    Absolute Lymph # 7.1 (H) 0.5 - 4.6 K/UL    Absolute Mono # 1.5 (H) 0.1 - 1.3 K/UL    Absolute Eos # 0.5 0.0 - 0.8 K/UL    Basophils Absolute 0.2 0.0 - 0.2 K/UL    Absolute Immature Granulocyte 0.0 0.0 - 0.5 K/UL    RBC Comment SLIGHT  ANISOCYTOSIS + POIKILOCYTOSIS        WBC Comment Result Confirmed By Smear      Platelet Comment ADEQUATE      Differential Type AUTOMATED     Comprehensive Metabolic Panel    Collection Time: 08/01/22  2:02 PM   Result Value Ref Range    Sodium 139 136 - 145 mmol/L    Potassium 4.3 3.5 - 5.1 mmol/L    Chloride 105 98 - 107 mmol/L    CO2 31 21 - 32 mmol/L    Anion Gap 3 (L) 7 - 16 mmol/L    Glucose 139 (H) 65 - 100 mg/dL    BUN 18 8 - 23 MG/DL    Creatinine 1.20 (H) 0.6 - 1.0 MG/DL    GFR  56 (L) >60 ml/min/1.73m2    GFR Non- 47 (L) >60 ml/min/1.73m2    Calcium 9.4 8.3 - 10.4 MG/DL    Total Bilirubin 0.9 0.2 - 1.1 MG/DL    ALT 19 12 - 65 U/L    AST 17 15 - 37 U/L    Alk Phosphatase 77 50 - 136 U/L    Total Protein 7.5 6.3 - 8.2 g/dL    Albumin 3.2 3.2 - 4.6 g/dL    Globulin 4.3 (H) 2.3 - 3.5 g/dL    Albumin/Globulin Ratio 0.7 (L) 1.2 - 3.5         Imaging:  CT CHEST ABDOMEN PELVIS W CONTRAST    Result Date: 7/28/2022  1. Postsurgical changes status post Whipple procedure with continued decrease size of a now 3.9 cm fluid collection within the resection bed. No evidence of local recurrence or distant metastasis to the chest, abdomen, or pelvis. ASSESSMENT:   Diagnosis Orders   1. Malignant neoplasm of body of pancreas ([address]  CT CHEST ABDOMEN PELVIS W CONTRAST    CBC with Auto Differential    Comprehensive Metabolic Panel    Cancer Antigen 19-9      2. Chemotherapy-induced neuropathy ([address]              PLAN:  Lab studies were personally reviewed. Pancreatic carcinoma: ductal adenocarcinoma arising from IPMN, 3.6 cm, R0 resection with 21 total nodes negative. Post-op left pleural effusion, drained with no malignant cells, likely reactive. Although she had a successful resection without dawit  involvement, her risk of recurrence with just surgical excision is unacceptably high. I recommend adjuvant chemotherapy for risk reduction. Either mFOLFIRINOX or gemcitabine/capecitabine are acceptable, our preference has been mFOLFIRINOX for tolerance  and demonstrated efficacy vs gem alone in the PRODIGE trial.  She completed 6 months of adjuvant therapy with follow-up imaging showing no evidence of recurrent or progressive disease. Here for follow-up and restaging. She is doing very well currently. She is about 6 months out from completion of therapy.   She has no residual painful neuropathy, some numbness still in the toes. No GI symptoms. No residual pulmonary symptoms. Energy level is better, ECOG 0. No other pain. She denies any fevers or chills. Labs reviewed and adequate, WBC remains mildly increased likely post-splenectomy leukocytosis rather than an LPD. Ca 19-9 22 which is normalized. CT reviewed and shows no evidence of recurrent disease, continued complete  response. Post-operative fluid collection is decreasing. Continue observation with no plans for additional therapy barring any clinical or radiographic changes. All questions were asked and answered to the best of my ability.               Betty Sanchez MD, MD  [facility] Hematology and Oncology  [address], [address] [place] [address]  Office : [phone]  Fax : [phone]

## 2022-08-01 NOTE — PATIENT INSTRUCTIONS
Patient Instructions from Today's Visit    Reason for Visit:  Follow up- -Pancreatic    Diagnosis Information:  https://www.Goodreads/. net/about-us/asco-answers-patient-education-materials/qibf-ckundua-jrge-sheets      Plan:  - labs and scan reviewed    Follow Up:  - 3 months    Recent Lab Results:  Hospital Outpatient Visit on 08/01/2022   Component Date Value Ref Range Status    CA 19-9 08/01/2022 22.90  2.0 - 37.0 U/mL Final    [employer]. Patient's results of tumor marker testing may not be comparable to labs using different manufacturers/methods.     WBC 08/01/2022 16.3 (A) 4.3 - 11.1 K/uL Final    Comment: RESULTS CHECKED X 2  PERIPHERAL REVIEW TO FOLLOW      RBC 08/01/2022 4.21  4.05 - 5.2 M/uL Final    Hemoglobin 08/01/2022 12.3  11.7 - 15.4 g/dL Final    Hematocrit 08/01/2022 38.5  35.8 - 46.3 % Final    MCV 08/01/2022 91.4  79.6 - 97.8 FL Final    MCH 08/01/2022 29.2  26.1 - 32.9 PG Final    MCHC 08/01/2022 31.9  31.4 - 35.0 g/dL Final    RDW 08/01/2022 14.8 (A) 11.9 - 14.6 % Final    Platelets 35/50/2363 303  150 - 450 K/uL Final    MPV 08/01/2022 10.4  9.4 - 12.3 FL Final    nRBC 08/01/2022 0.00  0.0 - 0.2 K/uL Final    **Note: Absolute NRBC parameter is now reported with Hemogram**    Differential Type 08/01/2022 PENDING   Incomplete    Sodium 08/01/2022 139  136 - 145 mmol/L Final    Potassium 08/01/2022 4.3  3.5 - 5.1 mmol/L Final    Chloride 08/01/2022 105  98 - 107 mmol/L Final    CO2 08/01/2022 31  21 - 32 mmol/L Final    Anion Gap 08/01/2022 3 (A) 7 - 16 mmol/L Final    Glucose 08/01/2022 139 (A) 65 - 100 mg/dL Final    BUN 08/01/2022 18  8 - 23 MG/DL Final    Creatinine 08/01/2022 1.20 (A) 0.6 - 1.0 MG/DL Final    GFR  08/01/2022 56 (A) >60 ml/min/1.73m2 Final    GFR Non- 08/01/2022 47 (A) >60 ml/min/1.73m2 Final    Comment:      Estimated GFR is calculated using the Modification of Diet in Renal Disease (MDRD) Study equation, reported for both  Americans (GFRAA) and non- Americans (GFRNA), and normalized to 1.73m2 body surface area. The physician must decide which value applies to the patient. The MDRD study equation should only be used in individuals age 25 or older. It has not been validated for the following: pregnant women, patients with serious comorbid conditions,or on certain medications, or persons with extremes of body size, muscle mass, or nutritional status. Calcium 08/01/2022 9.4  8.3 - 10.4 MG/DL Final    Total Bilirubin 08/01/2022 0.9  0.2 - 1.1 MG/DL Final    ALT 08/01/2022 19  12 - 65 U/L Final    AST 08/01/2022 17  15 - 37 U/L Final    Alk Phosphatase 08/01/2022 77  50 - 136 U/L Final    Total Protein 08/01/2022 7.5  6.3 - 8.2 g/dL Final    Albumin 08/01/2022 3.2  3.2 - 4.6 g/dL Final    Globulin 08/01/2022 4.3 (A) 2.3 - 3.5 g/dL Final    Albumin/Globulin Ratio 08/01/2022 0.7 (A) 1.2 - 3.5   Final   Hospital Outpatient Visit on 07/28/2022   Component Date Value Ref Range Status    POC Creatinine 07/28/2022 1.53 (A) 0.8 - 1.5 mg/dL Final    POC GFR  07/28/2022 43 (A) >60 ml/min/1.73m2 Final    Glomerular Filtration Rate, POC 07/28/2022 35 (A) >60 ml/min/1.73m2 Final    Comment: (NOTE)  Estimated GFR is calculated using the Modification of Diet in Renal   Disease (MDRD) Study Equation, reported for both  Americans   (GFRAA) and non- Americans (GFRNA), and normalized to 1.73m2   body surface area. The physician must decide which value applies to   the patient. The MDRD study equation should only be used in   individuals age 25 or older. It has not been validated for the   following: pregnant women, patients with serious comorbid conditions,   or on certain medications, or persons with extremes of body size,   muscle mass, or nutritional status.            Treatment Summary has been discussed and given to patient: n/a        -------------------------------------------------------------------------------------------------------------------  Please call our office at [phone] if you have any  of the following symptoms:   Fever of 100.5 or greater  Chills  Shortness of breath  Swelling or pain in one leg    After office hours an answering service is available and will contact a provider for emergencies or if you are experiencing any of the above symptoms. Patient has My Chart. My Chart log in information explained on the after visit summary printout at the [address] desk.     Aman Harrison RN

## 2022-10-25 ENCOUNTER — HOSPITAL ENCOUNTER (OUTPATIENT)
Dept: CT IMAGING | Age: 77
Discharge: HOME | End: 2022-10-28
Payer: MEDICARE

## 2022-10-25 DIAGNOSIS — C25.1 MALIGNANT NEOPLASM OF BODY OF PANCREAS (HCC): ICD-10-CM

## 2022-10-25 LAB — CREAT BLD-MCNC: 1.09 MG/DL (ref 0.8–1.5)

## 2022-10-25 PROCEDURE — 6360000004 HC RX CONTRAST MEDICATION: Performed by: INTERNAL MEDICINE

## 2022-10-25 PROCEDURE — 74177 CT ABD & PELVIS W/CONTRAST: CPT

## 2022-10-25 PROCEDURE — 82565 ASSAY OF CREATININE: CPT

## 2022-10-25 PROCEDURE — 2580000003 HC RX 258: Performed by: INTERNAL MEDICINE

## 2022-10-25 RX ORDER — 0.9 % SODIUM CHLORIDE 0.9 %
100 INTRAVENOUS SOLUTION INTRAVENOUS
Status: COMPLETED | OUTPATIENT
Start: 2022-10-25 | End: 2022-10-25

## 2022-10-25 RX ORDER — SODIUM CHLORIDE 0.9 % (FLUSH) 0.9 %
10 SYRINGE (ML) INJECTION
Status: COMPLETED | OUTPATIENT
Start: 2022-10-25 | End: 2022-10-25

## 2022-10-25 RX ADMIN — DIATRIZOATE MEGLUMINE AND DIATRIZOATE SODIUM 15 ML: 660; 100 LIQUID ORAL; RECTAL at 10:59

## 2022-10-25 RX ADMIN — IOPAMIDOL 100 ML: 755 INJECTION, SOLUTION INTRAVENOUS at 10:58

## 2022-10-25 RX ADMIN — SODIUM CHLORIDE 100 ML: 9 INJECTION, SOLUTION INTRAVENOUS at 10:58

## 2022-10-25 RX ADMIN — SODIUM CHLORIDE, PRESERVATIVE FREE 10 ML: 5 INJECTION INTRAVENOUS at 10:58

## 2022-11-01 ENCOUNTER — OFFICE VISIT (OUTPATIENT)
Dept: ONCOLOGY | Age: 77
End: 2022-11-01
Payer: MEDICARE

## 2022-11-01 ENCOUNTER — HOSPITAL ENCOUNTER (OUTPATIENT)
Dept: LAB | Age: 77
Discharge: HOME | End: 2022-11-04
Payer: MEDICARE

## 2022-11-01 VITALS
DIASTOLIC BLOOD PRESSURE: 68 MMHG | RESPIRATION RATE: 16 BRPM | TEMPERATURE: 98.6 F | WEIGHT: 133 LBS | HEIGHT: 61 IN | BODY MASS INDEX: 25.11 KG/M2 | HEART RATE: 60 BPM | OXYGEN SATURATION: 95 % | SYSTOLIC BLOOD PRESSURE: 136 MMHG

## 2022-11-01 DIAGNOSIS — C25.1 MALIGNANT NEOPLASM OF BODY OF PANCREAS (HCC): Primary | ICD-10-CM

## 2022-11-01 DIAGNOSIS — C25.1 MALIGNANT NEOPLASM OF BODY OF PANCREAS (HCC): ICD-10-CM

## 2022-11-01 DIAGNOSIS — T45.1X5A CHEMOTHERAPY-INDUCED NEUROPATHY (HCC): ICD-10-CM

## 2022-11-01 DIAGNOSIS — G62.0 CHEMOTHERAPY-INDUCED NEUROPATHY (HCC): ICD-10-CM

## 2022-11-01 LAB
ALBUMIN SERPL-MCNC: 3.3 G/DL (ref 3.2–4.6)
ALBUMIN/GLOB SERPL: 0.8 {RATIO} (ref 0.4–1.6)
ALP SERPL-CCNC: 75 U/L (ref 50–136)
ALT SERPL-CCNC: 21 U/L (ref 12–65)
ANION GAP SERPL CALC-SCNC: 3 MMOL/L (ref 2–11)
AST SERPL-CCNC: 18 U/L (ref 15–37)
BASOPHILS # BLD: 0.1 K/UL (ref 0–0.2)
BASOPHILS NFR BLD: 1 % (ref 0–2)
BILIRUB SERPL-MCNC: 0.4 MG/DL (ref 0.2–1.1)
BUN SERPL-MCNC: 16 MG/DL (ref 8–23)
CALCIUM SERPL-MCNC: 9.2 MG/DL (ref 8.3–10.4)
CANCER AG19-9 SERPL-ACNC: 26.2 U/ML (ref 2–37)
CHLORIDE SERPL-SCNC: 107 MMOL/L (ref 101–110)
CO2 SERPL-SCNC: 28 MMOL/L (ref 21–32)
CREAT SERPL-MCNC: 1.3 MG/DL (ref 0.6–1)
DIFFERENTIAL METHOD BLD: ABNORMAL
EOSINOPHIL # BLD: 0.4 K/UL (ref 0–0.8)
EOSINOPHIL NFR BLD: 3 % (ref 0.5–7.8)
ERYTHROCYTE [DISTWIDTH] IN BLOOD BY AUTOMATED COUNT: 14.2 % (ref 11.9–14.6)
GLOBULIN SER CALC-MCNC: 4 G/DL (ref 2.8–4.5)
GLUCOSE SERPL-MCNC: 297 MG/DL (ref 65–100)
HCT VFR BLD AUTO: 40.4 % (ref 35.8–46.3)
HGB BLD-MCNC: 13 G/DL (ref 11.7–15.4)
IMM GRANULOCYTES # BLD AUTO: 0 K/UL (ref 0–0.5)
IMM GRANULOCYTES NFR BLD AUTO: 0 % (ref 0–5)
LYMPHOCYTES # BLD: 6.6 K/UL (ref 0.5–4.6)
LYMPHOCYTES NFR BLD: 46 % (ref 13–44)
MCH RBC QN AUTO: 30 PG (ref 26.1–32.9)
MCHC RBC AUTO-ENTMCNC: 32.2 G/DL (ref 31.4–35)
MCV RBC AUTO: 93.1 FL (ref 82–102)
MONOCYTES # BLD: 1.3 K/UL (ref 0.1–1.3)
MONOCYTES NFR BLD: 9 % (ref 4–12)
NEUTS SEG # BLD: 5.9 K/UL (ref 1.7–8.2)
NEUTS SEG NFR BLD: 41 % (ref 43–78)
NRBC # BLD: 0 K/UL (ref 0–0.2)
PLATELET # BLD AUTO: 302 K/UL (ref 150–450)
PLATELET COMMENT: ADEQUATE
PMV BLD AUTO: 11 FL (ref 9.4–12.3)
POTASSIUM SERPL-SCNC: 4.5 MMOL/L (ref 3.5–5.1)
PROT SERPL-MCNC: 7.3 G/DL (ref 6.3–8.2)
RBC # BLD AUTO: 4.34 M/UL (ref 4.05–5.2)
RBC MORPH BLD: ABNORMAL
SODIUM SERPL-SCNC: 138 MMOL/L (ref 133–143)
WBC # BLD AUTO: 14.3 K/UL (ref 4.3–11.1)
WBC MORPH BLD: ABNORMAL

## 2022-11-01 PROCEDURE — 85025 COMPLETE CBC W/AUTO DIFF WBC: CPT

## 2022-11-01 PROCEDURE — 80053 COMPREHEN METABOLIC PANEL: CPT

## 2022-11-01 PROCEDURE — 36415 COLL VENOUS BLD VENIPUNCTURE: CPT

## 2022-11-01 PROCEDURE — G8484 FLU IMMUNIZE NO ADMIN: HCPCS | Performed by: INTERNAL MEDICINE

## 2022-11-01 PROCEDURE — 1036F TOBACCO NON-USER: CPT | Performed by: INTERNAL MEDICINE

## 2022-11-01 PROCEDURE — 1090F PRES/ABSN URINE INCON ASSESS: CPT | Performed by: INTERNAL MEDICINE

## 2022-11-01 PROCEDURE — G8417 CALC BMI ABV UP PARAM F/U: HCPCS | Performed by: INTERNAL MEDICINE

## 2022-11-01 PROCEDURE — 3078F DIAST BP <80 MM HG: CPT | Performed by: INTERNAL MEDICINE

## 2022-11-01 PROCEDURE — 3017F COLORECTAL CA SCREEN DOC REV: CPT | Performed by: INTERNAL MEDICINE

## 2022-11-01 PROCEDURE — G8400 PT W/DXA NO RESULTS DOC: HCPCS | Performed by: INTERNAL MEDICINE

## 2022-11-01 PROCEDURE — G8428 CUR MEDS NOT DOCUMENT: HCPCS | Performed by: INTERNAL MEDICINE

## 2022-11-01 PROCEDURE — 86301 IMMUNOASSAY TUMOR CA 19-9: CPT

## 2022-11-01 PROCEDURE — 3074F SYST BP LT 130 MM HG: CPT | Performed by: INTERNAL MEDICINE

## 2022-11-01 PROCEDURE — 99214 OFFICE O/P EST MOD 30 MIN: CPT | Performed by: INTERNAL MEDICINE

## 2022-11-01 PROCEDURE — 1123F ACP DISCUSS/DSCN MKR DOCD: CPT | Performed by: INTERNAL MEDICINE

## 2022-11-01 ASSESSMENT — PATIENT HEALTH QUESTIONNAIRE - PHQ9
SUM OF ALL RESPONSES TO PHQ9 QUESTIONS 1 & 2: 0
2. FEELING DOWN, DEPRESSED OR HOPELESS: 0
SUM OF ALL RESPONSES TO PHQ QUESTIONS 1-9: 0
1. LITTLE INTEREST OR PLEASURE IN DOING THINGS: 0
SUM OF ALL RESPONSES TO PHQ QUESTIONS 1-9: 0

## 2022-11-01 NOTE — PATIENT INSTRUCTIONS
Patient Instructions from Today's Visit    Reason for Visit:  Follow up visit for pancreatic cancer      Plan:    CT reviewed. CT scan ordered to be done again before your office visit in 3 months. Radiology will call you to schedule this. If you would rather call them, their number is [phone]. Follow Up:  - 3 months with CT prior    Recent Lab Results:       Treatment Summary has been discussed and given to patient: n/a        -------------------------------------------------------------------------------------------------------------------  Please call our office at [phone] if you have any  of the following symptoms:   Fever of 100.5 or greater  Chills  Shortness of breath  Swelling or pain in one leg    After office hours an answering service is available and will contact a provider for emergencies or if you are experiencing any of the above symptoms. Patient did express an interest in My Chart. My Chart log in information explained on the after visit summary printout at the [facility] [address] desk.     Steve Calvillo RN

## 2022-11-01 NOTE — PROGRESS NOTES
[facility] Hematology and Oncology: Office Visit Established Patient    Chief Complaint:    Chief Complaint   Patient presents with    Follow-up         History of Present Illness:  Ms. Ellis Camacho is a 77 y.o. female who returns today for management of resected pancreatic cancer. 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 This was followed until September 2020 at which time there was new dilatation of the main PD, suggesting main duct IPMN. She underwent EUS with FNA which showed findings consistent with a mixed main/side branch IPMN, cytology was negative. She was followed for another 6 months, but imaging showed ongoing dilation of the main pancreatic duct and she was recommended for  distal pancreatectomy. This was completed on 5/5/21, also included splenectomy, cholecystectomy and extensive vascular dissection. 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 Post-operatively, she developed a large left pleural effusion, this was drained and did not show malignant cells. She was referred to medical oncology for recommendations. We recommended adjuvant  therapy with FOLFIRINOX for 12 cycles. She completed 12 cycles of FOLFIRINOX in January with post-treatment assessment showing ZANDRA. Here for follow-up and restaging. She is doing very well currently. She is about 9 months out from completion of therapy. She still has some residual neuropathy in the fingers, better in the toes. She has some pain in the fingers but this seems arthritic in nature. No GI symptoms. Energy level is better, ECOG 0. No other pain. She denies any fevers or infectious symptoms. Review of Systems:  Constitutional: Negative. HENT: Negative. Eyes: Negative. Respiratory: Negative.    Cardiovascular: Negative. Gastrointestinal: Negative. Genitourinary: Negative. Musculoskeletal: Negative. Skin: Negative. Neurological: Positive for neuropathy (sensory). Endo/Heme/Allergies: Negative. Psychiatric/Behavioral: Negative. All other systems reviewed and are negative. Allergies   Allergen Reactions    Lisinopril Palpitations     Past Medical History:   Diagnosis Date    Adverse effect of anesthesia     Oxygen dropped during EUS and Colonoscopy    Arrhythmia     AT/SVT; Followed by Dr. Diaz-Cardiologist, medication    COVID-19 vaccine series completed 03/06/2021    moderna vaccine, 2nd dose 3/6/21  documentation needed    Depressed     managed with med    GERD (gastroesophageal reflux disease)     managed with med    Hypercholesterolemia     managed with med    Hypertension     managed with med    Hypokalemia 10/26/2021    Hypomagnesemia 10/26/2021    IPMN (intraductal papillary mucinous neoplasm)     Pancreatic cyst 2021    surgery     Pancreatic cyst     Type 2 diabetes mellitus ([facility] [address]     oral agents, FBS 200s, denies hypoglycemia     Past Surgical History:   Procedure Laterality Date    BREAST BIOPSY Right     per pt was fatty tissue    CHOLECYSTECTOMY  05/2021    removed part of pancreas and spleen    COLONOSCOPY  2016    GYN      uterine fibroid    CO ABDOMEN SURGERY PROC UNLISTED  2021    removed part of pancreas and spleen    TONSILLECTOMY      UPPER GASTROINTESTINAL ENDOSCOPY  2021     No family history on file. Social History     Socioeconomic History    Marital status:      Spouse name: Not on file    Number of children: Not on file    Years of education: Not on file    Highest education level: Not on file   Occupational History    Not on file   Tobacco Use    Smoking status: Never    Smokeless tobacco: Never   Substance and Sexual Activity    Alcohol use:  Yes     Alcohol/week: 1.0 standard drink    Drug use: Never    Sexual activity: Not on file   Other Topics Concern    Not on file   Social History Narrative    Not on file     Social Determinants of Health     Financial Resource Strain: Not on file   Food Insecurity: Not on file   Transportation Needs: Not on file   Physical Activity: Not on file   Stress: Not on file   Social Connections: Not on file   Intimate Partner Violence: Not on file   Housing Stability: Not on file     Current Outpatient Medications   Medication Sig Dispense Refill    glipiZIDE (GLUCOTROL XL) 5 MG extended release tablet TAKE 1 TABLET BY MOUTH EVERY DAY      amLODIPine (NORVASC) 5 MG tablet Take 5 mg by mouth daily      atenolol (TENORMIN) 25 MG tablet Take 25 mg by mouth daily      flecainide (TAMBOCOR) 150 MG tablet Take 150 mg by mouth daily      metFORMIN (GLUCOPHAGE) 500 MG tablet 2 times daily (with meals)      pravastatin (PRAVACHOL) 10 MG tablet Take by mouth daily      sertraline (ZOLOFT) 50 MG tablet Take 50 mg by mouth daily      triamcinolone (ARISTOCORT) 0.5 % cream Apply topically 2 times daily as needed      traZODone (DESYREL) 50 MG tablet TAKE 1 TABLET BY MOUTH AT BEDTIME AS NEEDED FOR SLEEP. (Patient not taking: Reported on 11/1/2022)      aspirin 81 MG EC tablet Take by mouth daily (Patient not taking: Reported on 11/1/2022)       No current facility-administered medications for this visit. OBJECTIVE:  /68 (Site: Left Upper Arm, Position: Sitting, Cuff Size: Medium Adult)   Pulse 60   Temp 98.6 °F (37 °C) (Oral)   Resp 16   Ht 5' 1\" (1.549 m)   Wt 133 lb (60.3 kg)   SpO2 95%   BMI 25.13 kg/m²     Physical Exam:  Constitutional: Well developed, well nourished female in no acute distress, sitting comfortably in the exam room chair. HEENT: Normocephalic and atraumatic. Sclerae anicteric. Neck supple without JVD. No thyromegaly present. Lymph node   No palpable submandibular, cervical, supraclavicular lymph nodes. Skin Warm and dry. No bruising and no rash noted. No erythema. No pallor.     Respiratory Lungs are clear to auscultation bilaterally without wheezes, rales or rhonchi, normal air exchange without accessory muscle use. CVS Normal rate, regular rhythm and normal S1 and S2. No murmurs, gallops, or rubs. Abdomen Soft, nontender and nondistended, normoactive bowel sounds. No palpable mass. No hepatosplenomegaly. Neuro Grossly nonfocal with no obvious sensory or motor deficits. MSK Normal range of motion in general.  No edema and no tenderness. Psych Appropriate mood and affect. Labs:  Recent Results (from the past 96 hour(s))   CBC with Auto Differential    Collection Time: 11/01/22  1:32 PM   Result Value Ref Range    WBC 14.3 (H) 4.3 - 11.1 K/uL    RBC 4.34 4.05 - 5.2 M/uL    Hemoglobin 13.0 11.7 - 15.4 g/dL    Hematocrit 40.4 35.8 - 46.3 %    MCV 93.1 82.0 - 102.0 FL    MCH 30.0 26.1 - 32.9 PG    MCHC 32.2 31.4 - 35.0 g/dL    RDW 14.2 11.9 - 14.6 %    Platelets 403 790 - 091 K/uL    MPV 11.0 9.4 - 12.3 FL    nRBC 0.00 0.0 - 0.2 K/uL    Differential Type PENDING    Comprehensive Metabolic Panel    Collection Time: 11/01/22  1:32 PM   Result Value Ref Range    Sodium 138 133 - 143 mmol/L    Potassium 4.5 3.5 - 5.1 mmol/L    Chloride 107 101 - 110 mmol/L    CO2 28 21 - 32 mmol/L    Anion Gap 3 2 - 11 mmol/L    Glucose 297 (H) 65 - 100 mg/dL    BUN 16 8 - 23 MG/DL    Creatinine 1.30 (H) 0.6 - 1.0 MG/DL    Est, Glom Filt Rate 43 (L) >60 ml/min/1.73m2    Calcium 9.2 8.3 - 10.4 MG/DL    Total Bilirubin 0.4 0.2 - 1.1 MG/DL    ALT 21 12 - 65 U/L    AST 18 15 - 37 U/L    Alk Phosphatase 75 50 - 136 U/L    Total Protein 7.3 6.3 - 8.2 g/dL    Albumin 3.3 3.2 - 4.6 g/dL    Globulin 4.0 2.8 - 4.5 g/dL    Albumin/Globulin Ratio 0.8 0.4 - 1.6     Cancer Antigen 19-9    Collection Time: 11/01/22  1:32 PM   Result Value Ref Range    CA 19-9 26.20 2.0 - 37.0 U/mL         Imaging:  CT CHEST ABDOMEN PELVIS W CONTRAST Additional Contrast? Radiologist Recommendation    Result Date: 10/25/2022  1.   No evidence of disease progression. 2.  Stable postsurgical changes of a distal pancreatectomy and splenectomy. The postsurgical collection appears relatively unchanged. CT CHEST ABDOMEN PELVIS W CONTRAST    Result Date: 7/28/2022  1. Postsurgical changes status post Whipple procedure with continued decrease size of a now 3.9 cm fluid collection within the resection bed. No evidence of local recurrence or distant metastasis to the chest, abdomen, or pelvis. ASSESSMENT:   Diagnosis Orders   1. Malignant neoplasm of body of pancreas ([address]  CT CHEST ABDOMEN PELVIS W CONTRAST Additional Contrast? Radiologist Recommendation    CBC with Auto Differential    Cancer Antigen 19-9    Comprehensive Metabolic Panel      2. Chemotherapy-induced neuropathy ([address]                PLAN:  Lab studies were personally reviewed. Pancreatic carcinoma: ductal adenocarcinoma arising from IPMN, 3.6 cm, R0 resection with 21 total nodes negative. Post-op left pleural effusion, drained with no malignant cells, likely reactive. Although she had a successful resection without dawit  involvement, her risk of recurrence with just surgical excision is unacceptably high. I recommend adjuvant chemotherapy for risk reduction. Either mFOLFIRINOX or gemcitabine/capecitabine are acceptable, our preference has been mFOLFIRINOX for tolerance  and demonstrated efficacy vs gem alone in the PRODIGE trial.  She completed 6 months of adjuvant therapy with follow-up imaging showing no evidence of recurrent or progressive disease. Here for follow-up and restaging. She is doing very well currently. She is about 9 months out from completion of therapy. She still has some residual neuropathy in the fingers, better in the toes. She has some pain in the fingers but this seems arthritic in nature. No GI symptoms. Energy level is better, ECOG 0. No other pain. She denies any fevers or infectious symptoms.   Labs reviewed and adequate, WBC remains mildly increased likely post-splenectomy leukocytosis rather than an LPD. Ca 19-9 26 which is within normal range. CT reviewed and shows no evidence of recurrent disease, continued complete response. Post-operative fluid collection is stable to slightly decreased. Continue observation with no plans for additional therapy barring any clinical or radiographic changes. All questions were asked and answered to the best of my ability.               Mauri Zaragoza MD, MD  [facility] Hematology and Oncology  [address] [place] [address]  Office : [phone]  Fax : [phone]

## 2023-01-23 ENCOUNTER — HOSPITAL ENCOUNTER (OUTPATIENT)
Dept: CT IMAGING | Age: 78
Discharge: HOME | End: 2023-01-26
Payer: MEDICARE

## 2023-01-23 DIAGNOSIS — C25.1 MALIGNANT NEOPLASM OF BODY OF PANCREAS (HCC): ICD-10-CM

## 2023-01-23 LAB — CREAT BLD-MCNC: 1.22 MG/DL (ref 0.8–1.5)

## 2023-01-23 PROCEDURE — 82565 ASSAY OF CREATININE: CPT

## 2023-01-23 PROCEDURE — 6360000004 HC RX CONTRAST MEDICATION: Performed by: INTERNAL MEDICINE

## 2023-01-23 PROCEDURE — 74177 CT ABD & PELVIS W/CONTRAST: CPT

## 2023-01-23 RX ORDER — 0.9 % SODIUM CHLORIDE 0.9 %
100 INTRAVENOUS SOLUTION INTRAVENOUS
Status: DISCONTINUED | OUTPATIENT
Start: 2023-01-23 | End: 2023-01-27 | Stop reason: HOSPADM

## 2023-01-23 RX ORDER — SODIUM CHLORIDE 0.9 % (FLUSH) 0.9 %
10 SYRINGE (ML) INJECTION
Status: DISCONTINUED | OUTPATIENT
Start: 2023-01-23 | End: 2023-01-27 | Stop reason: HOSPADM

## 2023-01-23 RX ADMIN — IOPAMIDOL 100 ML: 755 INJECTION, SOLUTION INTRAVENOUS at 11:22

## 2023-01-23 RX ADMIN — DIATRIZOATE MEGLUMINE AND DIATRIZOATE SODIUM 15 ML: 660; 100 LIQUID ORAL; RECTAL at 11:22

## 2023-02-06 ENCOUNTER — HOSPITAL ENCOUNTER (OUTPATIENT)
Dept: LAB | Age: 78
Discharge: HOME | End: 2023-02-09
Payer: MEDICARE

## 2023-02-06 ENCOUNTER — OFFICE VISIT (OUTPATIENT)
Dept: ONCOLOGY | Age: 78
End: 2023-02-06
Payer: MEDICARE

## 2023-02-06 VITALS
DIASTOLIC BLOOD PRESSURE: 66 MMHG | HEART RATE: 65 BPM | RESPIRATION RATE: 16 BRPM | BODY MASS INDEX: 25.86 KG/M2 | TEMPERATURE: 98 F | SYSTOLIC BLOOD PRESSURE: 135 MMHG | HEIGHT: 61 IN | WEIGHT: 137 LBS | OXYGEN SATURATION: 97 %

## 2023-02-06 DIAGNOSIS — C25.1 MALIGNANT NEOPLASM OF BODY OF PANCREAS (HCC): Primary | ICD-10-CM

## 2023-02-06 DIAGNOSIS — C25.1 MALIGNANT NEOPLASM OF BODY OF PANCREAS (HCC): ICD-10-CM

## 2023-02-06 LAB
ALBUMIN SERPL-MCNC: 3.6 G/DL (ref 3.2–4.6)
ALBUMIN/GLOB SERPL: 0.8 (ref 0.4–1.6)
ALP SERPL-CCNC: 77 U/L (ref 50–136)
ALT SERPL-CCNC: 22 U/L (ref 12–65)
ANION GAP SERPL CALC-SCNC: 4 MMOL/L (ref 2–11)
AST SERPL-CCNC: 16 U/L (ref 15–37)
BASOPHILS # BLD: 0.2 K/UL (ref 0–0.2)
BASOPHILS NFR BLD: 1 % (ref 0–2)
BILIRUB SERPL-MCNC: 0.5 MG/DL (ref 0.2–1.1)
BUN SERPL-MCNC: 23 MG/DL (ref 8–23)
CALCIUM SERPL-MCNC: 9.8 MG/DL (ref 8.3–10.4)
CANCER AG19-9 SERPL-ACNC: 27.8 U/ML (ref 2–37)
CHLORIDE SERPL-SCNC: 107 MMOL/L (ref 101–110)
CO2 SERPL-SCNC: 28 MMOL/L (ref 21–32)
CREAT SERPL-MCNC: 1.2 MG/DL (ref 0.6–1)
DIFFERENTIAL METHOD BLD: ABNORMAL
EOSINOPHIL # BLD: 0.5 K/UL (ref 0–0.8)
EOSINOPHIL NFR BLD: 3 % (ref 0.5–7.8)
ERYTHROCYTE [DISTWIDTH] IN BLOOD BY AUTOMATED COUNT: 14.1 % (ref 11.9–14.6)
GLOBULIN SER CALC-MCNC: 4.3 G/DL (ref 2.8–4.5)
GLUCOSE SERPL-MCNC: 154 MG/DL (ref 65–100)
HCT VFR BLD AUTO: 41.2 % (ref 35.8–46.3)
HGB BLD-MCNC: 13.5 G/DL (ref 11.7–15.4)
IMM GRANULOCYTES # BLD AUTO: 0 K/UL (ref 0–0.5)
IMM GRANULOCYTES NFR BLD AUTO: 0 % (ref 0–5)
LYMPHOCYTES # BLD: 7.3 K/UL (ref 0.5–4.6)
LYMPHOCYTES NFR BLD: 49 % (ref 13–44)
MCH RBC QN AUTO: 29.9 PG (ref 26.1–32.9)
MCHC RBC AUTO-ENTMCNC: 32.8 G/DL (ref 31.4–35)
MCV RBC AUTO: 91.4 FL (ref 82–102)
MONOCYTES # BLD: 1.1 K/UL (ref 0.1–1.3)
MONOCYTES NFR BLD: 7 % (ref 4–12)
NEUTS SEG # BLD: 6.1 K/UL (ref 1.7–8.2)
NEUTS SEG NFR BLD: 40 % (ref 43–78)
NRBC # BLD: 0 K/UL (ref 0–0.2)
PLATELET # BLD AUTO: 294 K/UL (ref 150–450)
PLATELET COMMENT: ADEQUATE
PMV BLD AUTO: 10.5 FL (ref 9.4–12.3)
POTASSIUM SERPL-SCNC: 4.2 MMOL/L (ref 3.5–5.1)
PROT SERPL-MCNC: 7.9 G/DL (ref 6.3–8.2)
RBC # BLD AUTO: 4.51 M/UL (ref 4.05–5.2)
RBC MORPH BLD: ABNORMAL
RBC MORPH BLD: ABNORMAL
SODIUM SERPL-SCNC: 139 MMOL/L (ref 133–143)
WBC # BLD AUTO: 15.2 K/UL (ref 4.3–11.1)
WBC MORPH BLD: ABNORMAL

## 2023-02-06 PROCEDURE — 99214 OFFICE O/P EST MOD 30 MIN: CPT | Performed by: INTERNAL MEDICINE

## 2023-02-06 PROCEDURE — 1036F TOBACCO NON-USER: CPT | Performed by: INTERNAL MEDICINE

## 2023-02-06 PROCEDURE — G8417 CALC BMI ABV UP PARAM F/U: HCPCS | Performed by: INTERNAL MEDICINE

## 2023-02-06 PROCEDURE — 85025 COMPLETE CBC W/AUTO DIFF WBC: CPT

## 2023-02-06 PROCEDURE — 3075F SYST BP GE 130 - 139MM HG: CPT | Performed by: INTERNAL MEDICINE

## 2023-02-06 PROCEDURE — 80053 COMPREHEN METABOLIC PANEL: CPT

## 2023-02-06 PROCEDURE — G8484 FLU IMMUNIZE NO ADMIN: HCPCS | Performed by: INTERNAL MEDICINE

## 2023-02-06 PROCEDURE — 1123F ACP DISCUSS/DSCN MKR DOCD: CPT | Performed by: INTERNAL MEDICINE

## 2023-02-06 PROCEDURE — 36415 COLL VENOUS BLD VENIPUNCTURE: CPT

## 2023-02-06 PROCEDURE — 1090F PRES/ABSN URINE INCON ASSESS: CPT | Performed by: INTERNAL MEDICINE

## 2023-02-06 PROCEDURE — G8400 PT W/DXA NO RESULTS DOC: HCPCS | Performed by: INTERNAL MEDICINE

## 2023-02-06 PROCEDURE — 3078F DIAST BP <80 MM HG: CPT | Performed by: INTERNAL MEDICINE

## 2023-02-06 PROCEDURE — 86301 IMMUNOASSAY TUMOR CA 19-9: CPT

## 2023-02-06 PROCEDURE — G8428 CUR MEDS NOT DOCUMENT: HCPCS | Performed by: INTERNAL MEDICINE

## 2023-02-06 RX ORDER — MIRTAZAPINE 15 MG/1
15 TABLET, ORALLY DISINTEGRATING ORAL PRN
COMMUNITY
Start: 2023-01-30 | End: 2024-01-30

## 2023-02-06 ASSESSMENT — PATIENT HEALTH QUESTIONNAIRE - PHQ9
SUM OF ALL RESPONSES TO PHQ QUESTIONS 1-9: 0
1. LITTLE INTEREST OR PLEASURE IN DOING THINGS: 0
SUM OF ALL RESPONSES TO PHQ QUESTIONS 1-9: 0
SUM OF ALL RESPONSES TO PHQ9 QUESTIONS 1 & 2: 0
2. FEELING DOWN, DEPRESSED OR HOPELESS: 0

## 2023-02-06 NOTE — PROGRESS NOTES
[address] [place] [address] Hematology and Oncology: Office Visit Established Patient    Chief Complaint:    Chief Complaint   Patient presents with    Follow-up         History of Present Illness:  Ms. Jane Roper is a 69 y.o. female who returns today for management of resected pancreatic cancer. 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 This was followed until September 2020 at which time there was new dilatation of the main PD, suggesting main duct IPMN. She underwent EUS with FNA which showed findings consistent with a mixed main/side branch IPMN, cytology was negative. She was followed for another 6 months, but imaging showed ongoing dilation of the main pancreatic duct and she was recommended for  distal pancreatectomy. This was completed on 5/5/21, also included splenectomy, cholecystectomy and extensive vascular dissection. 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 Post-operatively, she developed a large left pleural effusion, this was drained and did not show malignant cells. She was referred to medical oncology for recommendations. We recommended adjuvant  therapy with FOLFIRINOX for 12 cycles. She completed 12 cycles of FOLFIRINOX in January 2022 with post-treatment assessment showing ZANDRA. Here for follow-up and restaging. She is doing very well currently. She is about a year from completion of therapy. She still has some residual neuropathy in the fingers and the bottoms of her feet, she feels like her balance is somewhat affected but she isn't sure it is from the neuropathy. She has some pain in the fingers but this seems arthritic in nature. No GI symptoms except some occasional loose bowels. Energy level is better, ECOG 0. No other pain.   She denies any fevers or infectious symptoms. Review of Systems:  Constitutional: Negative. HENT: Negative. Eyes: Negative. Respiratory: Negative. Cardiovascular: Negative. Gastrointestinal: Negative. Genitourinary: Negative. Musculoskeletal: Negative. Skin: Negative. Neurological: Positive for neuropathy (sensory). Endo/Heme/Allergies: Negative. Psychiatric/Behavioral: Negative. All other systems reviewed and are negative. Allergies   Allergen Reactions    Lisinopril Palpitations     Past Medical History:   Diagnosis Date    Adverse effect of anesthesia     Oxygen dropped during EUS and Colonoscopy    Arrhythmia     AT/SVT; Followed by Dr. Diaz-Cardiologist, medication    COVID-19 vaccine series completed 03/06/2021    moderna vaccine, 2nd dose 3/6/21  documentation needed    Depressed     managed with med    GERD (gastroesophageal reflux disease)     managed with med    Hypercholesterolemia     managed with med    Hypertension     managed with med    Hypokalemia 10/26/2021    Hypomagnesemia 10/26/2021    IPMN (intraductal papillary mucinous neoplasm)     Pancreatic cyst 2021    surgery     Pancreatic cyst     Type 2 diabetes mellitus ([facility] [address]     oral agents, FBS 200s, denies hypoglycemia     Past Surgical History:   Procedure Laterality Date    BREAST BIOPSY Right     per pt was fatty tissue    CHOLECYSTECTOMY  05/2021    removed part of pancreas and spleen    COLONOSCOPY  2016    GYN      uterine fibroid    MS UNLISTED PROCEDURE ABDOMEN PERITONEUM & OMENTUM  2021    removed part of pancreas and spleen    TONSILLECTOMY      UPPER GASTROINTESTINAL ENDOSCOPY  2021     No family history on file.   Social History     Socioeconomic History    Marital status:      Spouse name: Not on file    Number of children: Not on file    Years of education: Not on file    Highest education level: Not on file   Occupational History    Not on file   Tobacco Use    Smoking status: Never    Smokeless tobacco: Never   Substance and Sexual Activity    Alcohol use: Yes     Alcohol/week: 1.0 standard drink    Drug use: Never    Sexual activity: Not on file   Other Topics Concern    Not on file   Social History Narrative    Not on file     Social Determinants of Health     Financial Resource Strain: Not on file   Food Insecurity: Not on file   Transportation Needs: Not on file   Physical Activity: Not on file   Stress: Not on file   Social Connections: Not on file   Intimate Partner Violence: Not on file   Housing Stability: Not on file     Current Outpatient Medications   Medication Sig Dispense Refill    mirtazapine (REMERON SOL-TAB) 15 MG disintegrating tablet Take 15 mg by mouth as needed      glipiZIDE (GLUCOTROL XL) 5 MG extended release tablet TAKE 1 TABLET BY MOUTH EVERY DAY      amLODIPine (NORVASC) 5 MG tablet Take 5 mg by mouth daily      atenolol (TENORMIN) 25 MG tablet Take 25 mg by mouth daily      flecainide (TAMBOCOR) 150 MG tablet Take 150 mg by mouth daily      metFORMIN (GLUCOPHAGE) 500 MG tablet 2 times daily (with meals)      pravastatin (PRAVACHOL) 10 MG tablet Take by mouth daily      sertraline (ZOLOFT) 50 MG tablet Take 50 mg by mouth daily      triamcinolone (ARISTOCORT) 0.5 % cream Apply topically 2 times daily as needed      traZODone (DESYREL) 50 MG tablet TAKE 1 TABLET BY MOUTH AT BEDTIME AS NEEDED FOR SLEEP. (Patient not taking: No sig reported)      aspirin 81 MG EC tablet Take by mouth daily (Patient not taking: No sig reported)       No current facility-administered medications for this visit. OBJECTIVE:  /66 (Site: Left Upper Arm, Position: Standing, Cuff Size: Medium Adult)   Pulse 65   Temp 98 °F (36.7 °C) (Oral)   Resp 16   Ht 5' 1\" (1.549 m)   Wt 137 lb (62.1 kg)   SpO2 97%   BMI 25.89 kg/m²     Physical Exam:  Constitutional: Well developed, well nourished female in no acute distress, sitting comfortably in the exam room chair. HEENT: Normocephalic and atraumatic. Sclerae anicteric. Neck supple without JVD. No thyromegaly present. Lymph node   No palpable submandibular, cervical, supraclavicular lymph nodes. Skin Warm and dry. No bruising and no rash noted. No erythema. No pallor. Respiratory Lungs are clear to auscultation bilaterally without wheezes, rales or rhonchi, normal air exchange without accessory muscle use. CVS Normal rate, regular rhythm and normal S1 and S2. No murmurs, gallops, or rubs. Abdomen Soft, nontender and nondistended, normoactive bowel sounds. No palpable mass. No hepatosplenomegaly. Neuro Grossly nonfocal with no obvious sensory or motor deficits. MSK Normal range of motion in general.  No edema and no tenderness. Psych Appropriate mood and affect.       Labs:  Recent Results (from the past 96 hour(s))   CBC with Auto Differential    Collection Time: 02/06/23  2:00 PM   Result Value Ref Range    WBC 15.2 (H) 4.3 - 11.1 K/uL    RBC 4.51 4.05 - 5.2 M/uL    Hemoglobin 13.5 11.7 - 15.4 g/dL    Hematocrit 41.2 35.8 - 46.3 %    MCV 91.4 82.0 - 102.0 FL    MCH 29.9 26.1 - 32.9 PG    MCHC 32.8 31.4 - 35.0 g/dL    RDW 14.1 11.9 - 14.6 %    Platelets 289 152 - 192 K/uL    MPV 10.5 9.4 - 12.3 FL    nRBC 0.00 0.0 - 0.2 K/uL    Differential Type PENDING    Cancer Antigen 19-9    Collection Time: 02/06/23  2:00 PM   Result Value Ref Range    CA 19-9 27.80 2.0 - 37.0 U/mL   Comprehensive Metabolic Panel    Collection Time: 02/06/23  2:00 PM   Result Value Ref Range    Sodium 139 133 - 143 mmol/L    Potassium 4.2 3.5 - 5.1 mmol/L    Chloride 107 101 - 110 mmol/L    CO2 28 21 - 32 mmol/L    Anion Gap 4 2 - 11 mmol/L    Glucose 154 (H) 65 - 100 mg/dL    BUN 23 8 - 23 MG/DL    Creatinine 1.20 (H) 0.6 - 1.0 MG/DL    Est, Glom Filt Rate 47 (L) >60 ml/min/1.73m2    Calcium 9.8 8.3 - 10.4 MG/DL    Total Bilirubin 0.5 0.2 - 1.1 MG/DL    ALT 22 12 - 65 U/L    AST 16 15 - 37 U/L    Alk Phosphatase 77 50 - 136 U/L    Total Protein 7.9 6.3 - 8.2 g/dL    Albumin 3.6 3.2 - 4.6 g/dL    Globulin 4.3 2.8 - 4.5 g/dL    Albumin/Globulin Ratio 0.8 0.4 - 1.6           Imaging:  CT CHEST ABDOMEN PELVIS W CONTRAST Additional Contrast? Radiologist Recommendation    Result Date: 1/23/2023  Grossly stable pancreatic body mass. No evidence of metastatic disease. If there are any questions about this report, I can be reached on [employer] or at [phone]         CT CHEST ABDOMEN PELVIS W CONTRAST Additional Contrast? Radiologist Recommendation    Result Date: 10/25/2022  1. No evidence of disease progression. 2.  Stable postsurgical changes of a distal pancreatectomy and splenectomy. The postsurgical collection appears relatively unchanged. CT CHEST ABDOMEN PELVIS W CONTRAST    Result Date: 7/28/2022  1. Postsurgical changes status post Whipple procedure with continued decrease size of a now 3.9 cm fluid collection within the resection bed. No evidence of local recurrence or distant metastasis to the chest, abdomen, or pelvis. ASSESSMENT:   Diagnosis Orders   1. Malignant neoplasm of body of pancreas [facility])                PLAN:  Lab studies were personally reviewed. Pancreatic carcinoma: ductal adenocarcinoma arising from IPMN, 3.6 cm, R0 resection with 21 total nodes negative. Post-op left pleural effusion, drained with no malignant cells, likely reactive. Although she had a successful resection without dawit  involvement, her risk of recurrence with just surgical excision is unacceptably high. I recommend adjuvant chemotherapy for risk reduction. Either mFOLFIRINOX or gemcitabine/capecitabine are acceptable, our preference has been mFOLFIRINOX for tolerance  and demonstrated efficacy vs gem alone in the PRODIGE trial.  She completed 6 months of adjuvant therapy with follow-up imaging showing no evidence of recurrent or progressive disease. Here for follow-up and restaging. She is doing very well currently. She is about a year from completion of therapy.   She still has some residual neuropathy in the fingers and the bottoms of her feet, she feels like her balance is somewhat affected but she isn't sure it is from the neuropathy. She has some pain in the fingers but this seems arthritic in nature. No GI symptoms except some occasional loose bowels. Energy level is better, ECOG 0. No other pain. She denies any fevers or infectious symptoms. Labs reviewed and adequate, WBC remains mildly increased likely post-splenectomy leukocytosis rather than an LPD. Ca 19-9 27 which is within normal range. CT reviewed and shows no evidence of recurrent disease, continued complete response. Post-operative fluid collection is stable (characterized as a \"mass\" on the CT report). Continue observation with no plans for additional therapy barring any clinical or radiographic changes. All questions were asked and answered to the best of my ability.               Mariano Vital MD, MD  [facility] Hematology and Oncology  [address] [place] [address]  Office : [phone]  Fax : [phone]

## 2023-02-06 NOTE — PATIENT INSTRUCTIONS
Patient Instructions from Today's Visit    Reason for Visit:  Follow up Pancreatic Cancer    Diagnosis Information:  https://[email or web address]/. net/about-us/asco-answers-patient-education-materials/qivo-clvhiic-kggt-sheets    Plan:  Scan and lab results reviewed   CT Scan in 4 months     Follow Up: Follow up in 4 months with labs prior    Recent Lab Results:  Hospital Outpatient Visit on 02/06/2023   Component Date Value Ref Range Status    WBC 02/06/2023 15.2 (A)  4.3 - 11.1 K/uL Final    PERIPHERAL REVIEW TO FOLLOW    RBC 02/06/2023 4.51  4.05 - 5.2 M/uL Final    Hemoglobin 02/06/2023 13.5  11.7 - 15.4 g/dL Final    Hematocrit 02/06/2023 41.2  35.8 - 46.3 % Final    MCV 02/06/2023 91.4  82.0 - 102.0 FL Final    MCH 02/06/2023 29.9  26.1 - 32.9 PG Final    MCHC 02/06/2023 32.8  31.4 - 35.0 g/dL Final    RDW 02/06/2023 14.1  11.9 - 14.6 % Final    Platelets 16/37/5007 294  150 - 450 K/uL Final    MPV 02/06/2023 10.5  9.4 - 12.3 FL Final    nRBC 02/06/2023 0.00  0.0 - 0.2 K/uL Final    **Note: Absolute NRBC parameter is now reported with Hemogram**    Differential Type 02/06/2023 PENDING   Incomplete    CA 19-9 02/06/2023 27.80  2.0 - 37.0 U/mL Final    [employer]. Patient's results of tumor marker testing may not be comparable to labs using different manufacturers/methods. Sodium 02/06/2023 139  133 - 143 mmol/L Final    Potassium 02/06/2023 4.2  3.5 - 5.1 mmol/L Final    Chloride 02/06/2023 107  101 - 110 mmol/L Final    CO2 02/06/2023 28  21 - 32 mmol/L Final    Anion Gap 02/06/2023 4  2 - 11 mmol/L Final    Glucose 02/06/2023 154 (A)  65 - 100 mg/dL Final    BUN 02/06/2023 23  8 - 23 MG/DL Final    Creatinine 02/06/2023 1.20 (A)  0.6 - 1.0 MG/DL Final    Est, Glom Filt Rate 02/06/2023 47 (A)  >60 ml/min/1.73m2 Final    Comment:      Pediatric calculator link: [employer].at. org/professionals/kdoqi/gfr_calculatorped       These results are not intended for use in patients <25years of age. eGFR results are calculated without a race factor using  the 2021 CKD-EPI equation. Careful clinical correlation is recommended, particularly when comparing to results calculated using previous equations. The CKD-EPI equation is less accurate in patients with extremes of muscle mass, extra-renal metabolism of creatinine, excessive creatine ingestion, or following therapy that affects renal tubular secretion. Calcium 02/06/2023 9.8  8.3 - 10.4 MG/DL Final    Total Bilirubin 02/06/2023 0.5  0.2 - 1.1 MG/DL Final    ALT 02/06/2023 22  12 - 65 U/L Final    AST 02/06/2023 16  15 - 37 U/L Final    Alk Phosphatase 02/06/2023 77  50 - 136 U/L Final    Total Protein 02/06/2023 7.9  6.3 - 8.2 g/dL Final    Albumin 02/06/2023 3.6  3.2 - 4.6 g/dL Final    Globulin 02/06/2023 4.3  2.8 - 4.5 g/dL Final    Albumin/Globulin Ratio 02/06/2023 0.8  0.4 - 1.6   Final     Treatment Summary has been discussed and given to patient: n/a    -------------------------------------------------------------------------------------------------------------------  Please call our office at [phone] if you have any  of the following symptoms:   Fever of 100.5 or greater  Chills  Shortness of breath  Swelling or pain in one leg    After office hours an answering service is available and will contact a provider for emergencies or if you are experiencing any of the above symptoms. Patient does express an interest in My Chart. My Chart log in information explained on the after visit summary printout at the [facility] [address] desk.     Alice Danielle RN

## 2023-05-22 ENCOUNTER — TELEPHONE (OUTPATIENT)
Dept: ONCOLOGY | Age: 78
End: 2023-05-22

## 2023-05-30 ENCOUNTER — HOSPITAL ENCOUNTER (OUTPATIENT)
Dept: CT IMAGING | Age: 78
Discharge: HOME | End: 2023-06-02
Payer: MEDICARE

## 2023-05-30 DIAGNOSIS — C25.1 MALIGNANT NEOPLASM OF BODY OF PANCREAS (HCC): ICD-10-CM

## 2023-05-30 LAB — CREAT BLD-MCNC: 1.31 MG/DL (ref 0.8–1.5)

## 2023-05-30 PROCEDURE — 6360000004 HC RX CONTRAST MEDICATION: Performed by: INTERNAL MEDICINE

## 2023-05-30 PROCEDURE — 71260 CT THORAX DX C+: CPT

## 2023-05-30 PROCEDURE — 82565 ASSAY OF CREATININE: CPT

## 2023-05-30 RX ADMIN — DIATRIZOATE MEGLUMINE AND DIATRIZOATE SODIUM 15 ML: 660; 100 LIQUID ORAL; RECTAL at 13:03

## 2023-05-30 RX ADMIN — IOPAMIDOL 100 ML: 755 INJECTION, SOLUTION INTRAVENOUS at 13:03

## 2023-05-30 NOTE — TELEPHONE ENCOUNTER
Per Dr. El Hoffman this shouldn't be an issue since she hasnt had pancreatitis which is the main contraindication.  Call back to patient to notify

## 2023-06-08 DIAGNOSIS — C25.1 MALIGNANT NEOPLASM OF BODY OF PANCREAS (HCC): Primary | ICD-10-CM

## 2023-10-02 DIAGNOSIS — C25.1 MALIGNANT NEOPLASM OF BODY OF PANCREAS (HCC): Primary | ICD-10-CM

## 2023-10-05 ENCOUNTER — HOSPITAL ENCOUNTER (OUTPATIENT)
Dept: CT IMAGING | Age: 78
Discharge: HOME | End: 2023-10-08
Attending: INTERNAL MEDICINE

## 2023-10-05 DIAGNOSIS — C25.1 MALIGNANT NEOPLASM OF BODY OF PANCREAS (HCC): ICD-10-CM

## 2023-10-09 ENCOUNTER — OFFICE VISIT (OUTPATIENT)
Dept: ONCOLOGY | Age: 78
End: 2023-10-09
Payer: MEDICARE

## 2023-10-09 ENCOUNTER — HOSPITAL ENCOUNTER (OUTPATIENT)
Dept: LAB | Age: 78
Discharge: HOME | End: 2023-10-12
Payer: MEDICARE

## 2023-10-09 VITALS
OXYGEN SATURATION: 97 % | HEART RATE: 57 BPM | TEMPERATURE: 98 F | RESPIRATION RATE: 18 BRPM | WEIGHT: 137 LBS | DIASTOLIC BLOOD PRESSURE: 64 MMHG | HEIGHT: 61 IN | BODY MASS INDEX: 25.86 KG/M2 | SYSTOLIC BLOOD PRESSURE: 165 MMHG

## 2023-10-09 DIAGNOSIS — C25.1 MALIGNANT NEOPLASM OF BODY OF PANCREAS (HCC): Primary | ICD-10-CM

## 2023-10-09 DIAGNOSIS — C25.1 MALIGNANT NEOPLASM OF BODY OF PANCREAS (HCC): ICD-10-CM

## 2023-10-09 LAB
ALBUMIN SERPL-MCNC: 3.4 G/DL (ref 3.2–4.6)
ALBUMIN/GLOB SERPL: 0.8 (ref 0.4–1.6)
ALP SERPL-CCNC: 106 U/L (ref 50–136)
ALT SERPL-CCNC: 26 U/L (ref 12–65)
ANION GAP SERPL CALC-SCNC: 4 MMOL/L (ref 2–11)
AST SERPL-CCNC: 24 U/L (ref 15–37)
BASOPHILS # BLD: 0.1 K/UL (ref 0–0.2)
BASOPHILS NFR BLD: 1 % (ref 0–2)
BILIRUB SERPL-MCNC: 0.5 MG/DL (ref 0.2–1.1)
BUN SERPL-MCNC: 23 MG/DL (ref 8–23)
CALCIUM SERPL-MCNC: 9.4 MG/DL (ref 8.3–10.4)
CANCER AG19-9 SERPL-ACNC: 46 U/ML (ref 2–37)
CHLORIDE SERPL-SCNC: 103 MMOL/L (ref 101–110)
CO2 SERPL-SCNC: 31 MMOL/L (ref 21–32)
CREAT SERPL-MCNC: 1.6 MG/DL (ref 0.6–1)
DIFFERENTIAL METHOD BLD: ABNORMAL
EOSINOPHIL # BLD: 0.4 K/UL (ref 0–0.8)
EOSINOPHIL NFR BLD: 3 % (ref 0.5–7.8)
ERYTHROCYTE [DISTWIDTH] IN BLOOD BY AUTOMATED COUNT: 14 % (ref 11.9–14.6)
GLOBULIN SER CALC-MCNC: 4.5 G/DL (ref 2.8–4.5)
GLUCOSE SERPL-MCNC: 345 MG/DL (ref 65–100)
HCT VFR BLD AUTO: 43.7 % (ref 35.8–46.3)
HGB BLD-MCNC: 13.8 G/DL (ref 11.7–15.4)
IMM GRANULOCYTES # BLD AUTO: 0 K/UL (ref 0–0.5)
IMM GRANULOCYTES NFR BLD AUTO: 0 % (ref 0–5)
LYMPHOCYTES # BLD: 5.9 K/UL (ref 0.5–4.6)
LYMPHOCYTES NFR BLD: 49 % (ref 13–44)
MCH RBC QN AUTO: 28.6 PG (ref 26.1–32.9)
MCHC RBC AUTO-ENTMCNC: 31.6 G/DL (ref 31.4–35)
MCV RBC AUTO: 90.7 FL (ref 82–102)
MONOCYTES # BLD: 1.2 K/UL (ref 0.1–1.3)
MONOCYTES NFR BLD: 10 % (ref 4–12)
NEUTS SEG # BLD: 4.5 K/UL (ref 1.7–8.2)
NEUTS SEG NFR BLD: 37 % (ref 43–78)
NRBC # BLD: 0 K/UL (ref 0–0.2)
PLATELET # BLD AUTO: 288 K/UL (ref 150–450)
PLATELET COMMENT: ADEQUATE
PMV BLD AUTO: 11.2 FL (ref 9.4–12.3)
POTASSIUM SERPL-SCNC: 4.5 MMOL/L (ref 3.5–5.1)
PROT SERPL-MCNC: 7.9 G/DL (ref 6.3–8.2)
RBC # BLD AUTO: 4.82 M/UL (ref 4.05–5.2)
RBC MORPH BLD: ABNORMAL
SODIUM SERPL-SCNC: 138 MMOL/L (ref 133–143)
WBC # BLD AUTO: 12.1 K/UL (ref 4.3–11.1)
WBC MORPH BLD: ABNORMAL

## 2023-10-09 PROCEDURE — 1090F PRES/ABSN URINE INCON ASSESS: CPT | Performed by: INTERNAL MEDICINE

## 2023-10-09 PROCEDURE — 85025 COMPLETE CBC W/AUTO DIFF WBC: CPT

## 2023-10-09 PROCEDURE — 1036F TOBACCO NON-USER: CPT | Performed by: INTERNAL MEDICINE

## 2023-10-09 PROCEDURE — 80053 COMPREHEN METABOLIC PANEL: CPT

## 2023-10-09 PROCEDURE — G8428 CUR MEDS NOT DOCUMENT: HCPCS | Performed by: INTERNAL MEDICINE

## 2023-10-09 PROCEDURE — G8417 CALC BMI ABV UP PARAM F/U: HCPCS | Performed by: INTERNAL MEDICINE

## 2023-10-09 PROCEDURE — 86301 IMMUNOASSAY TUMOR CA 19-9: CPT

## 2023-10-09 PROCEDURE — 3077F SYST BP >= 140 MM HG: CPT | Performed by: INTERNAL MEDICINE

## 2023-10-09 PROCEDURE — G8400 PT W/DXA NO RESULTS DOC: HCPCS | Performed by: INTERNAL MEDICINE

## 2023-10-09 PROCEDURE — 99214 OFFICE O/P EST MOD 30 MIN: CPT | Performed by: INTERNAL MEDICINE

## 2023-10-09 PROCEDURE — 36415 COLL VENOUS BLD VENIPUNCTURE: CPT

## 2023-10-09 PROCEDURE — 3078F DIAST BP <80 MM HG: CPT | Performed by: INTERNAL MEDICINE

## 2023-10-09 PROCEDURE — G8484 FLU IMMUNIZE NO ADMIN: HCPCS | Performed by: INTERNAL MEDICINE

## 2023-10-09 PROCEDURE — 1123F ACP DISCUSS/DSCN MKR DOCD: CPT | Performed by: INTERNAL MEDICINE

## 2023-10-09 RX ORDER — INSULIN GLARGINE 100 [IU]/ML
INJECTION, SOLUTION SUBCUTANEOUS
COMMUNITY
Start: 2023-08-19

## 2023-10-09 ASSESSMENT — PATIENT HEALTH QUESTIONNAIRE - PHQ9
1. LITTLE INTEREST OR PLEASURE IN DOING THINGS: 0
SUM OF ALL RESPONSES TO PHQ QUESTIONS 1-9: 0
SUM OF ALL RESPONSES TO PHQ QUESTIONS 1-9: 0
SUM OF ALL RESPONSES TO PHQ9 QUESTIONS 1 & 2: 0
SUM OF ALL RESPONSES TO PHQ QUESTIONS 1-9: 0
2. FEELING DOWN, DEPRESSED OR HOPELESS: 0
SUM OF ALL RESPONSES TO PHQ QUESTIONS 1-9: 0

## 2023-10-09 NOTE — PROGRESS NOTES
diverticulosis present, as was seen previously. No CT evidence of diverticulitis. The bladder and rectum are normal. No pelvic adenopathy seen. No free air or free fluid seen within the pelvis. Bone window evaluation demonstrates no aggressive osseous lesions. 1. Stable mass within the body of the pancreas. 2. Tiny right lower lobe pulmonary nodule measuring 3 mm. 3 month follow-up recommended to ensure stability. Would consider CT chest, abdomen, and pelvis at that time. CT CHEST ABDOMEN PELVIS W CONTRAST Additional Contrast? Oral    Result Date: 5/30/2023  1. Pancreas mass measures slightly smaller. No evidence of metastatic disease. If there are any questions about this report, I can be reached on [employer] or at [phone]         CT CHEST ABDOMEN PELVIS W CONTRAST Additional Contrast? Radiologist Recommendation    Result Date: 1/23/2023  Grossly stable pancreatic body mass. No evidence of metastatic disease. If there are any questions about this report, I can be reached on [employer] or at [phone]         CT CHEST ABDOMEN PELVIS W CONTRAST Additional Contrast? Radiologist Recommendation    Result Date: 10/25/2022  1. No evidence of disease progression. 2.  Stable postsurgical changes of a distal pancreatectomy and splenectomy. The postsurgical collection appears relatively unchanged. CT CHEST ABDOMEN PELVIS W CONTRAST    Result Date: 7/28/2022  1. Postsurgical changes status post Whipple procedure with continued decrease size of a now 3.9 cm fluid collection within the resection bed. No evidence of local recurrence or distant metastasis to the chest, abdomen, or pelvis. ASSESSMENT:   Diagnosis Orders   1. Malignant neoplasm of body of pancreas ([address])  CT CHEST ABDOMEN PELVIS W CONTRAST Additional Contrast? None                  PLAN:  Lab studies were personally reviewed.     Pancreatic carcinoma: ductal adenocarcinoma arising from IPMN, 3.6 cm, R0 resection with 21 total

## 2023-10-09 NOTE — PATIENT INSTRUCTIONS
Patient Instructions from Today's Visit    Reason for Visit:  Follow up Pancreatic Cancer     Diagnosis Information:  [email or web address]/. net/about-us/asco-answers-patient-education-materials/unlb-xgmgzgg-xxuo-sheets    Plan:  Lab results reviewed     Follow Up:   Follow up in 3 months     Recent Lab Results:  Hospital Outpatient Visit on 10/09/2023   Component Date Value Ref Range Status    WBC 10/09/2023 12.1 (H)  4.3 - 11.1 K/uL Final    Comment: RESULTS CHECKED X 2  PERIPHERAL REVIEW TO FOLLOW      RBC 10/09/2023 4.82  4.05 - 5.2 M/uL Final    Hemoglobin 10/09/2023 13.8  11.7 - 15.4 g/dL Final    Hematocrit 10/09/2023 43.7  35.8 - 46.3 % Final    MCV 10/09/2023 90.7  82.0 - 102.0 FL Final    MCH 10/09/2023 28.6  26.1 - 32.9 PG Final    MCHC 10/09/2023 31.6  31.4 - 35.0 g/dL Final    RDW 10/09/2023 14.0  11.9 - 14.6 % Final    Platelets 70/10/4551 288  150 - 450 K/uL Final    MPV 10/09/2023 11.2  9.4 - 12.3 FL Final    nRBC 10/09/2023 0.00  0.0 - 0.2 K/uL Final    **Note: Absolute NRBC parameter is now reported with Hemogram**    Neutrophils % 10/09/2023 37 (L)  43 - 78 % Final    Lymphocytes % 10/09/2023 49 (H)  13 - 44 % Final    Monocytes % 10/09/2023 10  4.0 - 12.0 % Final    Eosinophils % 10/09/2023 3  0.5 - 7.8 % Final    Basophils % 10/09/2023 1  0.0 - 2.0 % Final    Immature Granulocytes 10/09/2023 0  0.0 - 5.0 % Final    Neutrophils Absolute 10/09/2023 4.5  1.7 - 8.2 K/UL Final    Lymphocytes Absolute 10/09/2023 5.9 (H)  0.5 - 4.6 K/UL Final    Monocytes Absolute 10/09/2023 1.2  0.1 - 1.3 K/UL Final    Eosinophils Absolute 10/09/2023 0.4  0.0 - 0.8 K/UL Final    Basophils Absolute 10/09/2023 0.1  0.0 - 0.2 K/UL Final    Absolute Immature Granulocyte 10/09/2023 0.0  0.0 - 0.5 K/UL Final    RBC Comment 10/09/2023 NORMOCYTIC/NORMOCHROMIC    Final    WBC Comment 10/09/2023 Result Confirmed By Smear    Final    Comment: OCCASIONAL  ATYPICAL LYMPHOCYTES PRESENT      Platelet Comment 74/62/5804 ADEQUATE

## 2024-01-03 ENCOUNTER — HOSPITAL ENCOUNTER (OUTPATIENT)
Dept: CT IMAGING | Age: 79
Discharge: HOME | End: 2024-01-06
Attending: INTERNAL MEDICINE
Payer: MEDICARE

## 2024-01-03 DIAGNOSIS — C25.1 MALIGNANT NEOPLASM OF BODY OF PANCREAS (HCC): ICD-10-CM

## 2024-01-03 LAB — CREAT BLD-MCNC: 0.89 MG/DL (ref 0.8–1.5)

## 2024-01-03 PROCEDURE — 82565 ASSAY OF CREATININE: CPT

## 2024-01-03 PROCEDURE — 71260 CT THORAX DX C+: CPT | Performed by: RADIOLOGY

## 2024-01-03 PROCEDURE — 6360000004 HC RX CONTRAST MEDICATION: Performed by: INTERNAL MEDICINE

## 2024-01-03 PROCEDURE — 74177 CT ABD & PELVIS W/CONTRAST: CPT | Performed by: RADIOLOGY

## 2024-01-03 PROCEDURE — 71260 CT THORAX DX C+: CPT

## 2024-01-03 RX ADMIN — IOPAMIDOL 100 ML: 755 INJECTION, SOLUTION INTRAVENOUS at 12:07

## 2024-01-03 RX ADMIN — DIATRIZOATE MEGLUMINE AND DIATRIZOATE SODIUM 15 ML: 660; 100 LIQUID ORAL; RECTAL at 12:07

## 2024-01-08 DIAGNOSIS — C25.1 MALIGNANT NEOPLASM OF BODY OF PANCREAS (HCC): Primary | ICD-10-CM

## 2024-01-15 ENCOUNTER — OFFICE VISIT (OUTPATIENT)
Dept: ONCOLOGY | Age: 79
End: 2024-01-15

## 2024-01-15 ENCOUNTER — HOSPITAL ENCOUNTER (OUTPATIENT)
Dept: LAB | Age: 79
Discharge: HOME | End: 2024-01-18
Payer: MEDICARE

## 2024-01-15 VITALS
RESPIRATION RATE: 16 BRPM | HEIGHT: 61 IN | WEIGHT: 140 LBS | TEMPERATURE: 98.4 F | DIASTOLIC BLOOD PRESSURE: 75 MMHG | BODY MASS INDEX: 26.43 KG/M2 | HEART RATE: 70 BPM | SYSTOLIC BLOOD PRESSURE: 153 MMHG | OXYGEN SATURATION: 93 %

## 2024-01-15 DIAGNOSIS — C25.1 MALIGNANT NEOPLASM OF BODY OF PANCREAS (HCC): ICD-10-CM

## 2024-01-15 DIAGNOSIS — Z79.4 TYPE 2 DIABETES MELLITUS WITH HYPERGLYCEMIA, WITH LONG-TERM CURRENT USE OF INSULIN (HCC): ICD-10-CM

## 2024-01-15 DIAGNOSIS — E11.65 TYPE 2 DIABETES MELLITUS WITH HYPERGLYCEMIA, WITH LONG-TERM CURRENT USE OF INSULIN (HCC): ICD-10-CM

## 2024-01-15 DIAGNOSIS — C25.1 MALIGNANT NEOPLASM OF BODY OF PANCREAS (HCC): Primary | ICD-10-CM

## 2024-01-15 LAB
ALBUMIN SERPL-MCNC: 3.1 G/DL (ref 3.2–4.6)
ALBUMIN/GLOB SERPL: 0.7 (ref 0.4–1.6)
ALP SERPL-CCNC: 130 U/L (ref 50–136)
ALT SERPL-CCNC: 21 U/L (ref 12–65)
ANION GAP SERPL CALC-SCNC: 7 MMOL/L (ref 2–11)
AST SERPL-CCNC: 21 U/L (ref 15–37)
BASOPHILS # BLD: 0.1 K/UL (ref 0–0.2)
BASOPHILS NFR BLD: 1 % (ref 0–2)
BILIRUB SERPL-MCNC: 0.4 MG/DL (ref 0.2–1.1)
BUN SERPL-MCNC: 12 MG/DL (ref 8–23)
CALCIUM SERPL-MCNC: 8.9 MG/DL (ref 8.3–10.4)
CANCER AG19-9 SERPL-ACNC: 19.8 U/ML (ref 2–37)
CHLORIDE SERPL-SCNC: 104 MMOL/L (ref 103–113)
CO2 SERPL-SCNC: 27 MMOL/L (ref 21–32)
CREAT SERPL-MCNC: 1.4 MG/DL (ref 0.6–1)
DIFFERENTIAL METHOD BLD: ABNORMAL
EOSINOPHIL # BLD: 0.2 K/UL (ref 0–0.8)
EOSINOPHIL NFR BLD: 2 % (ref 0.5–7.8)
ERYTHROCYTE [DISTWIDTH] IN BLOOD BY AUTOMATED COUNT: 14.2 % (ref 11.9–14.6)
GLOBULIN SER CALC-MCNC: 4.5 G/DL (ref 2.8–4.5)
GLUCOSE SERPL-MCNC: 326 MG/DL (ref 65–100)
HCT VFR BLD AUTO: 40 % (ref 35.8–46.3)
HGB BLD-MCNC: 13.1 G/DL (ref 11.7–15.4)
IMM GRANULOCYTES # BLD AUTO: 0 K/UL (ref 0–0.5)
IMM GRANULOCYTES NFR BLD AUTO: 0 % (ref 0–5)
LYMPHOCYTES # BLD: 4.6 K/UL (ref 0.5–4.6)
LYMPHOCYTES NFR BLD: 39 % (ref 13–44)
MCH RBC QN AUTO: 29 PG (ref 26.1–32.9)
MCHC RBC AUTO-ENTMCNC: 32.8 G/DL (ref 31.4–35)
MCV RBC AUTO: 88.7 FL (ref 82–102)
MONOCYTES # BLD: 1.2 K/UL (ref 0.1–1.3)
MONOCYTES NFR BLD: 10 % (ref 4–12)
NEUTS SEG # BLD: 5.7 K/UL (ref 1.7–8.2)
NEUTS SEG NFR BLD: 48 % (ref 43–78)
NRBC # BLD: 0 K/UL (ref 0–0.2)
PLATELET # BLD AUTO: 362 K/UL (ref 150–450)
PMV BLD AUTO: 10.4 FL (ref 9.4–12.3)
POTASSIUM SERPL-SCNC: 4.1 MMOL/L (ref 3.5–5.1)
PROT SERPL-MCNC: 7.6 G/DL (ref 6.3–8.2)
RBC # BLD AUTO: 4.51 M/UL (ref 4.05–5.2)
SODIUM SERPL-SCNC: 138 MMOL/L (ref 136–146)
WBC # BLD AUTO: 11.8 K/UL (ref 4.3–11.1)

## 2024-01-15 PROCEDURE — 85025 COMPLETE CBC W/AUTO DIFF WBC: CPT

## 2024-01-15 PROCEDURE — 86301 IMMUNOASSAY TUMOR CA 19-9: CPT

## 2024-01-15 PROCEDURE — 80053 COMPREHEN METABOLIC PANEL: CPT

## 2024-01-15 PROCEDURE — 36415 COLL VENOUS BLD VENIPUNCTURE: CPT

## 2024-01-15 ASSESSMENT — PATIENT HEALTH QUESTIONNAIRE - PHQ9
3. TROUBLE FALLING OR STAYING ASLEEP: 1
SUM OF ALL RESPONSES TO PHQ9 QUESTIONS 1 & 2: 0
SUM OF ALL RESPONSES TO PHQ QUESTIONS 1-9: 2
4. FEELING TIRED OR HAVING LITTLE ENERGY: 1
SUM OF ALL RESPONSES TO PHQ QUESTIONS 1-9: 2
8. MOVING OR SPEAKING SO SLOWLY THAT OTHER PEOPLE COULD HAVE NOTICED. OR THE OPPOSITE, BEING SO FIGETY OR RESTLESS THAT YOU HAVE BEEN MOVING AROUND A LOT MORE THAN USUAL: 0
2. FEELING DOWN, DEPRESSED OR HOPELESS: 0
9. THOUGHTS THAT YOU WOULD BE BETTER OFF DEAD, OR OF HURTING YOURSELF: 0
5. POOR APPETITE OR OVEREATING: 0
SUM OF ALL RESPONSES TO PHQ QUESTIONS 1-9: 2
1. LITTLE INTEREST OR PLEASURE IN DOING THINGS: 0
7. TROUBLE CONCENTRATING ON THINGS, SUCH AS READING THE NEWSPAPER OR WATCHING TELEVISION: 0
6. FEELING BAD ABOUT YOURSELF - OR THAT YOU ARE A FAILURE OR HAVE LET YOURSELF OR YOUR FAMILY DOWN: 0
SUM OF ALL RESPONSES TO PHQ QUESTIONS 1-9: 2
10. IF YOU CHECKED OFF ANY PROBLEMS, HOW DIFFICULT HAVE THESE PROBLEMS MADE IT FOR YOU TO DO YOUR WORK, TAKE CARE OF THINGS AT HOME, OR GET ALONG WITH OTHER PEOPLE: 0

## 2024-01-15 ASSESSMENT — ANXIETY QUESTIONNAIRES
IF YOU CHECKED OFF ANY PROBLEMS ON THIS QUESTIONNAIRE, HOW DIFFICULT HAVE THESE PROBLEMS MADE IT FOR YOU TO DO YOUR WORK, TAKE CARE OF THINGS AT HOME, OR GET ALONG WITH OTHER PEOPLE: NOT DIFFICULT AT ALL
GAD7 TOTAL SCORE: 0
7. FEELING AFRAID AS IF SOMETHING AWFUL MIGHT HAPPEN: 0
4. TROUBLE RELAXING: 0
6. BECOMING EASILY ANNOYED OR IRRITABLE: 0
5. BEING SO RESTLESS THAT IT IS HARD TO SIT STILL: 0
3. WORRYING TOO MUCH ABOUT DIFFERENT THINGS: 0
2. NOT BEING ABLE TO STOP OR CONTROL WORRYING: 0
1. FEELING NERVOUS, ANXIOUS, OR ON EDGE: 0

## 2024-01-15 NOTE — PROGRESS NOTES
Negative.   HENT: Negative.   Eyes: Negative.   Respiratory: Negative.   Cardiovascular: Negative.   Gastrointestinal: Negative.   Genitourinary: Negative.   Musculoskeletal: Negative.   Skin: Negative.   Neurological: Negative.   Endo/Heme/Allergies: Negative.   Psychiatric/Behavioral: Negative.   All other systems reviewed and are negative.       Allergies   Allergen Reactions    Lisinopril Palpitations     Past Medical History:   Diagnosis Date    Adverse effect of anesthesia     Oxygen dropped during EUS and Colonoscopy    Arrhythmia     AT/SVT; Followed by Dr. Diaz-Cardiologist, medication    COVID-19 vaccine series completed 03/06/2021    moderna vaccine, 2nd dose 3/6/21  documentation needed    Depressed     managed with med    GERD (gastroesophageal reflux disease)     managed with med    Hypercholesterolemia     managed with med    Hypertension     managed with med    Hypokalemia 10/26/2021    Hypomagnesemia 10/26/2021    IPMN (intraductal papillary mucinous neoplasm)     Pancreatic cyst 2021    surgery     Pancreatic cyst     Type 2 diabetes mellitus (HCC)     oral agents, FBS 200s, denies hypoglycemia     Past Surgical History:   Procedure Laterality Date    BREAST BIOPSY Right     per pt was fatty tissue    CHOLECYSTECTOMY  05/2021    removed part of pancreas and spleen    COLONOSCOPY  2016    GYN      uterine fibroid    PA UNLISTED PROCEDURE ABDOMEN PERITONEUM & OMENTUM  2021    removed part of pancreas and spleen    TONSILLECTOMY      UPPER GASTROINTESTINAL ENDOSCOPY  2021     No family history on file.  Social History     Socioeconomic History    Marital status:      Spouse name: Not on file    Number of children: Not on file    Years of education: Not on file    Highest education level: Not on file   Occupational History    Not on file   Tobacco Use    Smoking status: Never    Smokeless tobacco: Never   Substance and Sexual Activity    Alcohol use: Yes     Alcohol/week: 1.0 standard drink of

## 2024-01-15 NOTE — PATIENT INSTRUCTIONS
Patient Instructions from Today's Visit    Reason for Visit:  Follow up Pancreatic Cancer     Diagnosis Information:  https://www.cancer.net/about-us/asco-answers-patient-education-materials/juju-fpkyfih-uomm-sheets    Plan:  Lab results reviewed. CT scan reviewed. Everything looks great and your tumor marker is down to     Follow Up:  Follow up in     Recent Lab Results:  Hospital Outpatient Visit on 01/15/2024   Component Date Value Ref Range Status    WBC 01/15/2024 11.8 (H)  4.3 - 11.1 K/uL Final    RBC 01/15/2024 4.51  4.05 - 5.2 M/uL Final    Hemoglobin 01/15/2024 13.1  11.7 - 15.4 g/dL Final    Hematocrit 01/15/2024 40.0  35.8 - 46.3 % Final    MCV 01/15/2024 88.7  82.0 - 102.0 FL Final    MCH 01/15/2024 29.0  26.1 - 32.9 PG Final    MCHC 01/15/2024 32.8  31.4 - 35.0 g/dL Final    RDW 01/15/2024 14.2  11.9 - 14.6 % Final    Platelets 01/15/2024 362  150 - 450 K/uL Final    MPV 01/15/2024 10.4  9.4 - 12.3 FL Final    nRBC 01/15/2024 0.00  0.0 - 0.2 K/uL Final    **Note: Absolute NRBC parameter is now reported with Hemogram**    Neutrophils % 01/15/2024 48  43 - 78 % Final    Lymphocytes % 01/15/2024 39  13 - 44 % Final    Monocytes % 01/15/2024 10  4.0 - 12.0 % Final    Eosinophils % 01/15/2024 2  0.5 - 7.8 % Final    Basophils % 01/15/2024 1  0.0 - 2.0 % Final    Immature Granulocytes 01/15/2024 0  0.0 - 5.0 % Final    Neutrophils Absolute 01/15/2024 5.7  1.7 - 8.2 K/UL Final    Lymphocytes Absolute 01/15/2024 4.6  0.5 - 4.6 K/UL Final    Monocytes Absolute 01/15/2024 1.2  0.1 - 1.3 K/UL Final    Eosinophils Absolute 01/15/2024 0.2  0.0 - 0.8 K/UL Final    Basophils Absolute 01/15/2024 0.1  0.0 - 0.2 K/UL Final    Absolute Immature Granulocyte 01/15/2024 0.0  0.0 - 0.5 K/UL Final    Differential Type 01/15/2024 AUTOMATED    Final    Sodium 01/15/2024 138  136 - 146 mmol/L Final    Potassium 01/15/2024 4.1  3.5 - 5.1 mmol/L Final    Chloride 01/15/2024 104  103 - 113 mmol/L Final    CO2 01/15/2024 27  21 -

## 2024-04-24 ENCOUNTER — HOSPITAL ENCOUNTER (OUTPATIENT)
Dept: LAB | Age: 79
Discharge: HOME | End: 2024-04-27
Payer: MEDICARE

## 2024-04-24 ENCOUNTER — OFFICE VISIT (OUTPATIENT)
Dept: ONCOLOGY | Age: 79
End: 2024-04-24
Payer: MEDICARE

## 2024-04-24 VITALS
TEMPERATURE: 98.5 F | RESPIRATION RATE: 20 BRPM | DIASTOLIC BLOOD PRESSURE: 67 MMHG | BODY MASS INDEX: 27 KG/M2 | SYSTOLIC BLOOD PRESSURE: 159 MMHG | OXYGEN SATURATION: 96 % | HEART RATE: 60 BPM | WEIGHT: 143 LBS | HEIGHT: 61 IN

## 2024-04-24 DIAGNOSIS — C25.1 MALIGNANT NEOPLASM OF BODY OF PANCREAS (HCC): Primary | ICD-10-CM

## 2024-04-24 DIAGNOSIS — Z79.4 TYPE 2 DIABETES MELLITUS WITH HYPERGLYCEMIA, WITH LONG-TERM CURRENT USE OF INSULIN (HCC): ICD-10-CM

## 2024-04-24 DIAGNOSIS — C25.1 MALIGNANT NEOPLASM OF BODY OF PANCREAS (HCC): ICD-10-CM

## 2024-04-24 DIAGNOSIS — E11.65 TYPE 2 DIABETES MELLITUS WITH HYPERGLYCEMIA, WITH LONG-TERM CURRENT USE OF INSULIN (HCC): ICD-10-CM

## 2024-04-24 LAB
ALBUMIN SERPL-MCNC: 3.3 G/DL (ref 3.2–4.6)
ALBUMIN/GLOB SERPL: 0.8 (ref 1–1.9)
ALP SERPL-CCNC: 109 U/L (ref 35–104)
ALT SERPL-CCNC: 6 U/L (ref 12–65)
ANION GAP SERPL CALC-SCNC: 12 MMOL/L (ref 9–18)
AST SERPL-CCNC: 25 U/L (ref 15–37)
BASOPHILS # BLD: 0.1 K/UL (ref 0–0.2)
BASOPHILS NFR BLD: 1 % (ref 0–2)
BILIRUB SERPL-MCNC: 0.5 MG/DL (ref 0–1.2)
BUN SERPL-MCNC: 24 MG/DL (ref 8–23)
CALCIUM SERPL-MCNC: 9.3 MG/DL (ref 8.8–10.2)
CANCER AG19-9 SERPL-ACNC: 26.5 U/ML (ref 0–35)
CHLORIDE SERPL-SCNC: 100 MMOL/L (ref 98–107)
CO2 SERPL-SCNC: 25 MMOL/L (ref 20–28)
CREAT SERPL-MCNC: 1.4 MG/DL (ref 0.6–1.1)
DIFFERENTIAL METHOD BLD: ABNORMAL
EOSINOPHIL # BLD: 0.4 K/UL (ref 0–0.8)
EOSINOPHIL NFR BLD: 3 % (ref 0.5–7.8)
ERYTHROCYTE [DISTWIDTH] IN BLOOD BY AUTOMATED COUNT: 14.6 % (ref 11.9–14.6)
GLOBULIN SER CALC-MCNC: 4.4 G/DL (ref 2.3–3.5)
GLUCOSE SERPL-MCNC: 371 MG/DL (ref 70–99)
HCT VFR BLD AUTO: 41.8 % (ref 35.8–46.3)
HGB BLD-MCNC: 13 G/DL (ref 11.7–15.4)
IMM GRANULOCYTES # BLD AUTO: 0 K/UL (ref 0–0.5)
IMM GRANULOCYTES NFR BLD AUTO: 0 % (ref 0–5)
LYMPHOCYTES # BLD: 4.6 K/UL (ref 0.5–4.6)
LYMPHOCYTES NFR BLD: 38 % (ref 13–44)
MCH RBC QN AUTO: 27.5 PG (ref 26.1–32.9)
MCHC RBC AUTO-ENTMCNC: 31.1 G/DL (ref 31.4–35)
MCV RBC AUTO: 88.6 FL (ref 82–102)
MONOCYTES # BLD: 1.1 K/UL (ref 0.1–1.3)
MONOCYTES NFR BLD: 9 % (ref 4–12)
NEUTS SEG # BLD: 5.9 K/UL (ref 1.7–8.2)
NEUTS SEG NFR BLD: 49 % (ref 43–78)
NRBC # BLD: 0 K/UL (ref 0–0.2)
PLATELET # BLD AUTO: 270 K/UL (ref 150–450)
PMV BLD AUTO: 11.1 FL (ref 9.4–12.3)
POTASSIUM SERPL-SCNC: 4.6 MMOL/L (ref 3.5–5.1)
PROT SERPL-MCNC: 7.7 G/DL (ref 6.3–8.2)
RBC # BLD AUTO: 4.72 M/UL (ref 4.05–5.2)
SODIUM SERPL-SCNC: 137 MMOL/L (ref 136–145)
WBC # BLD AUTO: 12.1 K/UL (ref 4.3–11.1)

## 2024-04-24 PROCEDURE — 3078F DIAST BP <80 MM HG: CPT | Performed by: NURSE PRACTITIONER

## 2024-04-24 PROCEDURE — 80053 COMPREHEN METABOLIC PANEL: CPT

## 2024-04-24 PROCEDURE — 85025 COMPLETE CBC W/AUTO DIFF WBC: CPT

## 2024-04-24 PROCEDURE — 3077F SYST BP >= 140 MM HG: CPT | Performed by: NURSE PRACTITIONER

## 2024-04-24 PROCEDURE — G8400 PT W/DXA NO RESULTS DOC: HCPCS | Performed by: NURSE PRACTITIONER

## 2024-04-24 PROCEDURE — G8427 DOCREV CUR MEDS BY ELIG CLIN: HCPCS | Performed by: NURSE PRACTITIONER

## 2024-04-24 PROCEDURE — G8417 CALC BMI ABV UP PARAM F/U: HCPCS | Performed by: NURSE PRACTITIONER

## 2024-04-24 PROCEDURE — 1123F ACP DISCUSS/DSCN MKR DOCD: CPT | Performed by: NURSE PRACTITIONER

## 2024-04-24 PROCEDURE — 1090F PRES/ABSN URINE INCON ASSESS: CPT | Performed by: NURSE PRACTITIONER

## 2024-04-24 PROCEDURE — 99214 OFFICE O/P EST MOD 30 MIN: CPT | Performed by: NURSE PRACTITIONER

## 2024-04-24 PROCEDURE — 86301 IMMUNOASSAY TUMOR CA 19-9: CPT

## 2024-04-24 PROCEDURE — 1036F TOBACCO NON-USER: CPT | Performed by: NURSE PRACTITIONER

## 2024-04-24 PROCEDURE — 36415 COLL VENOUS BLD VENIPUNCTURE: CPT

## 2024-04-24 RX ORDER — MONTELUKAST SODIUM 4 MG/1
2 TABLET, CHEWABLE ORAL 2 TIMES DAILY
COMMUNITY

## 2024-04-24 RX ORDER — FLECAINIDE ACETATE 100 MG/1
100 TABLET ORAL 2 TIMES DAILY
COMMUNITY
Start: 2024-02-21

## 2024-04-24 RX ORDER — AMLODIPINE BESYLATE 10 MG/1
TABLET ORAL
COMMUNITY
Start: 2024-03-05

## 2024-04-24 RX ORDER — INSULIN ASPART 100 [IU]/ML
INJECTION, SOLUTION INTRAVENOUS; SUBCUTANEOUS
COMMUNITY
Start: 2024-03-25

## 2024-04-24 RX ORDER — MIRTAZAPINE 15 MG/1
15 TABLET, FILM COATED ORAL NIGHTLY
COMMUNITY
Start: 2024-03-06

## 2024-04-24 RX ORDER — GLIPIZIDE 10 MG/1
TABLET ORAL
COMMUNITY
Start: 2024-03-05

## 2024-04-24 ASSESSMENT — PATIENT HEALTH QUESTIONNAIRE - PHQ9
SUM OF ALL RESPONSES TO PHQ QUESTIONS 1-9: 0
1. LITTLE INTEREST OR PLEASURE IN DOING THINGS: NOT AT ALL
SUM OF ALL RESPONSES TO PHQ QUESTIONS 1-9: 0
SUM OF ALL RESPONSES TO PHQ9 QUESTIONS 1 & 2: 0
SUM OF ALL RESPONSES TO PHQ QUESTIONS 1-9: 0
SUM OF ALL RESPONSES TO PHQ QUESTIONS 1-9: 0
2. FEELING DOWN, DEPRESSED OR HOPELESS: NOT AT ALL

## 2024-04-24 NOTE — PROGRESS NOTES
No current facility-administered medications for this visit.       OBJECTIVE:  BP (!) 159/67   Pulse 60   Temp 98.5 °F (36.9 °C) (Oral)   Resp 20   Ht 1.549 m (5' 1\")   Wt 64.9 kg (143 lb)   SpO2 96%   BMI 27.02 kg/m²   Pain Score:   0 - No pain (fatigue-3)     Physical Exam:  Constitutional: Well developed, well nourished female in no acute distress, sitting comfortably in the exam room chair.    HEENT: Normocephalic and atraumatic. Sclerae anicteric. Neck supple without JVD. No thyromegaly present.    Skin Warm and dry.  No bruising and no rash noted.  No erythema.  No pallor.    Respiratory Lungs are clear to auscultation bilaterally without wheezes, rales or rhonchi, normal air exchange without accessory muscle use.    CVS Normal rate, regular rhythm and normal S1 and S2.  No murmurs, gallops, or rubs.   Abdomen Soft, nontender and nondistended, normoactive bowel sounds.     Neuro Grossly nonfocal with no obvious sensory or motor deficits.   MSK Normal range of motion in general.  No edema and no tenderness.   Psych Appropriate mood and affect.      Labs:  Recent Results (from the past 96 hour(s))   Cancer Antigen 19-9    Collection Time: 04/24/24  9:41 AM   Result Value Ref Range    CA 19-9 26.50 0.00 - 35.00 U/mL   Comprehensive Metabolic Panel    Collection Time: 04/24/24  9:41 AM   Result Value Ref Range    Sodium 137 136 - 145 mmol/L    Potassium 4.6 3.5 - 5.1 mmol/L    Chloride 100 98 - 107 mmol/L    CO2 25 20 - 28 mmol/L    Anion Gap 12 9 - 18 mmol/L    Glucose 371 (H) 70 - 99 mg/dL    BUN 24 (H) 8 - 23 MG/DL    Creatinine 1.40 (H) 0.60 - 1.10 MG/DL    Est, Glom Filt Rate 39 (L) >60 ml/min/1.73m2    Calcium 9.3 8.8 - 10.2 MG/DL    Total Bilirubin 0.5 0.0 - 1.2 MG/DL    ALT 6 (L) 12 - 65 U/L    AST 25 15 - 37 U/L    Alk Phosphatase 109 (H) 35 - 104 U/L    Total Protein 7.7 6.3 - 8.2 g/dL    Albumin 3.3 3.2 - 4.6 g/dL    Globulin 4.4 (H) 2.3 - 3.5 g/dL    Albumin/Globulin Ratio 0.8 (L) 1.0 - 1.9

## 2024-07-08 ENCOUNTER — HOSPITAL ENCOUNTER (OUTPATIENT)
Dept: CT IMAGING | Age: 79
Discharge: HOME | End: 2024-07-11
Attending: INTERNAL MEDICINE
Payer: MEDICARE

## 2024-07-08 DIAGNOSIS — C25.1 MALIGNANT NEOPLASM OF BODY OF PANCREAS (HCC): ICD-10-CM

## 2024-07-08 LAB — CREAT BLD-MCNC: 1.41 MG/DL (ref 0.8–1.5)

## 2024-07-08 PROCEDURE — 6360000004 HC RX CONTRAST MEDICATION: Performed by: INTERNAL MEDICINE

## 2024-07-08 PROCEDURE — 74177 CT ABD & PELVIS W/CONTRAST: CPT

## 2024-07-08 PROCEDURE — 82565 ASSAY OF CREATININE: CPT

## 2024-07-08 RX ADMIN — DIATRIZOATE MEGLUMINE AND DIATRIZOATE SODIUM 15 ML: 660; 100 LIQUID ORAL; RECTAL at 11:01

## 2024-07-08 RX ADMIN — IOPAMIDOL 100 ML: 755 INJECTION, SOLUTION INTRAVENOUS at 11:01

## 2024-07-15 ENCOUNTER — OFFICE VISIT (OUTPATIENT)
Dept: ONCOLOGY | Age: 79
End: 2024-07-15
Payer: MEDICARE

## 2024-07-15 ENCOUNTER — HOSPITAL ENCOUNTER (OUTPATIENT)
Dept: LAB | Age: 79
Discharge: HOME | End: 2024-07-18
Payer: MEDICARE

## 2024-07-15 VITALS
BODY MASS INDEX: 27.15 KG/M2 | HEIGHT: 61 IN | TEMPERATURE: 97.9 F | OXYGEN SATURATION: 97 % | DIASTOLIC BLOOD PRESSURE: 55 MMHG | HEART RATE: 52 BPM | WEIGHT: 143.8 LBS | SYSTOLIC BLOOD PRESSURE: 145 MMHG | RESPIRATION RATE: 12 BRPM

## 2024-07-15 DIAGNOSIS — C25.1 MALIGNANT NEOPLASM OF BODY OF PANCREAS (HCC): ICD-10-CM

## 2024-07-15 DIAGNOSIS — C25.1 MALIGNANT NEOPLASM OF BODY OF PANCREAS (HCC): Primary | ICD-10-CM

## 2024-07-15 DIAGNOSIS — D72.828 OTHER ELEVATED WHITE BLOOD CELL (WBC) COUNT: ICD-10-CM

## 2024-07-15 DIAGNOSIS — Z90.81 HISTORY OF SPLENECTOMY: ICD-10-CM

## 2024-07-15 LAB
ALBUMIN SERPL-MCNC: 3.2 G/DL (ref 3.2–4.6)
ALBUMIN/GLOB SERPL: 0.8 (ref 1–1.9)
ALP SERPL-CCNC: 108 U/L (ref 35–104)
ALT SERPL-CCNC: 14 U/L (ref 12–65)
ANION GAP SERPL CALC-SCNC: 11 MMOL/L (ref 9–18)
AST SERPL-CCNC: 25 U/L (ref 15–37)
BASOPHILS # BLD: 0.1 K/UL (ref 0–0.2)
BASOPHILS NFR BLD: 1 % (ref 0–2)
BILIRUB SERPL-MCNC: 0.4 MG/DL (ref 0–1.2)
BUN SERPL-MCNC: 24 MG/DL (ref 8–23)
CALCIUM SERPL-MCNC: 9.4 MG/DL (ref 8.8–10.2)
CANCER AG19-9 SERPL-ACNC: 29.2 U/ML (ref 0–35)
CHLORIDE SERPL-SCNC: 100 MMOL/L (ref 98–107)
CO2 SERPL-SCNC: 27 MMOL/L (ref 20–28)
CREAT SERPL-MCNC: 1.59 MG/DL (ref 0.6–1.1)
DIFFERENTIAL METHOD BLD: ABNORMAL
EOSINOPHIL # BLD: 0.4 K/UL (ref 0–0.8)
EOSINOPHIL NFR BLD: 3 % (ref 0.5–7.8)
ERYTHROCYTE [DISTWIDTH] IN BLOOD BY AUTOMATED COUNT: 14.6 % (ref 11.9–14.6)
GLOBULIN SER CALC-MCNC: 3.9 G/DL (ref 2.3–3.5)
GLUCOSE SERPL-MCNC: 300 MG/DL (ref 70–99)
HCT VFR BLD AUTO: 40.2 % (ref 35.8–46.3)
HGB BLD-MCNC: 12.9 G/DL (ref 11.7–15.4)
IMM GRANULOCYTES # BLD AUTO: 0 K/UL (ref 0–0.5)
IMM GRANULOCYTES NFR BLD AUTO: 0 % (ref 0–5)
LYMPHOCYTES # BLD: 5.8 K/UL (ref 0.5–4.6)
LYMPHOCYTES NFR BLD: 45 % (ref 13–44)
MCH RBC QN AUTO: 28.6 PG (ref 26.1–32.9)
MCHC RBC AUTO-ENTMCNC: 32.1 G/DL (ref 31.4–35)
MCV RBC AUTO: 89.1 FL (ref 82–102)
MONOCYTES # BLD: 1.1 K/UL (ref 0.1–1.3)
MONOCYTES NFR BLD: 9 % (ref 4–12)
NEUTS SEG # BLD: 5.3 K/UL (ref 1.7–8.2)
NEUTS SEG NFR BLD: 42 % (ref 43–78)
NRBC # BLD: 0 K/UL (ref 0–0.2)
PLATELET # BLD AUTO: 296 K/UL (ref 150–450)
PLATELET COMMENT: ADEQUATE
PMV BLD AUTO: 11.5 FL (ref 9.4–12.3)
POTASSIUM SERPL-SCNC: 4.5 MMOL/L (ref 3.5–5.1)
PROT SERPL-MCNC: 7.1 G/DL (ref 6.3–8.2)
RBC # BLD AUTO: 4.51 M/UL (ref 4.05–5.2)
RBC MORPH BLD: ABNORMAL
SODIUM SERPL-SCNC: 138 MMOL/L (ref 136–145)
WBC # BLD AUTO: 12.7 K/UL (ref 4.3–11.1)
WBC MORPH BLD: ABNORMAL

## 2024-07-15 PROCEDURE — 36415 COLL VENOUS BLD VENIPUNCTURE: CPT

## 2024-07-15 PROCEDURE — G8400 PT W/DXA NO RESULTS DOC: HCPCS | Performed by: INTERNAL MEDICINE

## 2024-07-15 PROCEDURE — 85025 COMPLETE CBC W/AUTO DIFF WBC: CPT

## 2024-07-15 PROCEDURE — 80053 COMPREHEN METABOLIC PANEL: CPT

## 2024-07-15 PROCEDURE — 1123F ACP DISCUSS/DSCN MKR DOCD: CPT | Performed by: INTERNAL MEDICINE

## 2024-07-15 PROCEDURE — 3078F DIAST BP <80 MM HG: CPT | Performed by: INTERNAL MEDICINE

## 2024-07-15 PROCEDURE — G8428 CUR MEDS NOT DOCUMENT: HCPCS | Performed by: INTERNAL MEDICINE

## 2024-07-15 PROCEDURE — 1090F PRES/ABSN URINE INCON ASSESS: CPT | Performed by: INTERNAL MEDICINE

## 2024-07-15 PROCEDURE — 3077F SYST BP >= 140 MM HG: CPT | Performed by: INTERNAL MEDICINE

## 2024-07-15 PROCEDURE — 86301 IMMUNOASSAY TUMOR CA 19-9: CPT

## 2024-07-15 PROCEDURE — G8417 CALC BMI ABV UP PARAM F/U: HCPCS | Performed by: INTERNAL MEDICINE

## 2024-07-15 PROCEDURE — 99214 OFFICE O/P EST MOD 30 MIN: CPT | Performed by: INTERNAL MEDICINE

## 2024-07-15 PROCEDURE — 1036F TOBACCO NON-USER: CPT | Performed by: INTERNAL MEDICINE

## 2024-07-15 RX ORDER — INSULIN ASPART 100 [IU]/ML
INJECTION, SOLUTION INTRAVENOUS; SUBCUTANEOUS
COMMUNITY
Start: 2024-04-05

## 2024-07-15 ASSESSMENT — PATIENT HEALTH QUESTIONNAIRE - PHQ9
SUM OF ALL RESPONSES TO PHQ9 QUESTIONS 1 & 2: 0
SUM OF ALL RESPONSES TO PHQ QUESTIONS 1-9: 0
2. FEELING DOWN, DEPRESSED OR HOPELESS: NOT AT ALL
SUM OF ALL RESPONSES TO PHQ QUESTIONS 1-9: 0
1. LITTLE INTEREST OR PLEASURE IN DOING THINGS: NOT AT ALL

## 2024-07-15 NOTE — PATIENT INSTRUCTIONS
Patient Information from Today's Visit    The members of your [facility] are listed below:    Physician Provider: Gonzalo Figueroa, Medical Oncologist  Advanced Practice Clinician: Tamia Reyna NP  Registered Nurse: Calixto WILSON RN  Nurse Navigator: Olga Lidia PENG RN  Medical Assistant: Gerri PICHARDO CMA  :Eden PACKER  Supportive Care Services: Domingo FORD LMSW    Diagnosis: Pancreatic Cancer    Follow Up Instructions: 3 months      Treatment Summary has been discussed and given to patient:No      Current Labs:   Hospital Outpatient Visit on 07/15/2024   Component Date Value Ref Range Status    CA 19-9 07/15/2024 29.20  0.00 - 35.00 U/mL Final    Comment: [employer] ECLIA methodology  Patient's results of tumor marker testing may not be comparable to labs using different manufacturers/methods.      Sodium 07/15/2024 138  136 - 145 mmol/L Final    Potassium 07/15/2024 4.5  3.5 - 5.1 mmol/L Final    Chloride 07/15/2024 100  98 - 107 mmol/L Final    CO2 07/15/2024 27  20 - 28 mmol/L Final    Anion Gap 07/15/2024 11  9 - 18 mmol/L Final    Glucose 07/15/2024 300 (H)  70 - 99 mg/dL Final    Comment: <70 mg/dL Consistent with, but not fully diagnostic of hypoglycemia.  100 - 125 mg/dL Impaired fasting glucose/consistent with pre-diabetes mellitus.  > 126 mg/dl Fasting glucose consistent with overt diabetes mellitus      BUN 07/15/2024 24 (H)  8 - 23 MG/DL Final    Creatinine 07/15/2024 1.59 (H)  0.60 - 1.10 MG/DL Final    Est, Glom Filt Rate 07/15/2024 33 (L)  >60 ml/min/1.73m2 Final    Comment:    Pediatric calculator link: https://www.kidney.org/professionals/kdoqi/gfr_calculatorped     These results are not intended for use in patients <18 years of age.     eGFR results are calculated without a race factor using  the 2021 CKD-EPI equation. Careful clinical correlation is recommended, particularly when comparing to results calculated using previous equations.  The CKD-EPI equation is less accurate in patients with

## 2024-07-15 NOTE — PROGRESS NOTES
collection within the resection bed. No evidence of local recurrence or distant metastasis to the chest, abdomen, or pelvis.         ASSESSMENT:   Diagnosis Orders   1. Malignant neoplasm of body of pancreas (HCC)  CT CHEST ABDOMEN PELVIS W CONTRAST Additional Contrast? Radiologist Recommendation    CBC with Auto Differential    Comprehensive Metabolic Panel    Cancer Antigen 19-9    CBC with Auto Differential    Comprehensive Metabolic Panel    Cancer Antigen 19-9      2. Other elevated white blood cell (WBC) count        3. History of splenectomy                PLAN:  Lab studies were personally reviewed.    Pancreatic carcinoma: ductal adenocarcinoma arising from IPMN, 3.6 cm, R0 resection with 21 total nodes negative.  Post-op left pleural effusion, drained with no malignant cells, likely reactive.   Although she had a successful resection without dawit involvement, her risk of recurrence with just surgical excision is unacceptably high.  I recommend adjuvant chemotherapy for risk reduction.  Either mFOLFIRINOX or gemcitabine/capecitabine are acceptable, our preference has been mFOLFIRINOX for tolerance  and demonstrated efficacy vs gem alone in the PRODIGE trial.  She completed 6 months of adjuvant therapy with follow-up imaging showing no evidence of recurrent or progressive disease.      She is here for routine follow up and restaging for her resected pancreatic cancer.  Overall, she has been well since last seen.  No new symptoms.  No pain.  She continues to struggle with DM and glucose control despite being on insulin, she is establishing with endocrinology soon.Labs reviewed and stable, CA 19-9 remains normal.  CT reviewed and shows no evidence of progression, the \"mass\" in the pancreatic bed that is post-surgical seroma is stable in size.  Plan for follow up, repeat labs and OV in 3 months, CT in 6 months.  All questions were asked and answered to the best of my ability.       Please note that her WBC is

## 2024-09-24 NOTE — PROGRESS NOTES
ACUTE PHYSICAL THERAPY GOALS:  (Developed with and agreed upon by patient and/or caregiver.)  (1.) Nataliia Mazariegos will move from supine to sit and sit to supine , scoot up and down and roll side to side with MODIFIED INDEPENDENCE within 7 treatment day(s). (2.) Nataliia Mazariegos will transfer from bed to chair and chair to bed with MODIFIED INDEPENDENCE using the least restrictive device within 7 treatment day(s). (3.) Nataliia Mazariegos will ambulate with MODIFIED INDEPENDENCE for 300 feet with the least restrictive device within 7 treatment day(s). (4.) Nataliia Mazariegos will perform standing static and dynamic balance activities x 25 minutes with MODIFIED INDEPENDENCE to improve safety within 7 treatment day(s). (5.) Nataliia Mazariegos will perform therapeutic exercises x 20 min for HEP with INDEPENDENCE to improve strength, endurance, and functional mobility within 7 treatment day(s).      PHYSICAL THERAPY ASSESSMENT: Initial Assessment PT Treatment Day # 1    Nataliia Mazariegos is a 77 y.o. female   PRIMARY DIAGNOSIS: IPMN (intraductal papillary mucinous neoplasm)  IPMN (intraductal papillary mucinous neoplasm) [D49.0]  Procedure(s) (LRB):  DISTAL PANCREATECTOMY AND SPLENECTOMY LAPAROSCOPIC, CONVERTED TO OPEN; CHOLECYSTECTOMY; EXTENSIVE VASCULAR DISSECTION;  FALCIFORM LIGAMENT FLAP; INTRAOPERATIVE US (N/A)  1 Day Post-Op  Reason for Referral:   ICD-10: Treatment Diagnosis: Generalized Muscle Weakness (M62.81)  Other lack of cordination (R27.8)  Difficulty in walking, Not elsewhere classified (R26.2)  Other abnormalities of gait and mobility (R26.89)  [place]PAT[place]: [place] / [place] / Product Type: Medicare /     ASSESSMENT:     REHAB RECOMMENDATIONS:   Recommendation to date pending progress:  [address] [place] [address] (PT/OT)  Equipment:    Rolling Walker     PRIOR LEVEL OF FUNCTION:  (Prior to Hospitalization) INITIAL/CURRENT LEVEL OF FUNCTION:  (Most Recently Demonstrated)   Bed Mobility:   Independent  Sit to Stand:   Independent  Transfers:   Independent  Gait/Mobility:   Independent Bed Mobility:   Minimal Assistance  Sit to Stand:   Minimal Assistance  Transfers:   Minimal Assistance  Gait/Mobility:   Minimal Assistance     ASSESSMENT:  Ms. Shania Linares is a 77year old F who presents s/p distal pancreatectomy & splenectomy, cholecystectomy. This date pt performs mobility including bed mobility, sitting balance activities, sit <> stand transfers, and ambulation in room with Min A. Pt with unsteadiness on her feet and decreased activity tolerance, requiring 2L O2 to maintain SpO2 and constat support to ensure safety in standing. Pt presents as functioning below her baseline, with deficits in mobility including transfers, gait, balance, and activity tolerance. Pt will benefit from skilled therapy services to address stated deficits to promote return to highest level of function, independence, and safety. Will continue to follow. SUBJECTIVE:   Ms. Shania Linares states, \"I didn't expect to feel like this after surgery. \"    SOCIAL HISTORY/LIVING ENVIRONMENT: Lives with her  in a one level home with flat entry. No falls, no DME use prior.      OBJECTIVE:     PAIN: VITAL SIGNS: LINES/DRAINS:   Pre Treatment: Pain Screen  Pain Scale 1: Numeric (0 - 10)  Pain Intensity 1: 3  Pain Onset 1: post op  Pain Location 1: Abdomen  Pain Orientation 1: Anterior  Pain Description 1: Sore;Aching  Post Treatment: 4/10 Vital Signs  O2 Sat (%): 93 %  O2 Device: Nasal cannula  O2 Flow Rate (L/min): 2 l/min IV and MAX Drain  O2 Device: Nasal cannula     GROSS EVALUATION:  BLE Within Functional Limits Abnormal/ Functional Abnormal/ Non-Functional (see comments) Not Tested Comments:   AROM [x] [] [] []    PROM [x] [] [] []    Strength [] [x] [] []  generally weak BLE   Balance [] [x] [] []  unsteady on feet   Posture [] [x] [] []  forward head   Sensation [x] [] [] []  light touch BLE   Coordination [] [x] [] []  shakiness, tremors   Tone [] [] [] [x]    Edema [] [] [] [x]    Activity Tolerance [] [x] [] []  decreased, requiring O2    [] [] [] []      COGNITION/  PERCEPTION: Intact Impaired   (see comments) Comments:   Orientation [] [x]  intermittent confusion   Vision [x] []    Hearing [x] []    Command Following [x] []    Safety Awareness [x] []     [] []      MOBILITY: I Mod I S SBA CGA Min Mod Max Total  NT x2 Comments:   Bed Mobility    Rolling [] [] [] [] [] [x] [] [] [] [] []    Supine to Sit [] [] [] [] [] [x] [] [] [] [] []    Scooting [] [] [] [] [] [x] [] [] [] [] []    Sit to Supine [] [] [] [] [] [x] [] [] [] [] []    Transfers    Sit to Stand [] [] [] [] [] [x] [] [] [] [] []    Bed to Chair [] [] [] [] [] [x] [] [] [] [] []    Stand to Sit [] [] [] [] [] [x] [] [] [] [] []    I=Independent, Mod I=Modified Independent, S=Supervision, SBA=Standby Assistance, CGA=Contact Guard Assistance,   Min=Minimal Assistance, Mod=Moderate Assistance, Max=Maximal Assistance, Total=Total Assistance, NT=Not Tested  GAIT: I Mod I S SBA CGA Min Mod Max Total  NT x2 Comments:   Level of Assistance [] [] [] [] [] [x] [] [] [] [] []    Distance 15 ft    DME Rolling Walker and Gait Belt    Gait Quality Short, shuffled steps. Shaky, unsteady. Weightbearing Status N/A     I=Independent, Mod I=Modified Independent, S=Supervision, SBA=Standby Assistance, CGA=Contact Guard Assistance,   Min=Minimal Assistance, Mod=Moderate Assistance, Max=Maximal Assistance, Total=Total Assistance, NT=Not [place] [employer] [place] Form       How much difficulty does the patient currently have. .. Unable A Lot A Little None   1. Turning over in bed (including adjusting bedclothes, sheets and blankets)? [] 1   [] 2   [x] 3   [] 4   2.   Sitting down on and standing up from a chair with arms ( e.g., wheelchair, bedside commode, etc.)   [] 1   [] 2 [x] 3   [] 4   3. Moving from lying on back to sitting on the side of the bed? [] 1   [] 2   [x] 3   [] 4   How much help from another person does the patient currently need. .. Total A Lot A Little None   4. Moving to and from a bed to a chair (including a wheelchair)? [] 1   [] 2   [x] 3   [] 4   5. Need to walk in hospital room? [] 1   [] 2   [x] 3   [] 4   6. Climbing 3-5 steps with a railing? [] 1   [x] 2   [] 3   [] 4   © 2007, Trustees of [address] [place] [address] [address] [id number], under license to [employer]. All rights reserved     Score:  Initial: 17 Most Recent: X (Date: -- )    Interpretation of Tool:  Represents activities that are increasingly more difficult (i.e. Bed mobility, Transfers, Gait). PLAN:   FREQUENCY/DURATION: PT Plan of Care: 3 times/week for duration of hospital stay or until stated goals are met, whichever comes first.    PROBLEM LIST:   (Skilled intervention is medically necessary to address:)  1. Decreased Activity Tolerance  2. Decreased Balance  3. Decreased Cognition  4. Decreased Coordination  5. Decreased Gait Ability  6. Decreased Strength  7. Decreased Transfer Abilities  8. Increased Pain   INTERVENTIONS PLANNED:   (Benefits and precautions of physical therapy have been discussed with the patient.)  1. Self Care Training  2. Therapeutic Activity  3. Therapeutic Exercise/HEP  4. Neuromuscular Re-education  5. Gait Training  6. Education     TREATMENT:     EVALUATION: Moderate Complexity : (Untimed Charge)    TREATMENT:   ($$ Therapeutic Activity: 23-37 mins    )  Therapeutic Activity (24 Minutes): Therapeutic activity included Rolling, Supine to Sit, Sit to Supine, Scooting, Lateral Scooting, Transfer Training, Ambulation on level ground, Sitting balance  and Standing balance to improve functional Mobility, Strength and Activity tolerance.     TREATMENT GRID:  N/A    AFTER TREATMENT POSITION/PRECAUTIONS:  Alarm Activated, Bed, Needs within reach and RN notified    INTERDISCIPLINARY COLLABORATION:  RN/PCT and PT/PTA    TOTAL TREATMENT DURATION:  PT Patient Time In/Time Out  Time In: 1503  Time Out: [address], PT operating room

## 2024-10-15 ENCOUNTER — HOSPITAL ENCOUNTER (OUTPATIENT)
Dept: LAB | Age: 79
Discharge: HOME | End: 2024-10-15
Payer: MEDICARE

## 2024-10-15 ENCOUNTER — OFFICE VISIT (OUTPATIENT)
Dept: ONCOLOGY | Age: 79
End: 2024-10-15
Payer: MEDICARE

## 2024-10-15 VITALS
RESPIRATION RATE: 18 BRPM | OXYGEN SATURATION: 95 % | BODY MASS INDEX: 26.92 KG/M2 | WEIGHT: 142.6 LBS | HEIGHT: 61 IN | SYSTOLIC BLOOD PRESSURE: 122 MMHG | HEART RATE: 60 BPM | DIASTOLIC BLOOD PRESSURE: 63 MMHG | TEMPERATURE: 98.6 F

## 2024-10-15 DIAGNOSIS — D72.828 OTHER ELEVATED WHITE BLOOD CELL (WBC) COUNT: ICD-10-CM

## 2024-10-15 DIAGNOSIS — C25.1 MALIGNANT NEOPLASM OF BODY OF PANCREAS (HCC): ICD-10-CM

## 2024-10-15 DIAGNOSIS — C25.1 MALIGNANT NEOPLASM OF BODY OF PANCREAS (HCC): Primary | ICD-10-CM

## 2024-10-15 LAB
ALBUMIN SERPL-MCNC: 3.6 G/DL (ref 3.2–4.6)
ALBUMIN/GLOB SERPL: 0.8 (ref 1–1.9)
ALP SERPL-CCNC: 121 U/L (ref 35–104)
ALT SERPL-CCNC: 12 U/L (ref 8–45)
ANION GAP SERPL CALC-SCNC: 13 MMOL/L (ref 9–18)
AST SERPL-CCNC: 23 U/L (ref 15–37)
BASOPHILS # BLD: 0.1 K/UL (ref 0–0.2)
BASOPHILS NFR BLD: 1 % (ref 0–2)
BILIRUB SERPL-MCNC: 0.5 MG/DL (ref 0–1.2)
BUN SERPL-MCNC: 21 MG/DL (ref 8–23)
CALCIUM SERPL-MCNC: 9.8 MG/DL (ref 8.8–10.2)
CANCER AG19-9 SERPL-ACNC: 30.4 U/ML (ref 0–35)
CHLORIDE SERPL-SCNC: 101 MMOL/L (ref 98–107)
CO2 SERPL-SCNC: 29 MMOL/L (ref 20–28)
CREAT SERPL-MCNC: 1.47 MG/DL (ref 0.6–1.1)
DIFFERENTIAL METHOD BLD: ABNORMAL
EOSINOPHIL # BLD: 0.4 K/UL (ref 0–0.8)
EOSINOPHIL NFR BLD: 3 % (ref 0.5–7.8)
ERYTHROCYTE [DISTWIDTH] IN BLOOD BY AUTOMATED COUNT: 14.3 % (ref 11.9–14.6)
GLOBULIN SER CALC-MCNC: 4.5 G/DL (ref 2.3–3.5)
GLUCOSE SERPL-MCNC: 114 MG/DL (ref 70–99)
HCT VFR BLD AUTO: 44.5 % (ref 35.8–46.3)
HGB BLD-MCNC: 14.6 G/DL (ref 11.7–15.4)
IMM GRANULOCYTES # BLD AUTO: 0 K/UL (ref 0–0.5)
IMM GRANULOCYTES NFR BLD AUTO: 0 % (ref 0–5)
LYMPHOCYTES # BLD: 7.1 K/UL (ref 0.5–4.6)
LYMPHOCYTES NFR BLD: 48 % (ref 13–44)
MCH RBC QN AUTO: 28.8 PG (ref 26.1–32.9)
MCHC RBC AUTO-ENTMCNC: 32.8 G/DL (ref 31.4–35)
MCV RBC AUTO: 87.8 FL (ref 82–102)
MONOCYTES # BLD: 1 K/UL (ref 0.1–1.3)
MONOCYTES NFR BLD: 7 % (ref 4–12)
NEUTS SEG # BLD: 5.9 K/UL (ref 1.7–8.2)
NEUTS SEG NFR BLD: 41 % (ref 43–78)
NRBC # BLD: 0 K/UL (ref 0–0.2)
PLATELET # BLD AUTO: 316 K/UL (ref 150–450)
PLATELET COMMENT: ADEQUATE
PMV BLD AUTO: 10.7 FL (ref 9.4–12.3)
POTASSIUM SERPL-SCNC: 4.2 MMOL/L (ref 3.5–5.1)
PROT SERPL-MCNC: 8.1 G/DL (ref 6.3–8.2)
RBC # BLD AUTO: 5.07 M/UL (ref 4.05–5.2)
RBC MORPH BLD: ABNORMAL
SODIUM SERPL-SCNC: 143 MMOL/L (ref 136–145)
WBC # BLD AUTO: 14.5 K/UL (ref 4.3–11.1)
WBC MORPH BLD: ABNORMAL

## 2024-10-15 PROCEDURE — 1123F ACP DISCUSS/DSCN MKR DOCD: CPT | Performed by: NURSE PRACTITIONER

## 2024-10-15 PROCEDURE — 1036F TOBACCO NON-USER: CPT | Performed by: NURSE PRACTITIONER

## 2024-10-15 PROCEDURE — 3074F SYST BP LT 130 MM HG: CPT | Performed by: NURSE PRACTITIONER

## 2024-10-15 PROCEDURE — 80053 COMPREHEN METABOLIC PANEL: CPT

## 2024-10-15 PROCEDURE — G8400 PT W/DXA NO RESULTS DOC: HCPCS | Performed by: NURSE PRACTITIONER

## 2024-10-15 PROCEDURE — 36415 COLL VENOUS BLD VENIPUNCTURE: CPT

## 2024-10-15 PROCEDURE — 85025 COMPLETE CBC W/AUTO DIFF WBC: CPT

## 2024-10-15 PROCEDURE — G8427 DOCREV CUR MEDS BY ELIG CLIN: HCPCS | Performed by: NURSE PRACTITIONER

## 2024-10-15 PROCEDURE — G8484 FLU IMMUNIZE NO ADMIN: HCPCS | Performed by: NURSE PRACTITIONER

## 2024-10-15 PROCEDURE — 86301 IMMUNOASSAY TUMOR CA 19-9: CPT

## 2024-10-15 PROCEDURE — G8417 CALC BMI ABV UP PARAM F/U: HCPCS | Performed by: NURSE PRACTITIONER

## 2024-10-15 PROCEDURE — 3078F DIAST BP <80 MM HG: CPT | Performed by: NURSE PRACTITIONER

## 2024-10-15 PROCEDURE — 99214 OFFICE O/P EST MOD 30 MIN: CPT | Performed by: NURSE PRACTITIONER

## 2024-10-15 PROCEDURE — 1090F PRES/ABSN URINE INCON ASSESS: CPT | Performed by: NURSE PRACTITIONER

## 2024-10-15 ASSESSMENT — PATIENT HEALTH QUESTIONNAIRE - PHQ9
SUM OF ALL RESPONSES TO PHQ QUESTIONS 1-9: 0
SUM OF ALL RESPONSES TO PHQ QUESTIONS 1-9: 0
2. FEELING DOWN, DEPRESSED OR HOPELESS: NOT AT ALL
1. LITTLE INTEREST OR PLEASURE IN DOING THINGS: NOT AT ALL
SUM OF ALL RESPONSES TO PHQ QUESTIONS 1-9: 0
SUM OF ALL RESPONSES TO PHQ QUESTIONS 1-9: 0
SUM OF ALL RESPONSES TO PHQ9 QUESTIONS 1 & 2: 0

## 2024-10-15 NOTE — PROGRESS NOTES
use: Yes     Alcohol/week: 1.0 standard drink of alcohol    Drug use: Never    Sexual activity: Not on file   Other Topics Concern    Not on file   Social History Narrative    Not on file     Social Determinants of Health     Financial Resource Strain: Low Risk  (1/30/2023)    Received from [facility]    Financial Resource Strain     Difficulty Paying Living Expenses: Not hard at all     Difficulty Paying Medical Expenses: No   Food Insecurity: No Food Insecurity (1/30/2023)    Received from [facility]    Food Insecurity     Worried about Running Out of Food in the Last Year: Never true     Ran Out of Food in the Last Year: Never true   Transportation Needs: No Transportation Needs (1/30/2023)    Received from [facility]    Transportation Needs     Lack of Transportation: No   Physical Activity: Inactive (1/30/2023)    Received from [facility]    Physical Activity     Days of Exercise per Week: 0     Minutes of Exercise per Session: 0     Total Minutes of Exercise per Week: 0   Stress: No Stress Concern Present (1/30/2023)    Received from [facility]    Stress     Feeling of Stress : Not at all   Social Connections: Moderately Integrated (11/27/2023)    Received from [facility]    Social Connections     Frequency of Communication with Friends and Family: More than three times a week     Frequency of Social Gatherings with Friends and Family: Once a week   Intimate Partner Violence: Unknown (1/31/2024)    Received from [facility]    Intimate Partner Violence     Fear of Current or Ex-Partner: Not asked     Emotionally Abused: Not asked     Physically Abused: Not asked     Sexually Abused: Not asked   Housing Stability: Not At Risk (1/30/2023)    Received from [facility]    Housing Stability     Was there a time when you did not have a steady place to sleep: No     Worried

## 2025-01-06 ENCOUNTER — HOSPITAL ENCOUNTER (OUTPATIENT)
Dept: CT IMAGING | Age: 80
Discharge: HOME | End: 2025-01-09
Attending: INTERNAL MEDICINE
Payer: MEDICARE

## 2025-01-06 DIAGNOSIS — C25.1 MALIGNANT NEOPLASM OF BODY OF PANCREAS (HCC): ICD-10-CM

## 2025-01-06 LAB — CREAT BLD-MCNC: 1.31 MG/DL (ref 0.8–1.5)

## 2025-01-06 PROCEDURE — 6360000004 HC RX CONTRAST MEDICATION: Performed by: INTERNAL MEDICINE

## 2025-01-06 PROCEDURE — 82565 ASSAY OF CREATININE: CPT

## 2025-01-06 PROCEDURE — 74177 CT ABD & PELVIS W/CONTRAST: CPT

## 2025-01-06 RX ORDER — IOPAMIDOL 755 MG/ML
100 INJECTION, SOLUTION INTRAVASCULAR
Status: COMPLETED | OUTPATIENT
Start: 2025-01-06 | End: 2025-01-06

## 2025-01-06 RX ORDER — DIATRIZOATE MEGLUMINE AND DIATRIZOATE SODIUM 660; 100 MG/ML; MG/ML
15 SOLUTION ORAL; RECTAL
Status: DISCONTINUED | OUTPATIENT
Start: 2025-01-06 | End: 2025-01-10 | Stop reason: HOSPADM

## 2025-01-06 RX ADMIN — IOPAMIDOL 100 ML: 755 INJECTION, SOLUTION INTRAVENOUS at 11:26

## 2025-01-06 RX ADMIN — DIATRIZOATE MEGLUMINE AND DIATRIZOATE SODIUM 15 ML: 660; 100 LIQUID ORAL; RECTAL at 11:26

## 2025-01-13 DIAGNOSIS — C25.1 MALIGNANT NEOPLASM OF BODY OF PANCREAS (HCC): ICD-10-CM

## 2025-01-13 LAB
ALBUMIN SERPL-MCNC: 3.4 G/DL (ref 3.2–4.6)
ALBUMIN/GLOB SERPL: 0.8 (ref 1–1.9)
ALP SERPL-CCNC: 105 U/L (ref 35–104)
ALT SERPL-CCNC: 14 U/L (ref 8–45)
ANION GAP SERPL CALC-SCNC: 11 MMOL/L (ref 7–16)
AST SERPL-CCNC: 26 U/L (ref 15–37)
BASOPHILS # BLD: 0.18 K/UL (ref 0–0.2)
BASOPHILS NFR BLD: 1 % (ref 0–2)
BILIRUB SERPL-MCNC: 0.4 MG/DL (ref 0–1.2)
BUN SERPL-MCNC: 21 MG/DL (ref 8–23)
CALCIUM SERPL-MCNC: 9.7 MG/DL (ref 8.8–10.2)
CHLORIDE SERPL-SCNC: 105 MMOL/L (ref 98–107)
CO2 SERPL-SCNC: 25 MMOL/L (ref 20–29)
CREAT SERPL-MCNC: 1.46 MG/DL (ref 0.6–1.1)
DIFFERENTIAL METHOD BLD: ABNORMAL
EOSINOPHIL # BLD: 0.55 K/UL (ref 0–0.8)
EOSINOPHIL NFR BLD: 3 % (ref 0.5–7.8)
ERYTHROCYTE [DISTWIDTH] IN BLOOD BY AUTOMATED COUNT: 15.2 % (ref 11.9–14.6)
GLOBULIN SER CALC-MCNC: 4.4 G/DL (ref 2.3–3.5)
GLUCOSE SERPL-MCNC: 81 MG/DL (ref 70–99)
HCT VFR BLD AUTO: 42.2 % (ref 35.8–46.3)
HGB BLD-MCNC: 13.4 G/DL (ref 11.7–15.4)
IMM GRANULOCYTES # BLD AUTO: 0.07 K/UL (ref 0–0.5)
IMM GRANULOCYTES NFR BLD AUTO: 0.4 % (ref 0–5)
LYMPHOCYTES # BLD: 9.94 K/UL (ref 0.5–4.6)
LYMPHOCYTES NFR BLD: 54.3 % (ref 13–44)
MCH RBC QN AUTO: 28.6 PG (ref 26.1–32.9)
MCHC RBC AUTO-ENTMCNC: 31.8 G/DL (ref 31.4–35)
MCV RBC AUTO: 90 FL (ref 82–102)
MONOCYTES # BLD: 1.41 K/UL (ref 0.1–1.3)
MONOCYTES NFR BLD: 7.7 % (ref 4–12)
NEUTS SEG # BLD: 6.15 K/UL (ref 1.7–8.2)
NEUTS SEG NFR BLD: 33.6 % (ref 43–78)
NRBC # BLD: 0 K/UL (ref 0–0.2)
PLATELET # BLD AUTO: 324 K/UL (ref 150–450)
PLATELET COMMENT: ADEQUATE
PMV BLD AUTO: 11.7 FL (ref 9.4–12.3)
POTASSIUM SERPL-SCNC: 4.8 MMOL/L (ref 3.5–5.1)
PROT SERPL-MCNC: 7.8 G/DL (ref 6.3–8.2)
RBC # BLD AUTO: 4.69 M/UL (ref 4.05–5.2)
RBC MORPH BLD: ABNORMAL
SODIUM SERPL-SCNC: 141 MMOL/L (ref 136–145)
WBC # BLD AUTO: 18.3 K/UL (ref 4.3–11.1)
WBC MORPH BLD: ABNORMAL

## 2025-01-14 LAB — CANCER AG19-9 SERPL-ACNC: 30.7 U/ML (ref 0–35)

## 2025-01-15 ENCOUNTER — OFFICE VISIT (OUTPATIENT)
Dept: ONCOLOGY | Age: 80
End: 2025-01-15
Payer: MEDICARE

## 2025-01-15 VITALS
OXYGEN SATURATION: 94 % | RESPIRATION RATE: 16 BRPM | SYSTOLIC BLOOD PRESSURE: 137 MMHG | WEIGHT: 147.7 LBS | TEMPERATURE: 98.3 F | DIASTOLIC BLOOD PRESSURE: 59 MMHG | HEART RATE: 61 BPM | HEIGHT: 61 IN | BODY MASS INDEX: 27.89 KG/M2

## 2025-01-15 DIAGNOSIS — D72.828 OTHER ELEVATED WHITE BLOOD CELL (WBC) COUNT: ICD-10-CM

## 2025-01-15 DIAGNOSIS — C25.1 MALIGNANT NEOPLASM OF BODY OF PANCREAS (HCC): Primary | ICD-10-CM

## 2025-01-15 PROCEDURE — 1036F TOBACCO NON-USER: CPT | Performed by: INTERNAL MEDICINE

## 2025-01-15 PROCEDURE — 3075F SYST BP GE 130 - 139MM HG: CPT | Performed by: INTERNAL MEDICINE

## 2025-01-15 PROCEDURE — G8400 PT W/DXA NO RESULTS DOC: HCPCS | Performed by: INTERNAL MEDICINE

## 2025-01-15 PROCEDURE — 99214 OFFICE O/P EST MOD 30 MIN: CPT | Performed by: INTERNAL MEDICINE

## 2025-01-15 PROCEDURE — 1123F ACP DISCUSS/DSCN MKR DOCD: CPT | Performed by: INTERNAL MEDICINE

## 2025-01-15 PROCEDURE — 1090F PRES/ABSN URINE INCON ASSESS: CPT | Performed by: INTERNAL MEDICINE

## 2025-01-15 PROCEDURE — 3078F DIAST BP <80 MM HG: CPT | Performed by: INTERNAL MEDICINE

## 2025-01-15 PROCEDURE — G8417 CALC BMI ABV UP PARAM F/U: HCPCS | Performed by: INTERNAL MEDICINE

## 2025-01-15 PROCEDURE — G8428 CUR MEDS NOT DOCUMENT: HCPCS | Performed by: INTERNAL MEDICINE

## 2025-01-15 ASSESSMENT — PATIENT HEALTH QUESTIONNAIRE - PHQ9
SUM OF ALL RESPONSES TO PHQ9 QUESTIONS 1 & 2: 2
2. FEELING DOWN, DEPRESSED OR HOPELESS: MORE THAN HALF THE DAYS
1. LITTLE INTEREST OR PLEASURE IN DOING THINGS: NOT AT ALL
SUM OF ALL RESPONSES TO PHQ QUESTIONS 1-9: 2

## 2025-01-15 NOTE — PATIENT INSTRUCTIONS
equations.  The CKD-EPI equation is less accurate in patients with extremes of muscle mass, extra-renal metabolism of creatinine, excessive creatine ingestion, or following therapy that affects renal tubular secretion.      Calcium 01/13/2025 9.7  8.8 - 10.2 MG/DL Final    Total Bilirubin 01/13/2025 0.4  0.0 - 1.2 MG/DL Final    ALT 01/13/2025 14  8 - 45 U/L Final    AST 01/13/2025 26  15 - 37 U/L Final    Alk Phosphatase 01/13/2025 105 (H)  35 - 104 U/L Final    Total Protein 01/13/2025 7.8  6.3 - 8.2 g/dL Final    Albumin 01/13/2025 3.4  3.2 - 4.6 g/dL Final    Globulin 01/13/2025 4.4 (H)  2.3 - 3.5 g/dL Final    Albumin/Globulin Ratio 01/13/2025 0.8 (L)  1.0 - 1.9   Final    WBC 01/13/2025 18.3 (H)  4.3 - 11.1 K/uL Final    RBC 01/13/2025 4.69  4.05 - 5.2 M/uL Final    Hemoglobin 01/13/2025 13.4  11.7 - 15.4 g/dL Final    Hematocrit 01/13/2025 42.2  35.8 - 46.3 % Final    MCV 01/13/2025 90.0  82 - 102 FL Final    MCH 01/13/2025 28.6  26.1 - 32.9 PG Final    MCHC 01/13/2025 31.8  31.4 - 35.0 g/dL Final    RDW 01/13/2025 15.2 (H)  11.9 - 14.6 % Final    Platelets 01/13/2025 324  150 - 450 K/uL Final    MPV 01/13/2025 11.7  9.4 - 12.3 FL Final    nRBC 01/13/2025 0.00  0.0 - 0.2 K/uL Final    **Note: Absolute NRBC parameter is now reported with Hemogram**    Neutrophils % 01/13/2025 33.6 (L)  43.0 - 78.0 % Final    Lymphocytes % 01/13/2025 54.3 (H)  13.0 - 44.0 % Final    Monocytes % 01/13/2025 7.7  4.0 - 12.0 % Final    Eosinophils % 01/13/2025 3.0  0.5 - 7.8 % Final    Basophils % 01/13/2025 1.0  0.0 - 2.0 % Final    Immature Granulocytes % 01/13/2025 0.4  0.0 - 5.0 % Final    Neutrophils Absolute 01/13/2025 6.15  1.70 - 8.20 K/UL Final    Lymphocytes Absolute 01/13/2025 9.94 (H)  0.50 - 4.60 K/UL Final    Monocytes Absolute 01/13/2025 1.41 (H)  0.10 - 1.30 K/UL Final    Eosinophils Absolute 01/13/2025 0.55  0.00 - 0.80 K/UL Final    Basophils Absolute 01/13/2025 0.18  0.00 - 0.20 K/UL Final    Immature

## 2025-01-15 NOTE — PROGRESS NOTES
degenerative changes throughout the thoracolumbar spine with facet joint arthropathy, worse at L4-5 and L5-S1.     1.  Stable exam. No evidence of disease recurrence or metastatic disease in the chest, abdomen or pelvis. 2.  Redemonstrated distal pancreatectomy with stable hypoenhancing lesion abutting the resection site measuring 3.5 cm. 3.  Other chronic and nonemergent findings as above. Electronically signed by Wilda Candelaria          CT CHEST ABDOMEN PELVIS W CONTRAST Additional Contrast? Radiologist Recommendation    Result Date: 7/11/2024  CT of the Chest, Abdomen, and Pelvis INDICATION: Follow-up of pancreatic cancer. TECHNIQUE: Multiple 2D axial images were obtained through the chest, abdomen, and pelvis.  Oral contrast was used for bowel opacification.  100mL of Isovue 370 intravenous contrast was used for better evaluation of solid organs and vascular structures.  Radiation dose reduction techniques were used for this study.  All CT scans performed at this facility use one or all of the following: Automated exposure control, adjustment of the mA and/or kVp according to patient's size, iterative reconstruction. COMPARISON: CT chest abdomen pelvis from 1/3/2024 through 1/19/2022. FINDINGS: - LUNGS: No infiltrates, masses, or effusions. Chronic subpleural reticulation is noted. - MEDIASTINUM/AXILLA: No significant adenopathy. - HEART/VESSELS: Normal. - CHEST WALL: Normal. - LIVER: Normal in size and appearance.  - GALLBLADDER/BILE DUCTS: No gallstones or bile duct dilatation. - PANCREAS: The 3.5 x 3.5 x 2 cm intermediate density (46 HU) mid pancreatic body mass is stable in size. The patient is status post distal pancreatectomy. - SPLEEN: Normal. - ADRENALS:  Normal. - KIDNEYS/URETERS: No hydronephrosis or significant mass. - BLADDER: Normal. - REPRODUCTIVE ORGANS: No pelvic masses. The uterus and both adnexa are unremarkable. - BOWEL: Normal caliber.  No inflammatory changes. - LYMPH NODES: No significant

## 2025-06-02 ENCOUNTER — TELEPHONE (OUTPATIENT)
Dept: ONCOLOGY | Age: 80
End: 2025-06-02

## 2025-06-02 NOTE — TELEPHONE ENCOUNTER
Patient will have labs drawn either 6/4/25 or 6/5/25. Labs orders (, flow, CBC, CMP) extended to 6/4/25.

## 2025-06-02 NOTE — TELEPHONE ENCOUNTER
Physician provider: Dr. Figueroa  Reason for today's call (Please detail here patients chief complaint): Pt [place] [facility] [place] prior to 6/10 OV. Please confirm lab orders are submitted.  Last office visit:n/a  Patient Callback Number: [phone]   Was callback number verified?: No  Preferred pharmacy (If refill request): n/a  Veriified that patient confirmed no refills left at pharmacy? :No  Calls to office within the last 48 hours?:No    Warm Transfer to (For Red Words): n/a    Patient notified that their information will be routed to the [facility] clinical triage team for review. Patient is advised that they will receive a phone call from the triage department. If symptom related and symptoms worsen before receiving a call back, the patient has been advised to proceed to the nearest ED.

## 2025-06-04 DIAGNOSIS — C25.1 MALIGNANT NEOPLASM OF BODY OF PANCREAS (HCC): ICD-10-CM

## 2025-06-04 DIAGNOSIS — D72.828 OTHER ELEVATED WHITE BLOOD CELL (WBC) COUNT: ICD-10-CM

## 2025-06-04 LAB
ALBUMIN SERPL-MCNC: 3.1 G/DL (ref 3.2–4.6)
ALBUMIN/GLOB SERPL: 0.7 (ref 1–1.9)
ALP SERPL-CCNC: 134 U/L (ref 35–104)
ALT SERPL-CCNC: 13 U/L (ref 8–45)
ANION GAP SERPL CALC-SCNC: 12 MMOL/L (ref 7–16)
AST SERPL-CCNC: 21 U/L (ref 15–37)
BASOPHILS # BLD: 0.12 K/UL (ref 0–0.2)
BASOPHILS NFR BLD: 0.7 % (ref 0–2)
BILIRUB SERPL-MCNC: 0.4 MG/DL (ref 0–1.2)
BUN SERPL-MCNC: 22 MG/DL (ref 8–23)
CALCIUM SERPL-MCNC: 9.1 MG/DL (ref 8.8–10.2)
CHLORIDE SERPL-SCNC: 103 MMOL/L (ref 98–107)
CO2 SERPL-SCNC: 26 MMOL/L (ref 20–29)
CREAT SERPL-MCNC: 1.57 MG/DL (ref 0.6–1.1)
DIFFERENTIAL METHOD BLD: ABNORMAL
EOSINOPHIL # BLD: 0.58 K/UL (ref 0–0.8)
EOSINOPHIL NFR BLD: 3.5 % (ref 0.5–7.8)
ERYTHROCYTE [DISTWIDTH] IN BLOOD BY AUTOMATED COUNT: 14.9 % (ref 11.9–14.6)
GLOBULIN SER CALC-MCNC: 4.1 G/DL (ref 2.3–3.5)
GLUCOSE SERPL-MCNC: 172 MG/DL (ref 70–99)
HCT VFR BLD AUTO: 40.5 % (ref 35.8–46.3)
HGB BLD-MCNC: 12.7 G/DL (ref 11.7–15.4)
IMM GRANULOCYTES # BLD AUTO: 0.07 K/UL (ref 0–0.5)
IMM GRANULOCYTES NFR BLD AUTO: 0.4 % (ref 0–5)
LYMPHOCYTES # BLD: 6.4 K/UL (ref 0.5–4.6)
LYMPHOCYTES NFR BLD: 38.8 % (ref 13–44)
MCH RBC QN AUTO: 29.2 PG (ref 26.1–32.9)
MCHC RBC AUTO-ENTMCNC: 31.4 G/DL (ref 31.4–35)
MCV RBC AUTO: 93.1 FL (ref 82–102)
MONOCYTES # BLD: 1.68 K/UL (ref 0.1–1.3)
MONOCYTES NFR BLD: 10.2 % (ref 4–12)
NEUTS SEG # BLD: 7.66 K/UL (ref 1.7–8.2)
NEUTS SEG NFR BLD: 46.4 % (ref 43–78)
NRBC # BLD: 0 K/UL (ref 0–0.2)
PLATELET # BLD AUTO: 328 K/UL (ref 150–450)
PLATELET COMMENT: ADEQUATE
PMV BLD AUTO: 11.7 FL (ref 9.4–12.3)
POTASSIUM SERPL-SCNC: 4.6 MMOL/L (ref 3.5–5.1)
PROT SERPL-MCNC: 7.2 G/DL (ref 6.3–8.2)
RBC # BLD AUTO: 4.35 M/UL (ref 4.05–5.2)
RBC MORPH BLD: ABNORMAL
SODIUM SERPL-SCNC: 141 MMOL/L (ref 136–145)
WBC # BLD AUTO: 16.5 K/UL (ref 4.3–11.1)
WBC MORPH BLD: ABNORMAL

## 2025-06-05 LAB — CANCER AG19-9 SERPL-ACNC: 20.6 U/ML (ref 0–35)

## 2025-06-10 ENCOUNTER — OFFICE VISIT (OUTPATIENT)
Dept: ONCOLOGY | Age: 80
End: 2025-06-10
Payer: MEDICARE

## 2025-06-10 VITALS
RESPIRATION RATE: 16 BRPM | TEMPERATURE: 98.5 F | HEIGHT: 61 IN | BODY MASS INDEX: 29.19 KG/M2 | DIASTOLIC BLOOD PRESSURE: 72 MMHG | OXYGEN SATURATION: 94 % | SYSTOLIC BLOOD PRESSURE: 134 MMHG | WEIGHT: 154.6 LBS | HEART RATE: 61 BPM

## 2025-06-10 DIAGNOSIS — C25.1 MALIGNANT NEOPLASM OF BODY OF PANCREAS (HCC): Primary | ICD-10-CM

## 2025-06-10 PROCEDURE — G8400 PT W/DXA NO RESULTS DOC: HCPCS | Performed by: NURSE PRACTITIONER

## 2025-06-10 PROCEDURE — 1090F PRES/ABSN URINE INCON ASSESS: CPT | Performed by: NURSE PRACTITIONER

## 2025-06-10 PROCEDURE — G8417 CALC BMI ABV UP PARAM F/U: HCPCS | Performed by: NURSE PRACTITIONER

## 2025-06-10 PROCEDURE — 3078F DIAST BP <80 MM HG: CPT | Performed by: NURSE PRACTITIONER

## 2025-06-10 PROCEDURE — 1159F MED LIST DOCD IN RCRD: CPT | Performed by: NURSE PRACTITIONER

## 2025-06-10 PROCEDURE — 1123F ACP DISCUSS/DSCN MKR DOCD: CPT | Performed by: NURSE PRACTITIONER

## 2025-06-10 PROCEDURE — 99214 OFFICE O/P EST MOD 30 MIN: CPT | Performed by: NURSE PRACTITIONER

## 2025-06-10 PROCEDURE — G8427 DOCREV CUR MEDS BY ELIG CLIN: HCPCS | Performed by: NURSE PRACTITIONER

## 2025-06-10 PROCEDURE — 3075F SYST BP GE 130 - 139MM HG: CPT | Performed by: NURSE PRACTITIONER

## 2025-06-10 PROCEDURE — 1125F AMNT PAIN NOTED PAIN PRSNT: CPT | Performed by: NURSE PRACTITIONER

## 2025-06-10 PROCEDURE — 1160F RVW MEDS BY RX/DR IN RCRD: CPT | Performed by: NURSE PRACTITIONER

## 2025-06-10 PROCEDURE — 1036F TOBACCO NON-USER: CPT | Performed by: NURSE PRACTITIONER

## 2025-06-10 RX ORDER — GABAPENTIN 100 MG/1
100 CAPSULE ORAL 3 TIMES DAILY
Qty: 90 CAPSULE | Refills: 0 | Status: SHIPPED | OUTPATIENT
Start: 2025-06-10 | End: 2025-07-10

## 2025-06-10 ASSESSMENT — PATIENT HEALTH QUESTIONNAIRE - PHQ9
SUM OF ALL RESPONSES TO PHQ QUESTIONS 1-9: 0
2. FEELING DOWN, DEPRESSED OR HOPELESS: NOT AT ALL
SUM OF ALL RESPONSES TO PHQ QUESTIONS 1-9: 0
SUM OF ALL RESPONSES TO PHQ QUESTIONS 1-9: 0
1. LITTLE INTEREST OR PLEASURE IN DOING THINGS: NOT AT ALL
SUM OF ALL RESPONSES TO PHQ QUESTIONS 1-9: 0

## 2025-06-10 NOTE — PROGRESS NOTES
[facility] Hematology and Oncology: Office Visit Established Patient    Chief Complaint:    Chief Complaint   Patient presents with    Follow-up         History of Present Illness:  Ms. Kidd is a 79 y.o. female who returns today for management of resected pancreatic cancer.  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  This was followed until September 2020 at which time there was new dilatation of the main PD, suggesting main duct IPMN.   She underwent EUS with FNA which showed findings consistent with a mixed main/side branch IPMN, cytology was negative.  She was followed for another 6 months, but imaging showed ongoing dilation of the main pancreatic duct and she was recommended for  distal pancreatectomy.  This was completed on 5/5/21, also included splenectomy, cholecystectomy and extensive vascular dissection.  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  Post-operatively, she developed a large left pleural effusion, this was drained and did not show malignant cells.  She was referred to medical oncology for recommendations.  We recommended adjuvant  therapy with FOLFIRINOX for 12 cycles.  She completed 12 cycles of FOLFIRINOX in January 2022 with post-treatment assessment showing ZANDRA.        She returns today for routine follow up for resected pancreatic cancer.  Overall, she has been well since last seen.  Recently underwent an left hip replacement and is recovering well.  Fatigue is slowly improving.  There are no GI or bowel changes, remains on Colestid.  Appetite is good and weight is up.  There are no respiratory symptoms or edema.  She reports intermittent vertigo and some balance issues on feet s/p surgery. Her hands continue to feel \"on fire\" at night.  No other pain.

## 2025-06-13 ENCOUNTER — TELEPHONE (OUTPATIENT)
Dept: ONCOLOGY | Age: 80
End: 2025-06-13

## 2025-06-13 LAB
FLOW CYTOMETRY RESULTS: NORMAL
SPECIMEN SOURCE: NORMAL
TEST ORDERED: NORMAL

## 2025-06-13 NOTE — TELEPHONE ENCOUNTER
RN called patient to let her know that Dr. Figueroa reviewed her Flow Cytometry Analysis lab and he is \"not concerned and does not feel that it is cancer related\". He said it is likely to do her not having a spleen. NO further interventions needed at this time per Dr. Figueroa. Patient VU, no further questions for RN.

## 2025-06-15 ENCOUNTER — RESULTS FOLLOW-UP (OUTPATIENT)
Dept: ONCOLOGY | Age: 80
End: 2025-06-15

## 2025-07-09 ENCOUNTER — HOSPITAL ENCOUNTER (OUTPATIENT)
Dept: CT IMAGING | Age: 80
Discharge: HOME | End: 2025-07-12
Attending: INTERNAL MEDICINE
Payer: MEDICARE

## 2025-07-09 DIAGNOSIS — C25.1 MALIGNANT NEOPLASM OF BODY OF PANCREAS (HCC): ICD-10-CM

## 2025-07-09 PROCEDURE — 74177 CT ABD & PELVIS W/CONTRAST: CPT

## 2025-07-09 PROCEDURE — 6360000004 HC RX CONTRAST MEDICATION: Performed by: INTERNAL MEDICINE

## 2025-07-09 RX ORDER — IOPAMIDOL 755 MG/ML
100 INJECTION, SOLUTION INTRAVASCULAR
Status: COMPLETED | OUTPATIENT
Start: 2025-07-09 | End: 2025-07-09

## 2025-07-09 RX ADMIN — IOPAMIDOL 100 ML: 755 INJECTION, SOLUTION INTRAVENOUS at 10:08

## 2025-08-05 ENCOUNTER — OFFICE VISIT (OUTPATIENT)
Dept: ONCOLOGY | Age: 80
End: 2025-08-05
Payer: MEDICARE

## 2025-08-05 VITALS
SYSTOLIC BLOOD PRESSURE: 136 MMHG | TEMPERATURE: 98.5 F | WEIGHT: 144.7 LBS | HEIGHT: 61 IN | RESPIRATION RATE: 24 BRPM | OXYGEN SATURATION: 94 % | BODY MASS INDEX: 27.32 KG/M2 | HEART RATE: 57 BPM | DIASTOLIC BLOOD PRESSURE: 64 MMHG

## 2025-08-05 DIAGNOSIS — Z90.81 HISTORY OF SPLENECTOMY: ICD-10-CM

## 2025-08-05 DIAGNOSIS — D72.828 OTHER ELEVATED WHITE BLOOD CELL (WBC) COUNT: ICD-10-CM

## 2025-08-05 DIAGNOSIS — C25.1 MALIGNANT NEOPLASM OF BODY OF PANCREAS (HCC): Primary | ICD-10-CM

## 2025-08-05 PROCEDURE — 3078F DIAST BP <80 MM HG: CPT | Performed by: INTERNAL MEDICINE

## 2025-08-05 PROCEDURE — 1125F AMNT PAIN NOTED PAIN PRSNT: CPT | Performed by: INTERNAL MEDICINE

## 2025-08-05 PROCEDURE — G8417 CALC BMI ABV UP PARAM F/U: HCPCS | Performed by: INTERNAL MEDICINE

## 2025-08-05 PROCEDURE — G8428 CUR MEDS NOT DOCUMENT: HCPCS | Performed by: INTERNAL MEDICINE

## 2025-08-05 PROCEDURE — 3075F SYST BP GE 130 - 139MM HG: CPT | Performed by: INTERNAL MEDICINE

## 2025-08-05 PROCEDURE — 1036F TOBACCO NON-USER: CPT | Performed by: INTERNAL MEDICINE

## 2025-08-05 PROCEDURE — 1090F PRES/ABSN URINE INCON ASSESS: CPT | Performed by: INTERNAL MEDICINE

## 2025-08-05 PROCEDURE — 99214 OFFICE O/P EST MOD 30 MIN: CPT | Performed by: INTERNAL MEDICINE

## 2025-08-05 PROCEDURE — G8400 PT W/DXA NO RESULTS DOC: HCPCS | Performed by: INTERNAL MEDICINE

## 2025-08-05 PROCEDURE — 1123F ACP DISCUSS/DSCN MKR DOCD: CPT | Performed by: INTERNAL MEDICINE

## 2025-08-05 ASSESSMENT — PATIENT HEALTH QUESTIONNAIRE - PHQ9
SUM OF ALL RESPONSES TO PHQ QUESTIONS 1-9: 0
2. FEELING DOWN, DEPRESSED OR HOPELESS: NOT AT ALL
1. LITTLE INTEREST OR PLEASURE IN DOING THINGS: NOT AT ALL
SUM OF ALL RESPONSES TO PHQ QUESTIONS 1-9: 0

## 2025-09-05 RX ORDER — GABAPENTIN 100 MG/1
100 CAPSULE ORAL 3 TIMES DAILY
Qty: 90 CAPSULE | Refills: 0 | Status: SHIPPED | OUTPATIENT
Start: 2025-09-05 | End: 2025-10-05

## (undated) DEVICE — SUT PROL 4-0 30IN SH1 DA BLU --

## (undated) DEVICE — SUTURE VCRL SZ 3-0 L27IN ABSRB UD L26MM SH 1/2 CIR J416H

## (undated) DEVICE — GAUZE,SPONGE,2"X2",8PLY,STERILE,LF,2'S: Brand: MEDLINE

## (undated) DEVICE — GEL MEDC ULTRASOUND 5L -- REPLACED BY 326862

## (undated) DEVICE — NEEDLE HYPO 25GA L1.5IN BLU POLYPR HUB S STL REG BVL STR

## (undated) DEVICE — DRAIN SURG 19FR 100% SIL RADPQ RND CHN FULL FLUT

## (undated) DEVICE — GLOVE SURG SZ 6.5 L11.2IN THK8.6MIL LT BRN LTX FREE

## (undated) DEVICE — BLADE ELECTRODE: Brand: EDGE

## (undated) DEVICE — SUTURE SZ 0 27IN 5/8 CIR UR-6  TAPER PT VIOLET ABSRB VICRYL J603H

## (undated) DEVICE — MEDI-VAC NON-CONDUCTIVE SUCTION TUBING: Brand: CARDINAL HEALTH

## (undated) DEVICE — YANKAUER,BULB TIP,W/O VENT,RIGID,STERILE: Brand: MEDLINE

## (undated) DEVICE — PREP SKN CHLRAPRP APL 26ML STR --

## (undated) DEVICE — Z CONVERTED USE 2421973 CONTAINER SPEC 60/30ML 10% FRMLN POLYPR PREFIL

## (undated) DEVICE — SUTURE PROL SZ 2-0 L36IN NONABSORBABLE BLU SH L26MM 1/2 CIR 8523H

## (undated) DEVICE — SUTURE PERMAHAND SZ 2-0 L18IN NONABSORBABLE BLK L26MM PS 1588H

## (undated) DEVICE — KIT THORCENT 8FR L5IN POLYUR W/ 18/22/25GA NDL 3 W STPCOCK

## (undated) DEVICE — REM POLYHESIVE ADULT PATIENT RETURN ELECTRODE: Brand: VALLEYLAB

## (undated) DEVICE — SEALANT TISS 4 CC FIBRIN VISTASEAL

## (undated) DEVICE — SUTURE PERMAHAND SZ 2-0 L30IN NONABSORBABLE BLK SILK W/O A305H

## (undated) DEVICE — Device

## (undated) DEVICE — MASTISOL ADHESIVE LIQ 2/3ML

## (undated) DEVICE — RELOAD STPL H1-2.5XL35MM VASC THN TISS WHT B FRM NAT ARTC

## (undated) DEVICE — SUTURE VCRL SZ 4-0 L27IN ABSRB UD L19MM PS-2 3/8 CIR PRIM J426H

## (undated) DEVICE — NDL HYPO BVL NSAF 25GX1IN LF --

## (undated) DEVICE — SUTURE PDS + SZ 1 L96IN ABSRB VLT L65MM TP-1 1/2 CIR PDP880G

## (undated) DEVICE — GENERAL LAPAROSCOPY: Brand: MEDLINE INDUSTRIES, INC.

## (undated) DEVICE — SOLUTION IRRIG 1000ML H2O STRL BLT

## (undated) DEVICE — STAPLER SKIN L320MM 35MM VASC TISS 12 FIRING B FRM PWR

## (undated) DEVICE — SUTURE PERMAHAND SZ 0 L30IN NONABSORBABLE BLK SILK BRAID A306H

## (undated) DEVICE — STANDARD SURGICAL GOWN, L: Brand: CONVERTORS

## (undated) DEVICE — STAPLER SKIN LN REINF 60 MM ECHELON ENDOPATH

## (undated) DEVICE — DRAPE,T,LAPARO,TRANS,STERILE: Brand: MEDLINE

## (undated) DEVICE — STRIP,CLOSURE,WOUND,MEDI-STRIP,1/2X4: Brand: MEDLINE

## (undated) DEVICE — MARYLAND JAW LAPAROSCOPIC SEALER/DIVIDER COATED: Brand: LIGASURE

## (undated) DEVICE — SYR 5ML 1/5 GRAD LL NSAF LF --

## (undated) DEVICE — TAPE UMB 1/8X30IN MP COT WHT --

## (undated) DEVICE — TROCAR: Brand: KII FIOS FIRST ENTRY

## (undated) DEVICE — INTENDED FOR TISSUE SEPARATION, AND OTHER PROCEDURES THAT REQUIRE A SHARP SURGICAL BLADE TO PUNCTURE OR CUT.: Brand: BARD-PARKER ®  SAFETY SCALPED

## (undated) DEVICE — BASIC SINGLE BASIN-LF: Brand: MEDLINE INDUSTRIES, INC.

## (undated) DEVICE — DRAPE TWL SURG 16X26IN BLU ORB04] ALLCARE INC]

## (undated) DEVICE — 2, DISPOSABLE SUCTION/IRRIGATOR WITHOUT DISPOSABLE TIP: Brand: STRYKEFLOW

## (undated) DEVICE — BLADE ASSEMB CLP HAIR FINE --

## (undated) DEVICE — RELOAD STPL L60MM H1.5-3.6MM REG TISS BLU GRIPPING SURF B

## (undated) DEVICE — 3M™ TEGADERM™ TRANSPARENT FILM DRESSING FRAME STYLE, 1626W, 4 IN X 4-3/4 IN (10 CM X 12 CM), 50/CT 4CT/CASE: Brand: 3M™ TEGADERM™

## (undated) DEVICE — CARDINAL HEALTH FLEXIBLE LIGHT HANDLE COVER: Brand: CARDINAL HEALTH

## (undated) DEVICE — SUTURE PERMAHAND SZ 3-0 L30IN NONABSORBABLE BLK L26MM SH C017D

## (undated) DEVICE — RESERVOIR,SUCTION,100CC,SILICONE: Brand: MEDLINE

## (undated) DEVICE — STERILE POLYISOPRENE POWDER-FREE SURGICAL GLOVES: Brand: PROTEXIS

## (undated) DEVICE — TRAY CATH 16F DRN BG LTX -- CONVERT TO ITEM 363158

## (undated) DEVICE — C-ARM: Brand: UNBRANDED

## (undated) DEVICE — 3M™ IOBAN™ 2 ANTIMICROBIAL INCISE DRAPE 6650EZ: Brand: IOBAN™ 2

## (undated) DEVICE — LAPAROSCOPIC TROCAR SLEEVE/SINGLE USE: Brand: KII® OPTICAL ACCESS SYSTEM

## (undated) DEVICE — #15 SAFETY SCALPEL: Brand: DEROYAL

## (undated) DEVICE — VISUALIZATION SYSTEM: Brand: CLEARIFY

## (undated) DEVICE — GOWN,REINFORCE,POLY,SIRUS,XLNG/XLG: Brand: MEDLINE

## (undated) DEVICE — STERILE SLEEVE: Brand: CONVERTORS

## (undated) DEVICE — TROCAR: Brand: KII® SLEEVE

## (undated) DEVICE — STAPLER INT L34CM 60MM LNG ENDOSCP ARTC PWR + ECHELON FLX

## (undated) DEVICE — MINOR SPLIT GENERAL: Brand: MEDLINE INDUSTRIES, INC.

## (undated) DEVICE — ACCESS PLATFORM FOR MINIMALLY INVASIVE SURGERY.: Brand: GELPORT® LAPAROSCOPIC  SYSTEM

## (undated) DEVICE — GAUZE,SPONGE,4"X4",16PLY,STRL,LF,10/TRAY: Brand: MEDLINE

## (undated) DEVICE — SPONGE LAP 18X18IN STRL -- 5/PK

## (undated) DEVICE — Z DUPLICATE USE 2431315 SET INSUF TBNG HI FLO W/ SMK EVAC FOR PNEUMOCLEAR

## (undated) DEVICE — GARMENT,MEDLINE,DVT,INT,CALF,MED, GEN2: Brand: MEDLINE

## (undated) DEVICE — SOLUTION IRRIG 3000ML H2O STRL BAG